# Patient Record
Sex: MALE | Race: BLACK OR AFRICAN AMERICAN | NOT HISPANIC OR LATINO | Employment: OTHER | URBAN - METROPOLITAN AREA
[De-identification: names, ages, dates, MRNs, and addresses within clinical notes are randomized per-mention and may not be internally consistent; named-entity substitution may affect disease eponyms.]

---

## 2020-08-07 ENCOUNTER — OFFICE VISIT (OUTPATIENT)
Dept: INTERNAL MEDICINE | Facility: CLINIC | Age: 70
End: 2020-08-07
Payer: MEDICARE

## 2020-08-07 ENCOUNTER — LAB VISIT (OUTPATIENT)
Dept: LAB | Facility: HOSPITAL | Age: 70
End: 2020-08-07
Attending: FAMILY MEDICINE
Payer: MEDICARE

## 2020-08-07 VITALS
BODY MASS INDEX: 24.96 KG/M2 | HEART RATE: 68 BPM | WEIGHT: 184.31 LBS | OXYGEN SATURATION: 99 % | HEIGHT: 72 IN | TEMPERATURE: 98 F | RESPIRATION RATE: 18 BRPM | DIASTOLIC BLOOD PRESSURE: 86 MMHG | SYSTOLIC BLOOD PRESSURE: 148 MMHG

## 2020-08-07 DIAGNOSIS — Z12.5 SCREENING PSA (PROSTATE SPECIFIC ANTIGEN): ICD-10-CM

## 2020-08-07 DIAGNOSIS — R03.0 ELEVATED BLOOD PRESSURE READING: ICD-10-CM

## 2020-08-07 DIAGNOSIS — Z13.6 ENCOUNTER FOR LIPID SCREENING FOR CARDIOVASCULAR DISEASE: ICD-10-CM

## 2020-08-07 DIAGNOSIS — Z11.4 ENCOUNTER FOR SCREENING FOR HIV: ICD-10-CM

## 2020-08-07 DIAGNOSIS — Z11.59 NEED FOR HEPATITIS C SCREENING TEST: ICD-10-CM

## 2020-08-07 DIAGNOSIS — Z12.12 SCREENING FOR COLORECTAL CANCER: Primary | ICD-10-CM

## 2020-08-07 DIAGNOSIS — Z76.89 ENCOUNTER TO ESTABLISH CARE: ICD-10-CM

## 2020-08-07 DIAGNOSIS — Z12.11 SCREENING FOR COLORECTAL CANCER: Primary | ICD-10-CM

## 2020-08-07 DIAGNOSIS — Z13.220 ENCOUNTER FOR LIPID SCREENING FOR CARDIOVASCULAR DISEASE: ICD-10-CM

## 2020-08-07 DIAGNOSIS — F17.290 CIGAR SMOKER MOTIVATED TO QUIT: ICD-10-CM

## 2020-08-07 LAB
ALBUMIN SERPL BCP-MCNC: 4 G/DL (ref 3.5–5.2)
ALP SERPL-CCNC: 67 U/L (ref 55–135)
ALT SERPL W/O P-5'-P-CCNC: 12 U/L (ref 10–44)
ANION GAP SERPL CALC-SCNC: 8 MMOL/L (ref 8–16)
AST SERPL-CCNC: 19 U/L (ref 10–40)
BILIRUB SERPL-MCNC: 0.6 MG/DL (ref 0.1–1)
BUN SERPL-MCNC: 14 MG/DL (ref 8–23)
CALCIUM SERPL-MCNC: 9.7 MG/DL (ref 8.7–10.5)
CHLORIDE SERPL-SCNC: 108 MMOL/L (ref 95–110)
CHOLEST SERPL-MCNC: 245 MG/DL (ref 120–199)
CHOLEST/HDLC SERPL: 5.1 {RATIO} (ref 2–5)
CO2 SERPL-SCNC: 26 MMOL/L (ref 23–29)
COMPLEXED PSA SERPL-MCNC: 5.3 NG/ML (ref 0–4)
CREAT SERPL-MCNC: 1 MG/DL (ref 0.5–1.4)
EST. GFR  (AFRICAN AMERICAN): >60 ML/MIN/1.73 M^2
EST. GFR  (NON AFRICAN AMERICAN): >60 ML/MIN/1.73 M^2
GLUCOSE SERPL-MCNC: 88 MG/DL (ref 70–110)
HDLC SERPL-MCNC: 48 MG/DL (ref 40–75)
HDLC SERPL: 19.6 % (ref 20–50)
LDLC SERPL CALC-MCNC: 179.2 MG/DL (ref 63–159)
NONHDLC SERPL-MCNC: 197 MG/DL
POTASSIUM SERPL-SCNC: 4.7 MMOL/L (ref 3.5–5.1)
PROT SERPL-MCNC: 7.9 G/DL (ref 6–8.4)
SODIUM SERPL-SCNC: 142 MMOL/L (ref 136–145)
TRIGL SERPL-MCNC: 89 MG/DL (ref 30–150)

## 2020-08-07 PROCEDURE — 86703 HIV-1/HIV-2 1 RESULT ANTBDY: CPT

## 2020-08-07 PROCEDURE — 80053 COMPREHEN METABOLIC PANEL: CPT

## 2020-08-07 PROCEDURE — 86803 HEPATITIS C AB TEST: CPT

## 2020-08-07 PROCEDURE — 99203 OFFICE O/P NEW LOW 30 MIN: CPT | Mod: PBBFAC | Performed by: FAMILY MEDICINE

## 2020-08-07 PROCEDURE — 99204 OFFICE O/P NEW MOD 45 MIN: CPT | Mod: S$PBB,,, | Performed by: FAMILY MEDICINE

## 2020-08-07 PROCEDURE — 99999 PR PBB SHADOW E&M-NEW PATIENT-LVL III: ICD-10-PCS | Mod: PBBFAC,,, | Performed by: FAMILY MEDICINE

## 2020-08-07 PROCEDURE — 85025 COMPLETE CBC W/AUTO DIFF WBC: CPT

## 2020-08-07 PROCEDURE — 80061 LIPID PANEL: CPT

## 2020-08-07 PROCEDURE — 36415 COLL VENOUS BLD VENIPUNCTURE: CPT

## 2020-08-07 PROCEDURE — 99999 PR PBB SHADOW E&M-NEW PATIENT-LVL III: CPT | Mod: PBBFAC,,, | Performed by: FAMILY MEDICINE

## 2020-08-07 PROCEDURE — 84153 ASSAY OF PSA TOTAL: CPT

## 2020-08-07 PROCEDURE — 99204 PR OFFICE/OUTPT VISIT, NEW, LEVL IV, 45-59 MIN: ICD-10-PCS | Mod: S$PBB,,, | Performed by: FAMILY MEDICINE

## 2020-08-07 RX ORDER — ASCORBIC ACID 250 MG
250 TABLET ORAL DAILY
COMMUNITY

## 2020-08-07 RX ORDER — MULTIVIT WITH MINERALS/HERBS
1 TABLET ORAL DAILY
COMMUNITY

## 2020-08-07 RX ORDER — CHOLECALCIFEROL (VITAMIN D3) 25 MCG
1000 TABLET ORAL DAILY
COMMUNITY

## 2020-08-07 NOTE — PROGRESS NOTES
Primary Doctor No  Patient Care Team:  Primary Doctor No as PCP - General    Has the patient seen any provider outside of the network since the last visit ? (no). If yes, HIPPA forms completed and records requested.      Visit Type:est care    Chief Complaint:  Chief Complaint   Patient presents with    Establish Care     pt states he has been having problems with his BP       History of Present Illness:  HPI     Establish Care      Additional comments: pt states he has been having problems with his BP          Last edited by Mark Madera MA on 2020  2:45 PM. (History)      PMH none  PSH none  FMH paternal DM, heart failure,  73yo  Sister DM,  from complications of DM, at 61yo    SH 1 cigar per day, since 15 yo  Denies cigarettes or vaping    One episode of dizziness  Went to firestation to be checked and elevated 160/102  Reports dietary indiscretion    HA off an on, takes aleve   Located behind ear  Denies worst HA of life  Able to function    Taking a beta prostate  No longer experiencing weakened stream  Desires psa screening    Neg depression screening using PHQ2    How often do you have a drink containing Alcohol? occasional, social use one cocktail per night    Do you exercise  (no ) not active    Do you take a baby Aspirin daily ( no)    Do you have an advance directive ( no ) The patient was given information regarding Living Will/Durable Power-of- if requested.     The following were reviewed: Active problem list, medication list, allergies, family history, social history, and Health Maintenance.     Medications have been reviewed and reconciled with patient at visit today.    Exam:  Vitals:    20 1445   BP: (!) 148/86   Pulse: 68   Resp: 18   Temp: 97.9 °F (36.6 °C)     Weight: 83.6 kg (184 lb 4.9 oz)   Body mass index is 25 kg/m².    Review of Systems   Constitutional: Negative for fever and malaise/fatigue.   HENT: Negative for congestion and sore throat.    Eyes:  Negative for blurred vision and discharge.   Respiratory: Negative for cough and wheezing.    Cardiovascular: Negative for palpitations and leg swelling.   Gastrointestinal: Negative for nausea and vomiting.   Genitourinary: Negative for dysuria and hematuria.   Musculoskeletal: Negative for falls and joint pain.   Neurological: Positive for headaches. Negative for dizziness.   Psychiatric/Behavioral: Negative for depression. The patient is not nervous/anxious and does not have insomnia.        Physical Exam  Vitals signs reviewed.   Constitutional:       General: He is not in acute distress.     Appearance: He is well-developed.   HENT:      Head: Normocephalic and atraumatic.   Neck:      Musculoskeletal: Normal range of motion.   Cardiovascular:      Rate and Rhythm: Normal rate.   Pulmonary:      Effort: Pulmonary effort is normal. No respiratory distress.   Abdominal:      Palpations: Abdomen is soft.   Musculoskeletal: Normal range of motion.   Skin:     General: Skin is warm and dry.   Neurological:      Mental Status: He is alert and oriented to person, place, and time.   Psychiatric:         Behavior: Behavior normal.         Laboratory Reviewed: (N/A)  No results found for: WBC, HGB, HCT, PLT, CHOL, TRIG, HDL, LDLDIRECT, ALT, AST, NA, K, CL, CREATININE, BUN, CO2, TSH, PSA, INR, GLUF, HGBA1C, MICROALBUR    Assessment:  1. Screening for colorectal cancer    2. Encounter for lipid screening for cardiovascular disease    3. Encounter for screening for HIV    4. Need for hepatitis C screening test    5. Encounter to establish care    6. Elevated blood pressure reading    7. Cigar smoker motivated to quit          Plan:  Problem List Items Addressed This Visit        Cardiac/Vascular    Elevated blood pressure reading    Current Assessment & Plan     Counseled on lifestyle changes. F/u in 2 weeks. If elevated, initiate diuretic            Other    Cigar smoker motivated to quit      Other Visit Diagnoses      Screening for colorectal cancer    -  Primary    Encounter for lipid screening for cardiovascular disease        Encounter for screening for HIV        Need for hepatitis C screening test        Encounter to establish care                Follow up: No follow-ups on file.    After visit summary printed and given to patient upon discharge.

## 2020-08-07 NOTE — PATIENT INSTRUCTIONS
Step-by-Step  Checking Your Blood Pressure    Date Last Reviewed: 4/27/2016  © 7235-8312 [employer]. [address] [place] [address], [place] [address]. All rights reserved. This information is not intended as a substitute for professional medical care. Always follow your healthcare professional's instructions.        Taking a Diuretic  Your healthcare provider has prescribed a diuretic, or water pill, to help your body get rid of excess water and salt and maintain appropriate fluid balance. Taking your diuretic can help you feel better, breathe better, move more easily, and have more energy.     Take your medication early in the day at the same time each day.      The name of my diuretic is:     ________________________________________   Medicine tips  · Read the fact sheet that comes with your medicine. It tells you when and how to take it. Ask for a medicine sheet if you dont get one.  · If you take 2 or more doses each day, take the last one before dinner if you can. That way youll get up fewer times during the night to go to the bathroom. However, make sure you have enough time between doses during the day.   · If you miss a dose, take it as soon as you remember. If it is almost time for the next dose, skip the missed dose. Do not take a double dose.  · If you miss 2 or more consecutive doses, call your healthcare provider. You may be at risk for fluid buildup.  For your safety  · Follow your doctors guidelines for potassium intake. You may need to take a potassium supplement. Or, you may need to avoid potassium supplements, salt substitutes with potassium, or large amounts of high-potassium foods (such as bananas, potatoes, broccoli, and milk). Recommendations for potassium will depend on the type of diuretic you are prescribed along with your kidney function and other factors. Your healthcare provider will likely want to check your potassium level regularly while you are taking this  medicine.  · Talk to your healthcare provider about whether it is safe for you to drink alcohol while taking this medicine.  · Get up slowly when you are sitting or lying down. This helps prevent dizziness and falls due to dizziness.  · Ask your healthcare provider or pharmacist before you take any other prescription or nonprescription medicine or herbal supplements. Some of them may interact with your diuretic and keep it from working correctly.  · Limit exposure to sunlight. A diuretic may increase your sensitivity to the sun. Even brief sun exposure may cause skin rash, itching, redness, or other discoloration. It may also lead to severe sunburn. To protect your skin do the following:  ¨ Avoid direct sunlight, especially between 10 a.m. and 2 p.m., whenever possible.  ¨ Apply a daily sunblock of at least SPF 15 (or higher) to any exposed skin, including your lips.  ¨ Wear protective clothing, such as a hat and sunglasses, when you are outdoors.  ¨ Avoid sunlamps and tanning booths.  ¨ Long-sleeve shirts and pants in the summer can help protect your skin.        When to seek medical advice  Call your healthcare provider right away if any of these occur:  · Have diarrhea, constipation, nausea or vomiting.  · Lose your appetite or notice a rapid or excessive weight gain.  · Feel extremely tired or weak.  · Have shortness of breath or difficulty breathing or swallowing.  · Have numbness or tingling in your hands, feet, or lips or a ringing in your ears.  · Feel lightheaded when getting up after sitting or lying down.  · Have headaches, blurred vision, or feel a sense of confusion.  · Have muscle cramps or joint pain.  · Have chest pains or changes in your heartbeat.  · Have an excessive thirst or a dry mouth.  · Notice a skin rash.  · Gain more than 2 pounds in 1 day or 4 pounds in 1 week (ask your healthcare provider for his or her specific direction).  · Have any other unusual symptoms.   Date Last Reviewed:  6/1/2016  © 2421-7323 [employer]. [address] [place] [address], [place] [address]. All rights reserved. This information is not intended as a substitute for professional medical care. Always follow your healthcare professional's instructions.

## 2020-08-08 LAB
BASOPHILS # BLD AUTO: 0.04 K/UL (ref 0–0.2)
BASOPHILS NFR BLD: 0.8 % (ref 0–1.9)
DIFFERENTIAL METHOD: ABNORMAL
EOSINOPHIL # BLD AUTO: 0.1 K/UL (ref 0–0.5)
EOSINOPHIL NFR BLD: 2.5 % (ref 0–8)
ERYTHROCYTE [DISTWIDTH] IN BLOOD BY AUTOMATED COUNT: 14.8 % (ref 11.5–14.5)
HCT VFR BLD AUTO: 47 % (ref 40–54)
HGB BLD-MCNC: 14.7 G/DL (ref 14–18)
IMM GRANULOCYTES # BLD AUTO: 0.01 K/UL (ref 0–0.04)
IMM GRANULOCYTES NFR BLD AUTO: 0.2 % (ref 0–0.5)
LYMPHOCYTES # BLD AUTO: 2.3 K/UL (ref 1–4.8)
LYMPHOCYTES NFR BLD: 44.9 % (ref 18–48)
MCH RBC QN AUTO: 28.5 PG (ref 27–31)
MCHC RBC AUTO-ENTMCNC: 31.3 G/DL (ref 32–36)
MCV RBC AUTO: 91 FL (ref 82–98)
MONOCYTES # BLD AUTO: 0.4 K/UL (ref 0.3–1)
MONOCYTES NFR BLD: 8.6 % (ref 4–15)
NEUTROPHILS # BLD AUTO: 2.2 K/UL (ref 1.8–7.7)
NEUTROPHILS NFR BLD: 43 % (ref 38–73)
NRBC BLD-RTO: 0 /100 WBC
PLATELET # BLD AUTO: 234 K/UL (ref 150–350)
PMV BLD AUTO: 11.3 FL (ref 9.2–12.9)
RBC # BLD AUTO: 5.15 M/UL (ref 4.6–6.2)
WBC # BLD AUTO: 5.12 K/UL (ref 3.9–12.7)

## 2020-08-10 ENCOUNTER — TELEPHONE (OUTPATIENT)
Dept: ENDOSCOPY | Facility: HOSPITAL | Age: 70
End: 2020-08-10

## 2020-08-10 ENCOUNTER — TELEPHONE (OUTPATIENT)
Dept: INTERNAL MEDICINE | Facility: CLINIC | Age: 70
End: 2020-08-10

## 2020-08-10 DIAGNOSIS — Z12.11 COLON CANCER SCREENING: Primary | ICD-10-CM

## 2020-08-10 DIAGNOSIS — Z12.11 SCREEN FOR COLON CANCER: ICD-10-CM

## 2020-08-10 DIAGNOSIS — R97.20 ELEVATED PSA, LESS THAN 10 NG/ML: ICD-10-CM

## 2020-08-10 DIAGNOSIS — E78.2 HYPERLIPIDEMIA, MIXED: ICD-10-CM

## 2020-08-10 LAB
HCV AB SERPL QL IA: NEGATIVE
HIV 1+2 AB+HIV1 P24 AG SERPL QL IA: NEGATIVE

## 2020-08-10 NOTE — TELEPHONE ENCOUNTER
Location Screening:    If answers yes to any of the following, schedule at [facility] ONLY. If No, OK for either location.    1. Is there a diagnosis of heart failure, severe heart valve disease (aortic stenosis) or mechanical valve? no  a. Is the Left Ventricle Ejection Fraction <30% ? no    2. Does the pt have pulmonary hypertension? no   a. Is pulmonary arterial pressure gradient >50mmHg? no   b. Is there evidence of right ventricular dysfunction? no    3. Does the pt have achalasia? no    4. Any history of negative reaction to anesthesia? no   a. Myasthenia gravis? no   b. Malignant hyperthermia? no   c. Other? no    5. Is procedure for esophageal banding? no      COVID Screening    1. Have you had a fever in the last 7 days or have you used fever reducing medicines for a fever in the last 7 days?  no    2. Are you experiencing shortness of breath, cough, muscle aches, loss of taste or loss of smell?  no    If answered yes to questions 1 and 2, the patient must seek medical attention with their PCP.  Do not schedule their procedure.     3. Are you residing with anyone who has tested positive for Covid?  no    If answered yes to question 3, recommend 14 day self-quarantine from the date of relative's positive test and place special needs note in the depot.  Wait to schedule.     ENDO screening    1. Have you been admitted for cardiac, kidney or pulmonary causes to the hospital in the past 3 months? no   If yes, schedule an appointment with PCP before scheduling endoscopic procedure.     2. Have you had a stent placed in the last 12 months? no   If yes, for a screening visit, cancel and message the ordering provider.  The patient will need a new order when the time is appropriate.     3. Have you had a stroke or heart attack in the past 6 months? no   If yes, cancel and refer patient to ordering provider for clearance, also message ordering provider to inform.     4. Have you had any chest pain in the past 3 months?  "no   If yes, Have you been evaluated by your PCP and/or cardiologist and it was determined to not be heart related? not applicable   If No, Pt needs to be seen by PCP or Cardiologist .  Pt can be scheduled once clearance obtained by either of those providers.     5. Do you take prescription weight loss medications?  no   If yes, must stop for 2 weeks prior to procedure.     6. Have you been diagnosed with diverticulitis within the past 3 months? no   If yes, must have been seen by GI within the last 3 months, if not schedule with GI [facility].    If pt has been seen by GI, schedule procedure 8-12 weeks post antibiotic treatment.     7. Are you on Dialysis? no  If yes, schedule procedure for the day AFTER dialysis.  Appt time should be 9am or later, patient arrival time is 2 hours prior.  Nulytely or miralax prep for all patients with kidney disease.     8. Are you diabetic?  no   If yes, schedule morning appt. Advise pt to hold all diabetic meds day of procedure.     9. If pt is older than 80 years of age and HAS NOT been seen by GI or PCP within the last 6 months, needs appt with GI [facility].   If pt has been seen by the GI provider or PCP within the past 6  months AND meets criteria, schedule procedure AND send message to the endoscopist.     10. Is patient on a "high risk" medication (blood thinner/antiplatelet agent)?  no   If yes, has cardiac clearance been obtained within the last 60 days? No   If no, a new clearance needs to be obtained.     Final Questions:    1.I have reviewed the last colonoscopy for recommendations regarding next procedure bowel prep.  yes  2. I have reviewed medications and allergies.  yes  3. I have verified the pharmacy information and appropriate prep sent if needed. yes  4. Prep instructions have been mailed or sent to portal per patient request. yes        All schedulers will have ability to reach out to the ordering GI provider to clarify any issues.       "

## 2020-08-10 NOTE — ASSESSMENT & PLAN NOTE
The 10-year ASCVD risk score (Heaven BRAND Jr., et al., 2013) is: 23.9%    Values used to calculate the score:      Age: 69 years      Sex: Male      Is Non- : Yes      Diabetic: No      Tobacco smoker: Yes      Systolic Blood Pressure: 148 mmHg      Is BP treated: No      HDL Cholesterol: 48 mg/dL      Total Cholesterol: 245 mg/dL   rec initiating statin. F/u to discuss

## 2020-08-10 NOTE — TELEPHONE ENCOUNTER
----- Message from Tessa Noriega MD sent at 8/10/2020  8:40 AM CDT -----  abnormal labs, elevated psa and cholesterol. rec f/u visit to discuss initiation of meds. Order placed for urine

## 2020-08-10 NOTE — TELEPHONE ENCOUNTER
advised pt of abnormal labs, elevated psa and cholesterol. scheduled f/u visit to discuss initiation of meds.. pt gave a verbal understanding of lab results, appt date and time.

## 2020-08-10 NOTE — PROGRESS NOTES
abnormal labs, elevated psa and cholesterol. rec f/u visit to discuss initiation of meds. Order placed for urine
- - -

## 2020-08-12 ENCOUNTER — OFFICE VISIT (OUTPATIENT)
Dept: INTERNAL MEDICINE | Facility: CLINIC | Age: 70
End: 2020-08-12
Payer: MEDICARE

## 2020-08-12 VITALS
HEART RATE: 75 BPM | BODY MASS INDEX: 24.86 KG/M2 | TEMPERATURE: 99 F | OXYGEN SATURATION: 99 % | SYSTOLIC BLOOD PRESSURE: 144 MMHG | RESPIRATION RATE: 18 BRPM | DIASTOLIC BLOOD PRESSURE: 78 MMHG | HEIGHT: 72 IN | WEIGHT: 183.56 LBS

## 2020-08-12 DIAGNOSIS — I10 HYPERTENSION, ESSENTIAL: ICD-10-CM

## 2020-08-12 DIAGNOSIS — R97.20 ELEVATED PSA, LESS THAN 10 NG/ML: Primary | ICD-10-CM

## 2020-08-12 DIAGNOSIS — R29.898 LEFT HAND WEAKNESS: ICD-10-CM

## 2020-08-12 DIAGNOSIS — E78.2 HYPERLIPIDEMIA, MIXED: ICD-10-CM

## 2020-08-12 PROBLEM — R03.0 ELEVATED BLOOD PRESSURE READING: Status: RESOLVED | Noted: 2020-08-07 | Resolved: 2020-08-12

## 2020-08-12 PROCEDURE — 99213 OFFICE O/P EST LOW 20 MIN: CPT | Mod: PBBFAC | Performed by: FAMILY MEDICINE

## 2020-08-12 PROCEDURE — 99214 PR OFFICE/OUTPT VISIT, EST, LEVL IV, 30-39 MIN: ICD-10-PCS | Mod: S$PBB,,, | Performed by: FAMILY MEDICINE

## 2020-08-12 PROCEDURE — 99999 PR PBB SHADOW E&M-EST. PATIENT-LVL III: CPT | Mod: PBBFAC,,, | Performed by: FAMILY MEDICINE

## 2020-08-12 PROCEDURE — 99214 OFFICE O/P EST MOD 30 MIN: CPT | Mod: S$PBB,,, | Performed by: FAMILY MEDICINE

## 2020-08-12 PROCEDURE — 99999 PR PBB SHADOW E&M-EST. PATIENT-LVL III: ICD-10-PCS | Mod: PBBFAC,,, | Performed by: FAMILY MEDICINE

## 2020-08-12 RX ORDER — HYDROCHLOROTHIAZIDE 12.5 MG/1
12.5 CAPSULE ORAL DAILY
Qty: 30 CAPSULE | Refills: 2 | Status: SHIPPED | OUTPATIENT
Start: 2020-08-12 | End: 2020-11-16

## 2020-08-12 RX ORDER — ATORVASTATIN CALCIUM 40 MG/1
40 TABLET, FILM COATED ORAL DAILY
Qty: 30 TABLET | Refills: 2 | Status: SHIPPED | OUTPATIENT
Start: 2020-08-12 | End: 2020-11-16

## 2020-08-12 NOTE — PROGRESS NOTES
Subjective:       Patient ID: Jose Young is a 69 y.o. male.    Chief Complaint: Follow-up (elevated psa and cholestorol, pt also states its hard sometimes for him to  with his hand)    HPI  Denies unintentional weight loss  Has been taking beta prostate for urinary difficulties  Taking for couple of weeks  Now resolving  Denies [facility] prostate ca    Reports family members having myaglias with statins    Right hand dom  Left hand weakness  Onset one year  Denies numbness  Denies trauma    Review of Systems   Constitutional: Negative for unexpected weight change.   Genitourinary: Negative for difficulty urinating.   Neurological: Positive for weakness. Negative for facial asymmetry, speech difficulty and headaches.        Objective:   BP (!) 144/78 (BP Location: Right arm, Patient Position: Sitting, BP Method: Large (Manual))   Pulse 75   Temp 99.1 °F (37.3 °C) (Tympanic)   Resp 18   Ht 6' (1.829 m)   Wt 83.3 kg (183 lb 8.5 oz)   SpO2 99%   BMI 24.89 kg/m²     BP Readings from Last 3 Encounters:   08/12/20 (!) 144/78   08/07/20 (!) 148/86       No results found for: LABA1C, HGBA1C    Physical Exam  Vitals signs reviewed.   Constitutional:       General: He is not in acute distress.     Appearance: He is well-developed.   HENT:      Head: Normocephalic and atraumatic.   Neck:      Musculoskeletal: Normal range of motion.   Cardiovascular:      Rate and Rhythm: Normal rate.   Pulmonary:      Effort: Pulmonary effort is normal. No respiratory distress.   Abdominal:      Palpations: Abdomen is soft.   Musculoskeletal: Normal range of motion.         General: No deformity.      Comments: Neg tinel and compression and phalen test  5/5  strength b/l   Skin:     General: Skin is warm and dry.   Neurological:      Mental Status: He is alert and oriented to person, place, and time.   Psychiatric:         Behavior: Behavior normal.       Assessment:     1. Elevated PSA, less than 10 ng/ml    2. Hyperlipidemia,  mixed    3. Hypertension, essential    4. Left hand weakness      Plan:     Problem List Items Addressed This Visit        Cardiac/Vascular    Hyperlipidemia, mixed    Current Assessment & Plan     Atorvastatin           Relevant Medications    atorvastatin (LIPITOR) 40 MG tablet    Hypertension, essential    Current Assessment & Plan     Elevated on second visit. Will initiate diuretic         Relevant Medications    hydroCHLOROthiazide (MICROZIDE) 12.5 mg capsule       Renal/    Elevated PSA, less than 10 ng/ml - Primary    Current Assessment & Plan     Monitor psa after taking beta prostate  Deferring uro referral at this time  Discussed initiation of finasteride and flomax and possible bx with uro consult           Other Visit Diagnoses     Left hand weakness        Relevant Orders    Ambulatory referral/consult to Physical Medicine Rehab          Follow up in about 4 weeks (around 9/9/2020).

## 2020-08-12 NOTE — PATIENT INSTRUCTIONS
Hydrochlorothiazide, HCTZ capsules or tablets  What is this medicine?  HYDROCHLOROTHIAZIDE (thea droe klor oh THYE a zide) is a diuretic. It increases the amount of urine passed, which causes the body to lose salt and water. This medicine is used to treat high blood pressure. It is also reduces the swelling and water retention caused by various medical conditions, such as heart, liver, or kidney disease.  How should I use this medicine?  Take this medicine by mouth with a glass of water. Follow the directions on the prescription label. Take your medicine at regular intervals. Remember that you will need to pass urine frequently after taking this medicine. Do not take your doses at a time of day that will cause you problems. Do not stop taking your medicine unless your doctor tells you to.  Talk to your pediatrician regarding the use of this medicine in children. Special care may be needed.  What side effects may I notice from receiving this medicine?  Side effects that you should report to your doctor or health care professional as soon as possible:  · allergic reactions such as skin rash or itching, hives, swelling of the lips, mouth, tongue, or throat  · changes in vision  · chest pain  · eye pain  · fast or irregular heartbeat  · feeling faint or lightheaded, falls  · gout attack  · muscle pain or cramps  · pain or difficulty when passing urine  · pain, tingling, numbness in the hands or feet  · redness, blistering, peeling or loosening of the skin, including inside the mouth  · unusually weak or tired  Side effects that usually do not require medical attention (report to your doctor or health care professional if they continue or are bothersome):  · change in sex drive or performance  · dry mouth  · headache  · stomach upset  What may interact with this medicine?  · cholestyramine  · colestipol  · digoxin  · dofetilide  · lithium  · medicines for blood pressure  · medicines for diabetes  · medicines that relax  muscles for surgery  · other diuretics  · steroid medicines like prednisone or cortisone  What if I miss a dose?  If you miss a dose, take it as soon as you can. If it is almost time for your next dose, take only that dose. Do not take double or extra doses.  Where should I keep my medicine?  Keep out of the reach of children.  Store at room temperature between 15 and 30 degrees C (59 and 86 degrees F). Do not freeze. Protect from light and moisture. Keep container closed tightly. Throw away any unused medicine after the expiration date.  What should I tell my health care provider before I take this medicine?  They need to know if you have any of these conditions:  · diabetes  · gout  · immune system problems, like lupus  · kidney disease or kidney stones  · liver disease  · pancreatitis  · small amount of urine or difficulty passing urine  · an unusual or allergic reaction to hydrochlorothiazide, sulfa drugs, other medicines, foods, dyes, or preservatives  · pregnant or trying to get pregnant  · breast-feeding  What should I watch for while using this medicine?  Visit your doctor or health care professional for regular checks on your progress. Check your blood pressure as directed. Ask your doctor or health care professional what your blood pressure should be and when you should contact him or her.  You may need to be on a special diet while taking this medicine. Ask your doctor.  Check with your doctor or health care professional if you get an attack of severe diarrhea, nausea and vomiting, or if you sweat a lot. The loss of too much body fluid can make it dangerous for you to take this medicine.  You may get drowsy or dizzy. Do not drive, use machinery, or do anything that needs mental alertness until you know how this medicine affects you. Do not stand or sit up quickly, especially if you are an older patient. This reduces the risk of dizzy or fainting spells. Alcohol may interfere with the effect of this  medicine. Avoid alcoholic drinks.  This medicine may affect your blood sugar level. If you have diabetes, check with your doctor or health care professional before changing the dose of your diabetic medicine.  This medicine can make you more sensitive to the sun. Keep out of the sun. If you cannot avoid being in the sun, wear protective clothing and use sunscreen. Do not use sun lamps or tanning beds/booths.  NOTE:This sheet is a summary. It may not cover all possible information. If you have questions about this medicine, talk to your doctor, pharmacist, or health care provider. Copyright© 2017 [employer]        Atorvastatin tablets  What is this medicine?  ATORVASTATIN (a TORE va sta tin) is known as a HMG-CoA reductase inhibitor or 'statin'. It lowers the level of cholesterol and triglycerides in the blood. This drug may also reduce the risk of heart attack, stroke, or other health problems in patients with risk factors for heart disease. Diet and lifestyle changes are often used with this drug.  How should I use this medicine?  Take this medicine by mouth with a glass of water. Follow the directions on the prescription label. You can take this medicine with or without food. Take your doses at regular intervals. Do not take your medicine more often than directed.  Talk to your pediatrician regarding the use of this medicine in children. While this drug may be prescribed for children as young as 10 years old for selected conditions, precautions do apply.  What side effects may I notice from receiving this medicine?  Side effects that you should report to your doctor or health care professional as soon as possible:  · allergic reactions like skin rash, itching or hives, swelling of the face, lips, or tongue  · dark urine  · fever  · joint pain  · muscle cramps, pain  · redness, blistering, peeling or loosening of the skin, including inside the mouth  · trouble passing urine or change in the amount of  urine  · unusually weak or tired  · yellowing of eyes or skin  Side effects that usually do not require medical attention (report to your doctor or health care professional if they continue or are bothersome):  · constipation  · heartburn  · stomach gas, pain, upset  What may interact with this medicine?  Do not take this medicine with any of the following medications:  · red yeast rice  · telaprevir  · telithromycin  · voriconazole  This medicine may also interact with the following medications:  · alcohol  · antiviral medicines for HIV or AIDS  · boceprevir  · certain antibiotics like clarithromycin, erythromycin, troleandomycin  · certain medicines for cholesterol like fenofibrate or gemfibrozil  · cimetidine  · clarithromycin  · colchicine  · cyclosporine  · digoxin  · female hormones, like estrogens or progestins and birth control pills  · grapefruit juice  · medicines for fungal infections like fluconazole, itraconazole, ketoconazole  · niacin  · rifampin  · spironolactone  What if I miss a dose?  If you miss a dose, take it as soon as you can. If it is almost time for your next dose, take only that dose. Do not take double or extra doses.  Where should I keep my medicine?  Keep out of the reach of children.  Store at room temperature between 20 to 25 degrees C (68 to 77 degrees F). Throw away any unused medicine after the expiration date.  What should I tell my health care provider before I take this medicine?  They need to know if you have any of these conditions:  · frequently drink alcoholic beverages  · history of stroke, TIA  · kidney disease  · liver disease  · muscle aches or weakness  · other medical condition  · an unusual or allergic reaction to atorvastatin, other medicines, foods, dyes, or preservatives  · pregnant or trying to get pregnant  · breast-feeding  What should I watch for while using this medicine?  Visit your doctor or health care professional for regular check-ups. You may need  regular tests to make sure your liver is working properly.  Tell your doctor or health care professional right away if you get any unexplained muscle pain, tenderness, or weakness, especially if you also have a fever and tiredness. Your doctor or health care professional may tell you to stop taking this medicine if you develop muscle problems. If your muscle problems do not go away after stopping this medicine, contact your health care professional.  This drug is only part of a total heart-health program. Your doctor or a dietician can suggest a low-cholesterol and low-fat diet to help. Avoid alcohol and smoking, and keep a proper exercise schedule.  Do not use this drug if you are pregnant or breast-feeding. Serious side effects to an unborn child or to an infant are possible. Talk to your doctor or pharmacist for more information.  This medicine may affect blood sugar levels. If you have diabetes, check with your doctor or health care professional before you change your diet or the dose of your diabetic medicine.  If you are going to have surgery tell your health care professional that you are taking this drug.  NOTE:This sheet is a summary. It may not cover all possible information. If you have questions about this medicine, talk to your doctor, pharmacist, or health care provider. Copyright© 2017 [employer]

## 2020-08-12 NOTE — ASSESSMENT & PLAN NOTE
Monitor psa after taking beta prostate  Deferring uro referral at this time  Discussed initiation of finasteride and flomax and possible bx with uro consult

## 2020-09-21 ENCOUNTER — TELEPHONE (OUTPATIENT)
Dept: ENDOSCOPY | Facility: HOSPITAL | Age: 70
End: 2020-09-21

## 2020-09-21 ENCOUNTER — TELEPHONE (OUTPATIENT)
Dept: INTERNAL MEDICINE | Facility: CLINIC | Age: 70
End: 2020-09-21

## 2020-09-21 DIAGNOSIS — Z13.9 SCREENING PROCEDURE: Primary | ICD-10-CM

## 2020-11-16 DIAGNOSIS — E78.2 HYPERLIPIDEMIA, MIXED: ICD-10-CM

## 2020-11-16 DIAGNOSIS — I10 HYPERTENSION, ESSENTIAL: ICD-10-CM

## 2020-11-16 RX ORDER — ATORVASTATIN CALCIUM 40 MG/1
TABLET, FILM COATED ORAL
Qty: 30 TABLET | Refills: 0 | Status: SHIPPED | OUTPATIENT
Start: 2020-11-16 | End: 2022-12-16 | Stop reason: SDUPTHER

## 2020-11-16 RX ORDER — HYDROCHLOROTHIAZIDE 12.5 MG/1
CAPSULE ORAL
Qty: 30 CAPSULE | Refills: 0 | Status: SHIPPED | OUTPATIENT
Start: 2020-11-16 | End: 2023-02-06 | Stop reason: SDUPTHER

## 2020-12-09 ENCOUNTER — PATIENT OUTREACH (OUTPATIENT)
Dept: ADMINISTRATIVE | Facility: HOSPITAL | Age: 70
End: 2020-12-09

## 2020-12-09 NOTE — PROGRESS NOTES
Colonoscopy Report. Attempting to contact pt to discuss colonoscopy. Patient was scheduled for a colonoscopy on 09/24/20, but it was not done.  Unable to reach patient at this time. Left voicemail.

## 2021-01-06 ENCOUNTER — TELEPHONE (OUTPATIENT)
Dept: ADMINISTRATIVE | Facility: HOSPITAL | Age: 71
End: 2021-01-06

## 2021-04-19 ENCOUNTER — OFFICE VISIT (OUTPATIENT)
Dept: INTERNAL MEDICINE | Facility: CLINIC | Age: 71
End: 2021-04-19
Payer: COMMERCIAL

## 2021-04-19 ENCOUNTER — LAB VISIT (OUTPATIENT)
Dept: LAB | Facility: HOSPITAL | Age: 71
End: 2021-04-19
Attending: INTERNAL MEDICINE
Payer: COMMERCIAL

## 2021-04-19 VITALS
BODY MASS INDEX: 19.83 KG/M2 | WEIGHT: 146.38 LBS | HEIGHT: 72 IN | HEART RATE: 108 BPM | DIASTOLIC BLOOD PRESSURE: 66 MMHG | OXYGEN SATURATION: 98 % | TEMPERATURE: 99 F | SYSTOLIC BLOOD PRESSURE: 114 MMHG

## 2021-04-19 DIAGNOSIS — R97.20 ELEVATED PSA: ICD-10-CM

## 2021-04-19 DIAGNOSIS — R63.4 WEIGHT LOSS: ICD-10-CM

## 2021-04-19 DIAGNOSIS — Z12.11 COLON CANCER SCREENING: ICD-10-CM

## 2021-04-19 DIAGNOSIS — R13.19 ESOPHAGEAL DYSPHAGIA: Primary | ICD-10-CM

## 2021-04-19 DIAGNOSIS — Z12.5 PROSTATE CANCER SCREENING: ICD-10-CM

## 2021-04-19 LAB
ALBUMIN SERPL BCP-MCNC: 3.9 G/DL (ref 3.5–5.2)
ALP SERPL-CCNC: 66 U/L (ref 55–135)
ALT SERPL W/O P-5'-P-CCNC: 11 U/L (ref 10–44)
ANION GAP SERPL CALC-SCNC: 9 MMOL/L (ref 8–16)
AST SERPL-CCNC: 21 U/L (ref 10–40)
BASOPHILS # BLD AUTO: 0.03 K/UL (ref 0–0.2)
BASOPHILS NFR BLD: 0.4 % (ref 0–1.9)
BILIRUB SERPL-MCNC: 0.5 MG/DL (ref 0.1–1)
BUN SERPL-MCNC: 10 MG/DL (ref 8–23)
CALCIUM SERPL-MCNC: 9.6 MG/DL (ref 8.7–10.5)
CHLORIDE SERPL-SCNC: 104 MMOL/L (ref 95–110)
CO2 SERPL-SCNC: 25 MMOL/L (ref 23–29)
COMPLEXED PSA SERPL-MCNC: 6.6 NG/ML (ref 0–4)
CREAT SERPL-MCNC: 1 MG/DL (ref 0.5–1.4)
DIFFERENTIAL METHOD: ABNORMAL
EOSINOPHIL # BLD AUTO: 0.2 K/UL (ref 0–0.5)
EOSINOPHIL NFR BLD: 2.7 % (ref 0–8)
ERYTHROCYTE [DISTWIDTH] IN BLOOD BY AUTOMATED COUNT: 15.5 % (ref 11.5–14.5)
EST. GFR  (AFRICAN AMERICAN): >60 ML/MIN/1.73 M^2
EST. GFR  (NON AFRICAN AMERICAN): >60 ML/MIN/1.73 M^2
GLUCOSE SERPL-MCNC: 89 MG/DL (ref 70–110)
HCT VFR BLD AUTO: 43.9 % (ref 40–54)
HGB BLD-MCNC: 14.3 G/DL (ref 14–18)
IMM GRANULOCYTES # BLD AUTO: 0.02 K/UL (ref 0–0.04)
IMM GRANULOCYTES NFR BLD AUTO: 0.3 % (ref 0–0.5)
LYMPHOCYTES # BLD AUTO: 2.9 K/UL (ref 1–4.8)
LYMPHOCYTES NFR BLD: 40.6 % (ref 18–48)
MCH RBC QN AUTO: 28.5 PG (ref 27–31)
MCHC RBC AUTO-ENTMCNC: 32.6 G/DL (ref 32–36)
MCV RBC AUTO: 88 FL (ref 82–98)
MONOCYTES # BLD AUTO: 0.6 K/UL (ref 0.3–1)
MONOCYTES NFR BLD: 8.2 % (ref 4–15)
NEUTROPHILS # BLD AUTO: 3.4 K/UL (ref 1.8–7.7)
NEUTROPHILS NFR BLD: 47.8 % (ref 38–73)
NRBC BLD-RTO: 0 /100 WBC
PLATELET # BLD AUTO: 298 K/UL (ref 150–450)
PMV BLD AUTO: 10.7 FL (ref 9.2–12.9)
POTASSIUM SERPL-SCNC: 4.1 MMOL/L (ref 3.5–5.1)
PROT SERPL-MCNC: 8.1 G/DL (ref 6–8.4)
RBC # BLD AUTO: 5.01 M/UL (ref 4.6–6.2)
SODIUM SERPL-SCNC: 138 MMOL/L (ref 136–145)
TSH SERPL DL<=0.005 MIU/L-ACNC: 1.65 UIU/ML (ref 0.4–4)
WBC # BLD AUTO: 7.05 K/UL (ref 3.9–12.7)

## 2021-04-19 PROCEDURE — 99214 PR OFFICE/OUTPT VISIT, EST, LEVL IV, 30-39 MIN: ICD-10-PCS | Mod: S$GLB,,, | Performed by: INTERNAL MEDICINE

## 2021-04-19 PROCEDURE — 85025 COMPLETE CBC W/AUTO DIFF WBC: CPT | Performed by: INTERNAL MEDICINE

## 2021-04-19 PROCEDURE — 99214 OFFICE O/P EST MOD 30 MIN: CPT | Mod: S$GLB,,, | Performed by: INTERNAL MEDICINE

## 2021-04-19 PROCEDURE — 99999 PR PBB SHADOW E&M-EST. PATIENT-LVL III: ICD-10-PCS | Mod: PBBFAC,,, | Performed by: INTERNAL MEDICINE

## 2021-04-19 PROCEDURE — 36415 COLL VENOUS BLD VENIPUNCTURE: CPT | Performed by: INTERNAL MEDICINE

## 2021-04-19 PROCEDURE — 99999 PR PBB SHADOW E&M-EST. PATIENT-LVL III: CPT | Mod: PBBFAC,,, | Performed by: INTERNAL MEDICINE

## 2021-04-19 PROCEDURE — 84153 ASSAY OF PSA TOTAL: CPT | Performed by: INTERNAL MEDICINE

## 2021-04-19 PROCEDURE — 80053 COMPREHEN METABOLIC PANEL: CPT | Performed by: INTERNAL MEDICINE

## 2021-04-19 PROCEDURE — 84443 ASSAY THYROID STIM HORMONE: CPT | Performed by: INTERNAL MEDICINE

## 2021-04-19 PROCEDURE — 99213 OFFICE O/P EST LOW 20 MIN: CPT | Mod: PBBFAC | Performed by: INTERNAL MEDICINE

## 2021-04-19 RX ORDER — OFLOXACIN 3 MG/ML
SOLUTION/ DROPS OPHTHALMIC
COMMUNITY
Start: 2021-02-04

## 2021-04-19 RX ORDER — ERYTHROMYCIN 5 MG/G
OINTMENT OPHTHALMIC
COMMUNITY
Start: 2021-02-04

## 2021-04-19 RX ORDER — CYPROHEPTADINE HYDROCHLORIDE 4 MG/1
4 TABLET ORAL 3 TIMES DAILY
COMMUNITY
Start: 2021-04-18

## 2021-04-20 ENCOUNTER — TELEPHONE (OUTPATIENT)
Dept: ENDOSCOPY | Facility: HOSPITAL | Age: 71
End: 2021-04-20

## 2021-04-20 RX ORDER — SODIUM, POTASSIUM,MAG SULFATES 17.5-3.13G
1 SOLUTION, RECONSTITUTED, ORAL ORAL DAILY
Qty: 1 KIT | Refills: 0 | Status: SHIPPED | OUTPATIENT
Start: 2021-04-20 | End: 2021-04-22

## 2021-04-22 ENCOUNTER — TELEPHONE (OUTPATIENT)
Dept: INTERNAL MEDICINE | Facility: CLINIC | Age: 71
End: 2021-04-22

## 2021-04-22 ENCOUNTER — TELEPHONE (OUTPATIENT)
Dept: UROLOGY | Facility: CLINIC | Age: 71
End: 2021-04-22

## 2021-04-22 DIAGNOSIS — R97.20 ELEVATED PSA: Primary | ICD-10-CM

## 2021-04-26 ENCOUNTER — TELEPHONE (OUTPATIENT)
Dept: INTERNAL MEDICINE | Facility: CLINIC | Age: 71
End: 2021-04-26

## 2021-04-26 DIAGNOSIS — R97.20 ELEVATED PSA: Primary | ICD-10-CM

## 2021-04-28 ENCOUNTER — PATIENT MESSAGE (OUTPATIENT)
Dept: RESEARCH | Facility: HOSPITAL | Age: 71
End: 2021-04-28

## 2021-04-30 ENCOUNTER — TELEPHONE (OUTPATIENT)
Dept: FAMILY MEDICINE | Facility: CLINIC | Age: 71
End: 2021-04-30

## 2021-04-30 ENCOUNTER — TELEPHONE (OUTPATIENT)
Dept: ENDOSCOPY | Facility: HOSPITAL | Age: 71
End: 2021-04-30

## 2021-05-05 ENCOUNTER — TELEPHONE (OUTPATIENT)
Dept: ENDOSCOPY | Facility: HOSPITAL | Age: 71
End: 2021-05-05

## 2021-05-09 ENCOUNTER — PATIENT MESSAGE (OUTPATIENT)
Dept: ENDOSCOPY | Facility: HOSPITAL | Age: 71
End: 2021-05-09

## 2021-12-22 ENCOUNTER — PATIENT MESSAGE (OUTPATIENT)
Dept: ENDOSCOPY | Facility: HOSPITAL | Age: 71
End: 2021-12-22
Payer: COMMERCIAL

## 2022-04-20 DIAGNOSIS — I10 HYPERTENSION, ESSENTIAL: ICD-10-CM

## 2022-10-14 ENCOUNTER — LAB VISIT (OUTPATIENT)
Dept: LAB | Facility: HOSPITAL | Age: 72
End: 2022-10-14
Attending: INTERNAL MEDICINE
Payer: COMMERCIAL

## 2022-10-14 ENCOUNTER — OFFICE VISIT (OUTPATIENT)
Dept: INTERNAL MEDICINE | Facility: CLINIC | Age: 72
End: 2022-10-14
Payer: COMMERCIAL

## 2022-10-14 VITALS
SYSTOLIC BLOOD PRESSURE: 130 MMHG | WEIGHT: 133.81 LBS | DIASTOLIC BLOOD PRESSURE: 82 MMHG | HEIGHT: 72 IN | BODY MASS INDEX: 18.13 KG/M2 | HEART RATE: 73 BPM | OXYGEN SATURATION: 99 %

## 2022-10-14 DIAGNOSIS — R63.4 WEIGHT LOSS: ICD-10-CM

## 2022-10-14 DIAGNOSIS — Z72.0 CONTINUOUS TOBACCO ABUSE: ICD-10-CM

## 2022-10-14 DIAGNOSIS — R55 SYNCOPE AND COLLAPSE: Primary | ICD-10-CM

## 2022-10-14 DIAGNOSIS — K21.9 GASTRIC REFLUX: ICD-10-CM

## 2022-10-14 DIAGNOSIS — R13.13 PHARYNGEAL DYSPHAGIA: ICD-10-CM

## 2022-10-14 DIAGNOSIS — R97.20 ELEVATED PSA: ICD-10-CM

## 2022-10-14 LAB
ALBUMIN SERPL BCP-MCNC: 3.8 G/DL (ref 3.5–5.2)
ALP SERPL-CCNC: 60 U/L (ref 55–135)
ALT SERPL W/O P-5'-P-CCNC: 17 U/L (ref 10–44)
ANION GAP SERPL CALC-SCNC: 11 MMOL/L (ref 8–16)
AST SERPL-CCNC: 23 U/L (ref 10–40)
BASOPHILS # BLD AUTO: 0.03 K/UL (ref 0–0.2)
BASOPHILS NFR BLD: 0.4 % (ref 0–1.9)
BILIRUB SERPL-MCNC: 0.7 MG/DL (ref 0.1–1)
BUN SERPL-MCNC: 11 MG/DL (ref 8–23)
CALCIUM SERPL-MCNC: 9.5 MG/DL (ref 8.7–10.5)
CHLORIDE SERPL-SCNC: 102 MMOL/L (ref 95–110)
CO2 SERPL-SCNC: 23 MMOL/L (ref 23–29)
CREAT SERPL-MCNC: 0.9 MG/DL (ref 0.5–1.4)
DIFFERENTIAL METHOD: ABNORMAL
EOSINOPHIL # BLD AUTO: 0.2 K/UL (ref 0–0.5)
EOSINOPHIL NFR BLD: 2.3 % (ref 0–8)
ERYTHROCYTE [DISTWIDTH] IN BLOOD BY AUTOMATED COUNT: 15.3 % (ref 11.5–14.5)
EST. GFR  (NO RACE VARIABLE): >60 ML/MIN/1.73 M^2
GLUCOSE SERPL-MCNC: 81 MG/DL (ref 70–110)
HCT VFR BLD AUTO: 44.4 % (ref 40–54)
HGB BLD-MCNC: 14.5 G/DL (ref 14–18)
IMM GRANULOCYTES # BLD AUTO: 0.02 K/UL (ref 0–0.04)
IMM GRANULOCYTES NFR BLD AUTO: 0.2 % (ref 0–0.5)
LYMPHOCYTES # BLD AUTO: 2.5 K/UL (ref 1–4.8)
LYMPHOCYTES NFR BLD: 30.5 % (ref 18–48)
MCH RBC QN AUTO: 29.1 PG (ref 27–31)
MCHC RBC AUTO-ENTMCNC: 32.7 G/DL (ref 32–36)
MCV RBC AUTO: 89 FL (ref 82–98)
MONOCYTES # BLD AUTO: 0.5 K/UL (ref 0.3–1)
MONOCYTES NFR BLD: 5.7 % (ref 4–15)
NEUTROPHILS # BLD AUTO: 4.9 K/UL (ref 1.8–7.7)
NEUTROPHILS NFR BLD: 60.9 % (ref 38–73)
NRBC BLD-RTO: 0 /100 WBC
PLATELET # BLD AUTO: 243 K/UL (ref 150–450)
PMV BLD AUTO: 10.4 FL (ref 9.2–12.9)
POTASSIUM SERPL-SCNC: 4.3 MMOL/L (ref 3.5–5.1)
PROT SERPL-MCNC: 8 G/DL (ref 6–8.4)
RBC # BLD AUTO: 4.99 M/UL (ref 4.6–6.2)
SODIUM SERPL-SCNC: 136 MMOL/L (ref 136–145)
TSH SERPL DL<=0.005 MIU/L-ACNC: 1.7 UIU/ML (ref 0.4–4)
WBC # BLD AUTO: 8.11 K/UL (ref 3.9–12.7)

## 2022-10-14 PROCEDURE — 36415 COLL VENOUS BLD VENIPUNCTURE: CPT | Mod: HCNC | Performed by: INTERNAL MEDICINE

## 2022-10-14 PROCEDURE — 84443 ASSAY THYROID STIM HORMONE: CPT | Mod: HCNC | Performed by: INTERNAL MEDICINE

## 2022-10-14 PROCEDURE — 85025 COMPLETE CBC W/AUTO DIFF WBC: CPT | Mod: HCNC | Performed by: INTERNAL MEDICINE

## 2022-10-14 PROCEDURE — 93010 EKG 12-LEAD: ICD-10-PCS | Mod: HCNC,S$GLB,, | Performed by: INTERNAL MEDICINE

## 2022-10-14 PROCEDURE — 93005 EKG 12-LEAD: ICD-10-PCS | Mod: HCNC,S$GLB,, | Performed by: INTERNAL MEDICINE

## 2022-10-14 PROCEDURE — 99214 OFFICE O/P EST MOD 30 MIN: CPT | Mod: S$PBB,HCNC,, | Performed by: INTERNAL MEDICINE

## 2022-10-14 PROCEDURE — 99214 PR OFFICE/OUTPT VISIT, EST, LEVL IV, 30-39 MIN: ICD-10-PCS | Mod: S$PBB,HCNC,, | Performed by: INTERNAL MEDICINE

## 2022-10-14 PROCEDURE — 80053 COMPREHEN METABOLIC PANEL: CPT | Mod: HCNC | Performed by: INTERNAL MEDICINE

## 2022-10-14 PROCEDURE — 99999 PR PBB SHADOW E&M-EST. PATIENT-LVL V: CPT | Mod: PBBFAC,HCNC,, | Performed by: INTERNAL MEDICINE

## 2022-10-14 PROCEDURE — 99999 PR PBB SHADOW E&M-EST. PATIENT-LVL V: ICD-10-PCS | Mod: PBBFAC,HCNC,, | Performed by: INTERNAL MEDICINE

## 2022-10-14 PROCEDURE — 93010 ELECTROCARDIOGRAM REPORT: CPT | Mod: HCNC,S$GLB,, | Performed by: INTERNAL MEDICINE

## 2022-10-14 PROCEDURE — 93005 ELECTROCARDIOGRAM TRACING: CPT | Mod: HCNC,S$GLB,, | Performed by: INTERNAL MEDICINE

## 2022-10-14 RX ORDER — PANTOPRAZOLE SODIUM 40 MG/1
40 TABLET, DELAYED RELEASE ORAL DAILY
Qty: 30 TABLET | Refills: 1 | Status: ON HOLD | OUTPATIENT
Start: 2022-10-14 | End: 2022-11-29

## 2022-10-14 NOTE — PROGRESS NOTES
Subjective:       Patient ID: Jose Young is a 71 y.o. male.    Chief Complaint: Fatigue, Fall, Choking, and Weight Loss      HPI  Jose Young is a 71 y.o. year old male with HTN, HLD    Patient visiting from [place] - here with daughter  Followed at [facility] - Dr. Malorie albert - had EGD/C-scope done last year, unsure of results of EGD, states colonoscopy without polyps.   Was     Went to [facility], referred to GI, insurance was not covering  Went to [facility], [facility], had [facility]GD/C-scope  Didn't go back to follow up    Was scheduled to establish care with Dr. Katie Sun at [facility], no showed the appointment  Daughter got concerned and scheduled urgent care appointment today.     Review of Systems   Gastrointestinal:  Positive for abdominal pain.   Neurological:  Positive for syncope and light-headedness.       Past Medical History:   Diagnosis Date    Hypertension         Prior to Admission medications    Medication Sig Start Date End Date Taking? Authorizing Provider   ascorbic acid, vitamin C, (VITAMIN C) 250 MG tablet Take 250 mg by mouth once daily.   Yes Historical Provider   atorvastatin (LIPITOR) 40 MG tablet TAKE 1 TABLET(40 MG) BY MOUTH EVERY DAY 11/16/20  Yes Tory Recinos MD   b complex vitamins tablet Take 1 tablet by mouth once daily.   Yes Historical Provider   erythromycin (ROMYCIN) ophthalmic ointment APPLY A 3 MM RIBBON TO THE AFFECTED EYE AT NIGHT FOR 7 DAYS 2/4/21  Yes Historical Provider   hydroCHLOROthiazide (MICROZIDE) 12.5 mg capsule TAKE 1 CAPSULE(12.5 MG) BY MOUTH EVERY DAY 11/16/20  Yes Tory Recinos MD   vitamin D (VITAMIN D3) 1000 units Tab Take 1,000 Units by mouth once daily.   Yes Historical Provider   cyproheptadine (PERIACTIN) 4 mg tablet Take 4 mg by mouth 3 (three) times daily. 4/18/21   Historical Provider   ofloxacin (OCUFLOX) 0.3 % ophthalmic solution INSTILL 1 DROP INTO THE RIGHT EYE FOUR TIMES DAILY FOR 7  DAYS 2/4/21   Historical Provider        Past medical history, surgical history, and family medical history reviewed and updated as appropriate.    Medications and allergies reviewed.     Objective:          Vitals:    10/14/22 1329   BP: 130/82   BP Location: Right arm   Patient Position: Sitting   Pulse: 73   SpO2: 99%   Weight: 60.7 kg (133 lb 13.1 oz)   Height: 6' (1.829 m)     Body mass index is 18.15 kg/m².  Physical Exam  Constitutional:       Comments: thin   HENT:      Head: Normocephalic.   Eyes:      Extraocular Movements: Extraocular movements intact.   Abdominal:      General: There is no distension.      Palpations: Abdomen is soft.      Tenderness: There is no abdominal tenderness.   Neurological:      General: No focal deficit present.      Mental Status: He is oriented to person, place, and time.       Lab Results   Component Value Date    WBC 7.05 04/19/2021    HGB 14.3 04/19/2021    HCT 43.9 04/19/2021     04/19/2021    CHOL 245 (H) 08/07/2020    TRIG 89 08/07/2020    HDL 48 08/07/2020    ALT 11 04/19/2021    AST 21 04/19/2021     04/19/2021    K 4.1 04/19/2021     04/19/2021    CREATININE 1.0 04/19/2021    BUN 10 04/19/2021    CO2 25 04/19/2021    TSH 1.648 04/19/2021    PSA 5.3 (H) 08/07/2020       Assessment:       1. Syncope and collapse    2. Pharyngeal dysphagia    3. Weight loss    4. Elevated PSA    5. Gastric reflux    6. Continuous tobacco abuse          Plan:     Jose was seen today for fatigue, fall, choking and weight loss.    Diagnoses and all orders for this visit:    Syncope and collapse  -     Ambulatory referral/consult to Gastroenterology; Future  -     IN OFFICE EKG 12-LEAD (to Muse)    Pharyngeal dysphagia  -     Ambulatory referral/consult to Gastroenterology; Future    Weight loss  -     CBC W/ AUTO DIFFERENTIAL; Future  -     COMPREHENSIVE METABOLIC PANEL; Future  -     TSH; Future  -     Urinalysis; Future    Elevated PSA  -     Ambulatory  referral/consult to Urology; Future  -     Urinalysis; Future    Gastric reflux  -     Discontinue: pantoprazole (PROTONIX) 40 MG tablet; Take 1 tablet (40 mg total) by mouth once daily. 30 minutes before any food.    Continuous tobacco abuse  Comments:  smoker since age 22, quit 5 years ago, smoking 1 cigar a day  Orders:  -     Urinalysis; Future    Urgent referral to GI for dysphagia, weight loss, syncope. Labs to r/o bleed.    Health maintenance reviewed with patient.    Follow up if symptoms worsen or fail to improve.    Berry Segundo MD  Internal Medicine / Primary Care  [facility]  10/14/2022

## 2022-10-17 ENCOUNTER — OFFICE VISIT (OUTPATIENT)
Dept: GASTROENTEROLOGY | Facility: CLINIC | Age: 72
End: 2022-10-17
Payer: COMMERCIAL

## 2022-10-17 ENCOUNTER — TELEPHONE (OUTPATIENT)
Dept: ENDOSCOPY | Facility: HOSPITAL | Age: 72
End: 2022-10-17
Payer: MEDICARE

## 2022-10-17 VITALS
BODY MASS INDEX: 18.04 KG/M2 | SYSTOLIC BLOOD PRESSURE: 101 MMHG | WEIGHT: 133.19 LBS | DIASTOLIC BLOOD PRESSURE: 63 MMHG | HEART RATE: 75 BPM | HEIGHT: 72 IN

## 2022-10-17 VITALS — WEIGHT: 133 LBS | BODY MASS INDEX: 18.01 KG/M2 | HEIGHT: 72 IN

## 2022-10-17 DIAGNOSIS — R63.4 WEIGHT LOSS: ICD-10-CM

## 2022-10-17 DIAGNOSIS — R13.13 PHARYNGEAL DYSPHAGIA: ICD-10-CM

## 2022-10-17 DIAGNOSIS — R63.4 WEIGHT LOSS: Primary | ICD-10-CM

## 2022-10-17 DIAGNOSIS — R55 SYNCOPE AND COLLAPSE: ICD-10-CM

## 2022-10-17 DIAGNOSIS — Z12.11 SPECIAL SCREENING FOR MALIGNANT NEOPLASMS, COLON: Primary | ICD-10-CM

## 2022-10-17 PROCEDURE — 3078F DIAST BP <80 MM HG: CPT | Mod: HCNC,CPTII,S$GLB, | Performed by: INTERNAL MEDICINE

## 2022-10-17 PROCEDURE — 1159F MED LIST DOCD IN RCRD: CPT | Mod: HCNC,CPTII,S$GLB, | Performed by: INTERNAL MEDICINE

## 2022-10-17 PROCEDURE — 1159F PR MEDICATION LIST DOCUMENTED IN MEDICAL RECORD: ICD-10-PCS | Mod: HCNC,CPTII,S$GLB, | Performed by: INTERNAL MEDICINE

## 2022-10-17 PROCEDURE — 1126F PR PAIN SEVERITY QUANTIFIED, NO PAIN PRESENT: ICD-10-PCS | Mod: HCNC,CPTII,S$GLB, | Performed by: INTERNAL MEDICINE

## 2022-10-17 PROCEDURE — 3078F PR MOST RECENT DIASTOLIC BLOOD PRESSURE < 80 MM HG: ICD-10-PCS | Mod: HCNC,CPTII,S$GLB, | Performed by: INTERNAL MEDICINE

## 2022-10-17 PROCEDURE — 3074F SYST BP LT 130 MM HG: CPT | Mod: HCNC,CPTII,S$GLB, | Performed by: INTERNAL MEDICINE

## 2022-10-17 PROCEDURE — 99999 PR PBB SHADOW E&M-EST. PATIENT-LVL IV: ICD-10-PCS | Mod: PBBFAC,HCNC,, | Performed by: INTERNAL MEDICINE

## 2022-10-17 PROCEDURE — 99999 PR PBB SHADOW E&M-EST. PATIENT-LVL IV: CPT | Mod: PBBFAC,HCNC,, | Performed by: INTERNAL MEDICINE

## 2022-10-17 PROCEDURE — 99204 PR OFFICE/OUTPT VISIT, NEW, LEVL IV, 45-59 MIN: ICD-10-PCS | Mod: HCNC,S$GLB,, | Performed by: INTERNAL MEDICINE

## 2022-10-17 PROCEDURE — 1100F PTFALLS ASSESS-DOCD GE2>/YR: CPT | Mod: HCNC,CPTII,S$GLB, | Performed by: INTERNAL MEDICINE

## 2022-10-17 PROCEDURE — 1100F PR PT FALLS ASSESS DOC 2+ FALLS/FALL W/INJURY/YR: ICD-10-PCS | Mod: HCNC,CPTII,S$GLB, | Performed by: INTERNAL MEDICINE

## 2022-10-17 PROCEDURE — 3288F FALL RISK ASSESSMENT DOCD: CPT | Mod: HCNC,CPTII,S$GLB, | Performed by: INTERNAL MEDICINE

## 2022-10-17 PROCEDURE — 1126F AMNT PAIN NOTED NONE PRSNT: CPT | Mod: HCNC,CPTII,S$GLB, | Performed by: INTERNAL MEDICINE

## 2022-10-17 PROCEDURE — 99204 OFFICE O/P NEW MOD 45 MIN: CPT | Mod: HCNC,S$GLB,, | Performed by: INTERNAL MEDICINE

## 2022-10-17 PROCEDURE — 3288F PR FALLS RISK ASSESSMENT DOCUMENTED: ICD-10-PCS | Mod: HCNC,CPTII,S$GLB, | Performed by: INTERNAL MEDICINE

## 2022-10-17 PROCEDURE — 3074F PR MOST RECENT SYSTOLIC BLOOD PRESSURE < 130 MM HG: ICD-10-PCS | Mod: HCNC,CPTII,S$GLB, | Performed by: INTERNAL MEDICINE

## 2022-10-17 RX ORDER — POLYETHYLENE GLYCOL 3350, SODIUM SULFATE ANHYDROUS, SODIUM BICARBONATE, SODIUM CHLORIDE, POTASSIUM CHLORIDE 236; 22.74; 6.74; 5.86; 2.97 G/4L; G/4L; G/4L; G/4L; G/4L
4 POWDER, FOR SOLUTION ORAL ONCE
Qty: 4000 ML | Refills: 0 | Status: SHIPPED | OUTPATIENT
Start: 2022-10-17 | End: 2022-10-17

## 2022-10-17 NOTE — PROGRESS NOTES
[facility] Consultation     Reason for Consult:  The primary encounter diagnosis was Weight loss. Diagnoses of Syncope and collapse and Pharyngeal dysphagia were also pertinent to this visit.    PCP:   Malorie Madera   [address] / [place] [address]    Referring MD:  Berry Segundo Md  [address] [place] [address]    HPI:  This is a 71 y.o. male who presents to GI clinic.    He has dysphagia to solids, feels food is getting stuck in his throat. Denies dysphagia to liquids. Denies dysphagia to pills.  Has to flush his solid food down with water and occasionally vomits.  He has belching.   He is taking an anti-acid medication currently - pantoprazole. He just started it 2 days ago. This has improved his belching somewhat.    He has lost 50lbs unintentionally in the past 5 years.    He has daily brown bowel movements. Denies diarrhea or constipation.    He says he had an EGD and colonoscopy in 2021 and reports he did not have any polyps and required esophageal dilation.    He smokes 1-2 cigars per day. Occasionally smokes cigarettes. He has been smoking cigars for 10 years.       ROS:  Constitutional: No fevers, chills, No weight loss  ENT: No allergies  CV: No chest pain  Pulm: No cough, No shortness of breath  Ophtho: No vision changes  GI: see HPI  Derm: No rash  Heme: No lymphadenopathy, No bruising  MSK: No arthritis  : No dysuria, No hematuria  Endo: No hot or cold intolerance  Neuro: No syncope, No seizure  Psych: No anxiety, No depression    Medical History:  has a past medical history of Hypertension.    Surgical History:  has a past surgical history that includes Upper gastrointestinal endoscopy.    Family History: family history includes Diabetes in his father and sister; Heart failure in his father..   No contributory family history     Social History:  reports that he has been smoking cigars. He does not have any smokeless tobacco history on file. He reports  current alcohol use. He reports that he does not use drugs.    Review of patient's allergies indicates:  No Known Allergies    Current Outpatient Rx   Medication Sig Dispense Refill    ascorbic acid, vitamin C, (VITAMIN C) 250 MG tablet Take 250 mg by mouth once daily.      atorvastatin (LIPITOR) 40 MG tablet TAKE 1 TABLET(40 MG) BY MOUTH EVERY DAY 30 tablet 0    b complex vitamins tablet Take 1 tablet by mouth once daily.      cyproheptadine (PERIACTIN) 4 mg tablet Take 4 mg by mouth 3 (three) times daily.      erythromycin (ROMYCIN) ophthalmic ointment APPLY A 3 MM RIBBON TO THE AFFECTED EYE AT NIGHT FOR 7 DAYS      hydroCHLOROthiazide (MICROZIDE) 12.5 mg capsule TAKE 1 CAPSULE(12.5 MG) BY MOUTH EVERY DAY 30 capsule 0    ofloxacin (OCUFLOX) 0.3 % ophthalmic solution INSTILL 1 DROP INTO THE RIGHT EYE FOUR TIMES DAILY FOR 7 DAYS      pantoprazole (PROTONIX) 40 MG tablet Take 1 tablet (40 mg total) by mouth once daily. 30 minutes before any food. 30 tablet 1    vitamin D (VITAMIN D3) 1000 units Tab Take 1,000 Units by mouth once daily.         Objective Findings:    Vital Signs:  /63   Pulse 75   Ht 6' (1.829 m)   Wt 60.4 kg (133 lb 2.5 oz)   BMI 18.06 kg/m²   Body mass index is 18.06 kg/m².    Physical Exam:  General Appearance: well-appearing, NAD  Head:   Normocephalic, without obvious abnormality  Eyes:    No scleral icterus, EOMI  ENT: Neck supple; moist mucus membranes  Lungs: clear to auscultation bilaterally.  Heart:  regular rate and rhythm, S1, S2 normal, no murmurs heard  Abdomen: soft, non-tender, non-distended with bowel sounds in all four quadrants. No hepatosplenomegaly, ascites, or palpable masses  Extremities: 2+ pulses, no clubbing, cyanosis or edema  Skin: No visible rash  Neurologic: no focal motor deficits      Labs:  Lab Results   Component Value Date    WBC 8.11 10/14/2022    HGB 14.5 10/14/2022    HCT 44.4 10/14/2022     10/14/2022    CHOL 245 (H) 08/07/2020    TRIG 89  08/07/2020    HDL 48 08/07/2020    ALT 17 10/14/2022    AST 23 10/14/2022     10/14/2022    K 4.3 10/14/2022     10/14/2022    CREATININE 0.9 10/14/2022    BUN 11 10/14/2022    CO2 23 10/14/2022    TSH 1.699 10/14/2022    PSA 5.3 (H) 08/07/2020       IMAGING/PROCEDURES    Assessment/Plan:  72 yo M who presents to GI clinic.    He has dysphagia to solids, unintentional weight loss and is a smoker. Will order urgent EGD to r/o malignancy. He does mention that he required esophageal dilation in the past at an outside hospital. Will request records.   For his GERD, I counseled him on lifestyle precautions and he will continue protonix for now.    Given his unintentional weight loss and unclear specifics regarding his last colonoscopy, will also order colonoscopy to r/o colon mass.    If EGD and colonoscopy are unrevealing for weight loss, will order CT chest abd pelvis.    Advised him to continue ensure supplementation.    Advised complete tobacco cessation as well.      Order summary:  Orders Placed This Encounter    Ambulatory referral/consult to Endo Procedure          Thank you so much for allowing me to participate in the care of Jose Ramos MD  Gastroenterology   [facility]

## 2022-10-17 NOTE — TELEPHONE ENCOUNTER
Endoscopy procedure scheduled on 10/26/2022 with Dr. Denver Ramos, prep instructions reviewed, Pt verbalized understanding.

## 2022-10-19 ENCOUNTER — TELEPHONE (OUTPATIENT)
Dept: UROLOGY | Facility: CLINIC | Age: 72
End: 2022-10-19

## 2022-10-19 DIAGNOSIS — R97.20 ELEVATED PSA: Primary | ICD-10-CM

## 2022-10-19 NOTE — TELEPHONE ENCOUNTER
Hx and Px   Pt here for elevated psa and weight loss. Phones and computers are down. Pt denies back pain or bone pain. He has lost 20 pounds recently. He is urinating well.     Review of systems:    Negative except for weight loss.     Pt does not have pcp. Instructed to find pcp for general health checkups.     Prostate is 30 gms and flat.   Recommend MRI of prostate and creatinine done prior to check kidney function. Pts daughter will arrange appointment with pcp  and we giselle await mri and do uronav based on results.     Signed Kane Peguero Jr.

## 2022-10-25 ENCOUNTER — TELEPHONE (OUTPATIENT)
Dept: UROLOGY | Facility: CLINIC | Age: 72
End: 2022-10-25
Payer: MEDICARE

## 2022-10-25 ENCOUNTER — TELEPHONE (OUTPATIENT)
Dept: ENDOSCOPY | Facility: HOSPITAL | Age: 72
End: 2022-10-25
Payer: MEDICARE

## 2022-10-25 NOTE — TELEPHONE ENCOUNTER
----- Message from Sue Sal MA sent at 10/25/2022 12:13 PM CDT -----  Contact: [phone]  Can you help with this?  ----- Message -----  From: Tiffany Nix MA  Sent: 10/25/2022   9:58 AM CDT  To: Rachel Goff Staff    Patient's daughter is calling states the medication for the procedure was supposed to be called in and hasn't. Please call and advise.

## 2022-10-26 ENCOUNTER — ANESTHESIA EVENT (OUTPATIENT)
Dept: ENDOSCOPY | Facility: HOSPITAL | Age: 72
End: 2022-10-26
Payer: COMMERCIAL

## 2022-10-26 ENCOUNTER — HOSPITAL ENCOUNTER (OUTPATIENT)
Facility: HOSPITAL | Age: 72
Discharge: HOME | End: 2022-10-26
Attending: INTERNAL MEDICINE | Admitting: INTERNAL MEDICINE
Payer: COMMERCIAL

## 2022-10-26 ENCOUNTER — ANESTHESIA (OUTPATIENT)
Dept: ENDOSCOPY | Facility: HOSPITAL | Age: 72
End: 2022-10-26
Payer: MEDICARE

## 2022-10-26 VITALS
OXYGEN SATURATION: 96 % | DIASTOLIC BLOOD PRESSURE: 58 MMHG | SYSTOLIC BLOOD PRESSURE: 113 MMHG | RESPIRATION RATE: 18 BRPM | HEART RATE: 74 BPM | TEMPERATURE: 98 F

## 2022-10-26 DIAGNOSIS — Z12.11 COLON CANCER SCREENING: ICD-10-CM

## 2022-10-26 DIAGNOSIS — R63.4 WEIGHT LOSS: Primary | ICD-10-CM

## 2022-10-26 DIAGNOSIS — R13.10 DYSPHAGIA, UNSPECIFIED TYPE: Primary | ICD-10-CM

## 2022-10-26 PROCEDURE — 27201089 HC SNARE, DISP (ANY): Mod: HCNC | Performed by: INTERNAL MEDICINE

## 2022-10-26 PROCEDURE — 25000003 PHARM REV CODE 250: Mod: HCNC | Performed by: NURSE ANESTHETIST, CERTIFIED REGISTERED

## 2022-10-26 PROCEDURE — D9220A PRA ANESTHESIA: Mod: PT,HCNC,CRNA, | Performed by: NURSE ANESTHETIST, CERTIFIED REGISTERED

## 2022-10-26 PROCEDURE — 45385 COLONOSCOPY W/LESION REMOVAL: CPT | Mod: HCNC | Performed by: INTERNAL MEDICINE

## 2022-10-26 PROCEDURE — 43248 EGD GUIDE WIRE INSERTION: CPT | Mod: HCNC | Performed by: INTERNAL MEDICINE

## 2022-10-26 PROCEDURE — 37000009 HC ANESTHESIA EA ADD 15 MINS: Mod: HCNC | Performed by: INTERNAL MEDICINE

## 2022-10-26 PROCEDURE — 43248 EGD GUIDE WIRE INSERTION: CPT | Mod: 51,HCNC,, | Performed by: INTERNAL MEDICINE

## 2022-10-26 PROCEDURE — 45385 PR COLONOSCOPY,REMV LESN,SNARE: ICD-10-PCS | Mod: 33,HCNC,, | Performed by: INTERNAL MEDICINE

## 2022-10-26 PROCEDURE — 45385 COLONOSCOPY W/LESION REMOVAL: CPT | Mod: 33,HCNC,, | Performed by: INTERNAL MEDICINE

## 2022-10-26 PROCEDURE — 88305 TISSUE EXAM BY PATHOLOGIST: CPT | Mod: 59,HCNC | Performed by: PATHOLOGY

## 2022-10-26 PROCEDURE — D9220A PRA ANESTHESIA: Mod: PT,HCNC,ANES, | Performed by: ANESTHESIOLOGY

## 2022-10-26 PROCEDURE — 88305 TISSUE EXAM BY PATHOLOGIST: CPT | Mod: 26,HCNC,, | Performed by: PATHOLOGY

## 2022-10-26 PROCEDURE — 63600175 PHARM REV CODE 636 W HCPCS: Mod: HCNC | Performed by: NURSE ANESTHETIST, CERTIFIED REGISTERED

## 2022-10-26 PROCEDURE — C1769 GUIDE WIRE: HCPCS | Mod: HCNC | Performed by: INTERNAL MEDICINE

## 2022-10-26 PROCEDURE — D9220A PRA ANESTHESIA: ICD-10-PCS | Mod: PT,HCNC,ANES, | Performed by: ANESTHESIOLOGY

## 2022-10-26 PROCEDURE — 37000008 HC ANESTHESIA 1ST 15 MINUTES: Mod: HCNC | Performed by: INTERNAL MEDICINE

## 2022-10-26 PROCEDURE — D9220A PRA ANESTHESIA: ICD-10-PCS | Mod: PT,HCNC,CRNA, | Performed by: NURSE ANESTHETIST, CERTIFIED REGISTERED

## 2022-10-26 PROCEDURE — 43248 PR EGD, FLEX, W/DILATION OVER GUIDEWIRE: ICD-10-PCS | Mod: 51,HCNC,, | Performed by: INTERNAL MEDICINE

## 2022-10-26 PROCEDURE — 88305 TISSUE EXAM BY PATHOLOGIST: ICD-10-PCS | Mod: 26,HCNC,, | Performed by: PATHOLOGY

## 2022-10-26 RX ORDER — PROPOFOL 10 MG/ML
INJECTION, EMULSION INTRAVENOUS
Status: DISCONTINUED
Start: 2022-10-26 | End: 2022-10-26 | Stop reason: HOSPADM

## 2022-10-26 RX ORDER — SODIUM CHLORIDE 0.9 % (FLUSH) 0.9 %
3 SYRINGE (ML) INJECTION
Status: CANCELLED | OUTPATIENT
Start: 2022-10-26

## 2022-10-26 RX ORDER — PHENYLEPHRINE HYDROCHLORIDE 10 MG/ML
INJECTION INTRAVENOUS
Status: DISCONTINUED | OUTPATIENT
Start: 2022-10-26 | End: 2022-10-26

## 2022-10-26 RX ORDER — SODIUM CHLORIDE 9 MG/ML
INJECTION, SOLUTION INTRAVENOUS CONTINUOUS
Status: DISCONTINUED | OUTPATIENT
Start: 2022-10-26 | End: 2022-10-26 | Stop reason: HOSPADM

## 2022-10-26 RX ORDER — PROPOFOL 10 MG/ML
VIAL (ML) INTRAVENOUS
Status: DISCONTINUED | OUTPATIENT
Start: 2022-10-26 | End: 2022-10-26

## 2022-10-26 RX ORDER — LIDOCAINE HYDROCHLORIDE 20 MG/ML
INJECTION INTRAVENOUS
Status: DISCONTINUED | OUTPATIENT
Start: 2022-10-26 | End: 2022-10-26

## 2022-10-26 RX ORDER — LIDOCAINE HYDROCHLORIDE 20 MG/ML
INJECTION, SOLUTION EPIDURAL; INFILTRATION; INTRACAUDAL; PERINEURAL
Status: DISCONTINUED
Start: 2022-10-26 | End: 2022-10-26 | Stop reason: HOSPADM

## 2022-10-26 RX ORDER — PHENYLEPHRINE HCL IN 0.9% NACL 1 MG/10 ML
SYRINGE (ML) INTRAVENOUS
Status: DISCONTINUED
Start: 2022-10-26 | End: 2022-10-26 | Stop reason: HOSPADM

## 2022-10-26 RX ADMIN — PROPOFOL 40 MG: 10 INJECTION, EMULSION INTRAVENOUS at 08:10

## 2022-10-26 RX ADMIN — PROPOFOL 20 MG: 10 INJECTION, EMULSION INTRAVENOUS at 08:10

## 2022-10-26 RX ADMIN — PHENYLEPHRINE HYDROCHLORIDE 100 MCG: 10 INJECTION INTRAVENOUS at 08:10

## 2022-10-26 RX ADMIN — PROPOFOL 80 MG: 10 INJECTION, EMULSION INTRAVENOUS at 08:10

## 2022-10-26 RX ADMIN — SODIUM CHLORIDE: 0.9 INJECTION, SOLUTION INTRAVENOUS at 08:10

## 2022-10-26 RX ADMIN — PROPOFOL 10 MG: 10 INJECTION, EMULSION INTRAVENOUS at 08:10

## 2022-10-26 RX ADMIN — PHENYLEPHRINE HYDROCHLORIDE 200 MCG: 10 INJECTION INTRAVENOUS at 08:10

## 2022-10-26 RX ADMIN — LIDOCAINE HYDROCHLORIDE 100 MG: 20 INJECTION, SOLUTION INTRAVENOUS at 08:10

## 2022-10-26 RX ADMIN — PROPOFOL 10 MG: 10 INJECTION, EMULSION INTRAVENOUS at 09:10

## 2022-10-26 RX ADMIN — PHENYLEPHRINE HYDROCHLORIDE 200 MCG: 10 INJECTION INTRAVENOUS at 09:10

## 2022-10-26 NOTE — ANESTHESIA POSTPROCEDURE EVALUATION
Anesthesia Post Evaluation    Patient: Jose Young    Procedure(s) Performed: Procedure(s) (LRB):  EGD (ESOPHAGOGASTRODUODENOSCOPY) (N/A)  COLONOSCOPY (N/A)    Final Anesthesia Type: general      Patient location during evaluation: PACU  Patient participation: Yes- Able to Participate  Level of consciousness: awake and alert and oriented  Post-procedure vital signs: reviewed and stable  Pain management: adequate  Airway patency: patent  JOSHUA mitigation strategies: Intraoperative administration of CPAP, nasopharyngeal airway, or oral appliance during sedation and Multimodal analgesia  PONV status at discharge: No PONV  Anesthetic complications: no      Cardiovascular status: hemodynamically stable  Respiratory status: unassisted  Hydration status: euvolemic  Follow-up not needed.          Vitals Value Taken Time   /58 10/26/22 0947   Temp 36.5 °C (97.7 °F) 10/26/22 0916   Pulse 72 10/26/22 0951   Resp 25 10/26/22 0951   SpO2 95 % 10/26/22 0951   Vitals shown include unvalidated device data.      Event Time   Out of Recovery 09:46:00         Pain/Anitha Score: Anitha Score: 10 (10/26/2022  9:31 AM)

## 2022-10-26 NOTE — H&P
Short Stay Endoscopy History and Physical    PCP - Malorie Madera, DO    Procedure - EGD/Colonoscopy  ASA - per anesthesia  Mallampati - per anesthesia  History of Anesthesia problems - no  Family history Anesthesia problems -  no   Plan of anesthesia - MAC, General    HPI:  This is a 71 y.o. male here for evaluation of :     EGD for solid food dysphagia  Colonoscopy for screening      ROS:  Constitutional: No fevers, chills, No weight loss  CV: No chest pain  Pulm: No cough, No shortness of breath  Ophtho: No vision changes  GI: see HPI  Derm: No rash    Medical History:  has a past medical history of Hypertension.    Surgical History:  has a past surgical history that includes Upper gastrointestinal endoscopy.    Family History: family history includes Diabetes in his father and sister; Heart failure in his father.. Otherwise no colon cancer, inflammatory bowel disease, or GI malignancies.    Social History:  reports that he has been smoking cigars. He does not have any smokeless tobacco history on file. He reports current alcohol use. He reports that he does not use drugs.    Review of patient's allergies indicates:  No Known Allergies    Medications:   Medications Prior to Admission   Medication Sig Dispense Refill Last Dose    ascorbic acid, vitamin C, (VITAMIN C) 250 MG tablet Take 250 mg by mouth once daily.   10/25/2022    b complex vitamins tablet Take 1 tablet by mouth once daily.   10/25/2022    cyproheptadine (PERIACTIN) 4 mg tablet Take 4 mg by mouth 3 (three) times daily.   10/25/2022    vitamin D (VITAMIN D3) 1000 units Tab Take 1,000 Units by mouth once daily.   10/25/2022    atorvastatin (LIPITOR) 40 MG tablet TAKE 1 TABLET(40 MG) BY MOUTH EVERY DAY (Patient not taking: Reported on 10/19/2022) 30 tablet 0     erythromycin (ROMYCIN) ophthalmic ointment APPLY A 3 MM RIBBON TO THE AFFECTED EYE AT NIGHT FOR 7 DAYS   Unknown    hydroCHLOROthiazide (MICROZIDE) 12.5 mg capsule TAKE 1 CAPSULE(12.5 MG) BY  MOUTH EVERY DAY (Patient not taking: Reported on 10/19/2022) 30 capsule 0     ofloxacin (OCUFLOX) 0.3 % ophthalmic solution INSTILL 1 DROP INTO THE RIGHT EYE FOUR TIMES DAILY FOR 7 DAYS   Unknown    pantoprazole (PROTONIX) 40 MG tablet Take 1 tablet (40 mg total) by mouth once daily. 30 minutes before any food. (Patient not taking: Reported on 10/19/2022) 30 tablet 1        Physical Exam:    Vital Signs:   Vitals:    10/26/22 0733   BP: 124/76   Pulse:    Resp:    Temp:        Gen: NAD, lying comfortably  HENT: NCAT, oropharynx clear  Eyes: anicteric sclerae, EOMI grossly  Neck: supple, no visible masses/goiter  Cardiac: RRR  Lungs: non-labored breathing  Abd: soft, NT/ND, normoactive BS  Ext: no LE edema, warm, well perfused  Skin: skin intact on exposed body parts, no visible rashes, lesions  Neuro: A&Ox4, neuro exam grossly intact, moves all extremities  Psych: appropriate mood, affect      Labs:  Lab Results   Component Value Date    WBC 8.11 10/14/2022    HGB 14.5 10/14/2022    HCT 44.4 10/14/2022     10/14/2022    CHOL 245 (H) 08/07/2020    TRIG 89 08/07/2020    HDL 48 08/07/2020    ALT 17 10/14/2022    AST 23 10/14/2022     10/14/2022    K 4.3 10/14/2022     10/14/2022    CREATININE 0.9 10/14/2022    BUN 11 10/14/2022    CO2 23 10/14/2022    TSH 1.699 10/14/2022    PSA 5.3 (H) 08/07/2020       Plan:  EGD for dysphagia  Colonoscopy for screening    I have explained the risks and benefits of endoscopy procedures to the patient including but not limited to bleeding, perforation, infection, and death.  The patient was asked if they understand and allowed to ask any further questions to their satisfaction.    Denver Ramos MD

## 2022-10-26 NOTE — ANESTHESIA PREPROCEDURE EVALUATION
10/26/2022  Pre-operative evaluation for Procedure(s) (LRB):  EGD (ESOPHAGOGASTRODUODENOSCOPY) (N/A)  COLONOSCOPY (N/A)    Jose Young is a 71 y.o. male     Patient Active Problem List   Diagnosis    Cigar smoker motivated to quit    Elevated PSA, less than 10 ng/ml    Hyperlipidemia, mixed    Hypertension, essential       Review of patient's allergies indicates:  No Known Allergies    No current facility-administered medications on file prior to encounter.     Current Outpatient Medications on File Prior to Encounter   Medication Sig Dispense Refill    ascorbic acid, vitamin C, (VITAMIN C) 250 MG tablet Take 250 mg by mouth once daily.      b complex vitamins tablet Take 1 tablet by mouth once daily.      cyproheptadine (PERIACTIN) 4 mg tablet Take 4 mg by mouth 3 (three) times daily.      vitamin D (VITAMIN D3) 1000 units Tab Take 1,000 Units by mouth once daily.      atorvastatin (LIPITOR) 40 MG tablet TAKE 1 TABLET(40 MG) BY MOUTH EVERY DAY (Patient not taking: Reported on 10/19/2022) 30 tablet 0    erythromycin (ROMYCIN) ophthalmic ointment APPLY A 3 MM RIBBON TO THE AFFECTED EYE AT NIGHT FOR 7 DAYS      hydroCHLOROthiazide (MICROZIDE) 12.5 mg capsule TAKE 1 CAPSULE(12.5 MG) BY MOUTH EVERY DAY (Patient not taking: Reported on 10/19/2022) 30 capsule 0    ofloxacin (OCUFLOX) 0.3 % ophthalmic solution INSTILL 1 DROP INTO THE RIGHT EYE FOUR TIMES DAILY FOR 7 DAYS      pantoprazole (PROTONIX) 40 MG tablet Take 1 tablet (40 mg total) by mouth once daily. 30 minutes before any food. (Patient not taking: Reported on 10/19/2022) 30 tablet 1       Past Surgical History:   Procedure Laterality Date    UPPER GASTROINTESTINAL ENDOSCOPY         Social History     Socioeconomic History    Marital status: Single   Tobacco Use    Smoking status: Every Day     Types: Cigars   Substance and Sexual  Activity    Alcohol use: Yes    Drug use: Never    Sexual activity: Not Currently         CBC: No results for input(s): WBC, RBC, HGB, HCT, PLT, MCV, MCH, MCHC in the last 72 hours.    CMP: No results for input(s): NA, K, CL, CO2, BUN, CREATININE, GLU, MG, PHOS, CALCIUM, ALBUMIN, PROT, ALKPHOS, ALT, AST, BILITOT in the last 72 hours.    INR  No results for input(s): PT, INR, PROTIME, APTT in the last 72 hours.        Diagnostic Studies:      EKD Echo:  No results found for this or any previous visit.        Pre-op Assessment    I have reviewed the Patient Summary Reports.    I have reviewed the NPO Status.   I have reviewed the Medications.     Review of Systems  Anesthesia Hx:  History of prior surgery of interest to airway management or planning: Denies Family Hx of Anesthesia complications.   Denies Personal Hx of Anesthesia complications.       Physical Exam  General: Well nourished, Cooperative, Alert and Oriented    Airway:  Mallampati: II   Mouth Opening: Normal  TM Distance: Normal  Tongue: Normal  Neck ROM: Normal ROM    Dental:  Edentulous    Chest/Lungs:  Clear to auscultation, Normal Respiratory Rate    Heart:  Rate: Normal  Rhythm: Regular Rhythm        Anesthesia Plan  Type of Anesthesia, risks & benefits discussed:    Anesthesia Type: Gen Natural Airway  Intra-op Monitoring Plan: Standard ASA Monitors  Post Op Pain Control Plan: multimodal analgesia  Induction:  IV  Informed Consent: Informed consent signed with the Patient and all parties understand the risks and agree with anesthesia plan.  All questions answered.   ASA Score: 2  Day of Surgery Review of History & Physical: H&P Update referred to the surgeon/provider.    Ready For Surgery From Anesthesia Perspective.     .

## 2022-10-26 NOTE — TRANSFER OF CARE
Anesthesia Transfer of Care Note    Patient: Jose Yougn    Procedure(s) Performed: Procedure(s) (LRB):  EGD (ESOPHAGOGASTRODUODENOSCOPY) (N/A)  COLONOSCOPY (N/A)    Patient location: GI    Anesthesia Type: general    Transport from OR: Transported from OR on room air with adequate spontaneous ventilation    Post pain: adequate analgesia    Post assessment: no apparent anesthetic complications    Post vital signs: stable    Level of consciousness: lethargic and responds to stimulation    Nausea/Vomiting: no nausea/vomiting    Complications: none    Transfer of care protocol was followed      Last vitals:   Visit Vitals  /65 (BP Location: Right arm, Patient Position: Lying)   Pulse 62   Temp 36.5 °C (97.7 °F) (Axillary)   Resp 19   SpO2 96%

## 2022-10-26 NOTE — PROVATION PATIENT INSTRUCTIONS
Discharge Summary/Instructions after an Endoscopic Procedure  Patient Name: Jose Young  Patient MRN: [id number]  Patient YOB: 1950 Wednesday, October 26, 2022  Denver Ramos MD  Dear patient,  As a result of recent federal legislation (The Federal Cures Act), you may   receive lab or pathology results from your procedure in your [facility]   account before your physician is able to contact you. Your physician or   their representative will relay the results to you with their   recommendations at their soonest availability.  Thank you,  RESTRICTIONS:  During your procedure today, you received medications for sedation.  These   medications may affect your judgment, balance and coordination.  Therefore,   for 24 hours, you have the following restrictions:   - DO NOT drive a car, operate machinery, make legal/financial decisions,   sign important papers or drink alcohol.    ACTIVITY:  Today: no heavy lifting, straining or running due to procedural   sedation/anesthesia.  The following day: return to full activity including work.  DIET:  Eat and drink normally unless instructed otherwise.     TREATMENT FOR COMMON SIDE EFFECTS:  - Mild abdominal pain, nausea, belching, bloating or excessive gas:  rest,   eat lightly and use a heating pad.  - Sore Throat: treat with throat lozenges and/or gargle with warm salt   water.  - Because air was used during the procedure, expelling large amounts of air   from your rectum or belching is normal.  - If a bowel prep was taken, you may not have a bowel movement for 1-3 days.    This is normal.  SYMPTOMS TO WATCH FOR AND REPORT TO YOUR PHYSICIAN:  1. Abdominal pain or bloating, other than gas cramps.  2. Chest pain.  3. Back pain.  4. Signs of infection such as: chills or fever occurring within 24 hours   after the procedure.  5. Rectal bleeding, which would show as bright red, maroon, or black stools.   (A tablespoon of blood from the rectum is not serious, especially  if   hemorrhoids are present.)  6. Vomiting.  7. Weakness or dizziness.  GO DIRECTLY TO THE NEAREST EMERGENCY ROOM IF YOU HAVE ANY OF THE FOLLOWING:      Difficulty breathing              Chills and/or fever over 101 F   Persistent vomiting and/or vomiting blood   Severe abdominal pain   Severe chest pain   Black, tarry stools   Bleeding- more than one tablespoon   Any other symptom or condition that you feel may need urgent attention  Your doctor recommends these additional instructions:  If any biopsies were taken, your doctors clinic will contact you in 1 to 2   weeks with any results.  - Discharge patient to home.   - Pureed diet.   - Continue present medications.   - Perform esophageal manometry and esophogram for further evaluation.  For questions, problems or results please call your physician - Denver Ramos MD at Work:  [phone].  [facility] can be reached at [phone]     IF A COMPLICATION OR EMERGENCY SITUATION ARISES AND YOU ARE UNABLE TO REACH   YOUR PHYSICIAN - GO DIRECTLY TO THE EMERGENCY ROOM.  Denver Ramos MD  10/26/2022 9:20:46 AM  This report has been verified and signed electronically.  Dear patient,  As a result of recent federal legislation (The Federal Cures Act), you may   receive lab or pathology results from your procedure in your [facility]   account before your physician is able to contact you. Your physician or   their representative will relay the results to you with their   recommendations at their soonest availability.  Thank you,  [employer]OVATION

## 2022-10-27 ENCOUNTER — TELEPHONE (OUTPATIENT)
Dept: ENDOSCOPY | Facility: HOSPITAL | Age: 72
End: 2022-10-27
Payer: MEDICARE

## 2022-10-27 DIAGNOSIS — E78.2 HYPERLIPIDEMIA, MIXED: ICD-10-CM

## 2022-10-27 LAB
FINAL PATHOLOGIC DIAGNOSIS: NORMAL
GROSS: NORMAL
Lab: NORMAL

## 2022-10-27 NOTE — TELEPHONE ENCOUNTER
----- Message from Denver Ramos MD sent at 10/26/2022  7:53 PM CDT -----  Please call patient (daughter also manages his medical care) and assist with scheduling CAT scan to evaluate weight loss. Thanks

## 2022-10-27 NOTE — TELEPHONE ENCOUNTER
Care Due:                  Date            Visit Type   Department     Provider  --------------------------------------------------------------------------------                                EP -                              PRIMARY      [facility] INTERNAL  Last Visit: 04-      CARE ([facility])   JOHN Madera                              EP -                              PRIMARY      [facility] INTERNAL  Next Visit: 12-      CARE ([facility])   JOHN Madera                                                            Last  Test          Frequency    Reason                     Performed    Due Date  --------------------------------------------------------------------------------    Lipid Panel.  12 months..  atorvastatin.............  Not Found    Overdue    [employer] Embedded Care Gaps. Reference number: [id number]. 10/27/2022   3:47:53 PM CDT

## 2022-10-28 ENCOUNTER — TELEPHONE (OUTPATIENT)
Dept: ENDOSCOPY | Facility: HOSPITAL | Age: 72
End: 2022-10-28
Payer: MEDICARE

## 2022-10-28 RX ORDER — ATORVASTATIN CALCIUM 40 MG/1
40 TABLET, FILM COATED ORAL DAILY
Qty: 90 TABLET | Refills: 0 | OUTPATIENT
Start: 2022-10-28

## 2022-10-28 NOTE — TELEPHONE ENCOUNTER
----- Message from Starr Dale sent at 10/28/2022  8:56 AM CDT -----  Contact: @[phone]  Caller Dianne coates is calling in to schedule appt from a missed call, please call to discuss further.

## 2022-10-29 DIAGNOSIS — K21.9 GASTRIC REFLUX: ICD-10-CM

## 2022-10-29 NOTE — TELEPHONE ENCOUNTER
No new care gaps identified.  [facility] Embedded Care Gaps. Reference number: [id number]. 10/29/2022   2:32:25 PM CDT

## 2022-10-31 RX ORDER — PANTOPRAZOLE SODIUM 40 MG/1
40 TABLET, DELAYED RELEASE ORAL DAILY
Qty: 30 TABLET | Refills: 1 | OUTPATIENT
Start: 2022-10-31

## 2022-10-31 NOTE — TELEPHONE ENCOUNTER
Provider Staff:     Action required for this patient.    Please note Refusal of medication.            Requested Prescriptions     Refused Prescriptions Disp Refills    pantoprazole (PROTONIX) 40 MG tablet 30 tablet 1     Sig: Take 1 tablet (40 mg total) by mouth once daily. 30 minutes before any food.     Refused By: REJI FRIAS     Reason for Refusal: Patient needs an appointment      Thanks!  [facility]   Note composed: 10/31/2022 1:10 PM

## 2022-10-31 NOTE — TELEPHONE ENCOUNTER
Refill Routing Note   Medication(s) are not appropriate for processing by [facility] for the following reason(s):      - Medication is a new start (<3 months)    [facility] action(s):  Defer          Medication reconciliation completed: No     Appointments  past 12m or future 3m with PCP    Date Provider   Last Visit   4/19/2021 Malorie Madera, DO   Next Visit   10/29/2022 Malorie Madera, DO   ED visits in past 90 days: 0        Note composed:11:10 AM 10/31/2022

## 2022-11-01 ENCOUNTER — HOSPITAL ENCOUNTER (OUTPATIENT)
Dept: RADIOLOGY | Facility: HOSPITAL | Age: 72
Discharge: HOME | End: 2022-11-01
Attending: INTERNAL MEDICINE
Payer: COMMERCIAL

## 2022-11-01 DIAGNOSIS — R63.4 WEIGHT LOSS: ICD-10-CM

## 2022-11-01 PROCEDURE — 74177 CT CHEST ABDOMEN PELVIS WITH CONTRAST (XPD): ICD-10-PCS | Mod: 26,HCNC,, | Performed by: RADIOLOGY

## 2022-11-01 PROCEDURE — 25500020 PHARM REV CODE 255: Mod: HCNC | Performed by: INTERNAL MEDICINE

## 2022-11-01 PROCEDURE — 74177 CT ABD & PELVIS W/CONTRAST: CPT | Mod: TC,HCNC

## 2022-11-01 PROCEDURE — 74177 CT ABD & PELVIS W/CONTRAST: CPT | Mod: 26,HCNC,, | Performed by: RADIOLOGY

## 2022-11-01 PROCEDURE — 71260 CT THORAX DX C+: CPT | Mod: TC,HCNC

## 2022-11-01 PROCEDURE — 71260 CT THORAX DX C+: CPT | Mod: 26,HCNC,, | Performed by: RADIOLOGY

## 2022-11-01 PROCEDURE — 71260 CT CHEST ABDOMEN PELVIS WITH CONTRAST (XPD): ICD-10-PCS | Mod: 26,HCNC,, | Performed by: RADIOLOGY

## 2022-11-01 RX ADMIN — IOHEXOL 100 ML: 350 INJECTION, SOLUTION INTRAVENOUS at 08:11

## 2022-11-03 ENCOUNTER — PATIENT MESSAGE (OUTPATIENT)
Dept: ENDOSCOPY | Facility: HOSPITAL | Age: 72
End: 2022-11-03
Payer: MEDICARE

## 2022-11-03 DIAGNOSIS — N28.89 RENAL MASS: Primary | ICD-10-CM

## 2022-11-07 ENCOUNTER — HOSPITAL ENCOUNTER (OUTPATIENT)
Facility: HOSPITAL | Age: 72
Discharge: HOME | End: 2022-11-07
Attending: INTERNAL MEDICINE | Admitting: INTERNAL MEDICINE
Payer: COMMERCIAL

## 2022-11-07 ENCOUNTER — PATIENT MESSAGE (OUTPATIENT)
Dept: UROLOGY | Facility: CLINIC | Age: 72
End: 2022-11-07
Payer: MEDICARE

## 2022-11-07 VITALS
DIASTOLIC BLOOD PRESSURE: 71 MMHG | TEMPERATURE: 98 F | RESPIRATION RATE: 14 BRPM | HEIGHT: 72 IN | OXYGEN SATURATION: 99 % | HEART RATE: 72 BPM | BODY MASS INDEX: 18.28 KG/M2 | SYSTOLIC BLOOD PRESSURE: 124 MMHG | WEIGHT: 135 LBS

## 2022-11-07 DIAGNOSIS — R13.10 DYSPHAGIA: ICD-10-CM

## 2022-11-07 PROCEDURE — 91010 PR ESOPHAGEAL MOTILITY STUDY, MA2METRY: ICD-10-PCS | Mod: 26,HCNC,, | Performed by: INTERNAL MEDICINE

## 2022-11-07 PROCEDURE — 91010 ESOPHAGUS MOTILITY STUDY: CPT | Mod: TC,HCNC | Performed by: INTERNAL MEDICINE

## 2022-11-07 PROCEDURE — 91037 PR GERD TST W/ NASAL IMPEDENCE ELECTROD: ICD-10-PCS | Mod: 26,HCNC,, | Performed by: INTERNAL MEDICINE

## 2022-11-07 PROCEDURE — 25000003 PHARM REV CODE 250: Mod: HCNC | Performed by: INTERNAL MEDICINE

## 2022-11-07 PROCEDURE — 91037 ESOPH IMPED FUNCTION TEST: CPT | Mod: 26,HCNC,, | Performed by: INTERNAL MEDICINE

## 2022-11-07 PROCEDURE — 91010 ESOPHAGUS MOTILITY STUDY: CPT | Mod: 26,HCNC,, | Performed by: INTERNAL MEDICINE

## 2022-11-07 PROCEDURE — 91037 ESOPH IMPED FUNCTION TEST: CPT | Mod: TC,HCNC | Performed by: INTERNAL MEDICINE

## 2022-11-07 RX ORDER — SODIUM CHLORIDE 9 MG/ML
INJECTION, SOLUTION INTRAVENOUS CONTINUOUS
Status: DISCONTINUED | OUTPATIENT
Start: 2022-11-07 | End: 2022-11-07 | Stop reason: HOSPADM

## 2022-11-07 RX ORDER — LIDOCAINE HYDROCHLORIDE 20 MG/ML
JELLY TOPICAL ONCE
Status: DISCONTINUED | OUTPATIENT
Start: 2022-11-07 | End: 2022-11-07 | Stop reason: HOSPADM

## 2022-11-07 RX ADMIN — SODIUM CHLORIDE: 0.9 INJECTION, SOLUTION INTRAVENOUS at 09:11

## 2022-11-07 NOTE — H&P
Short Stay Endoscopy History and Physical    PCP - Malorie Madera, DO     Procedure - Esophageal manometry  ASA - per anesthesia  Mallampati - per anesthesia  History of Anesthesia problems - no  Family history Anesthesia problems -  no   Plan of anesthesia - General    HPI:  This is a 71 y.o. male here for evaluation of : Dysphagia        ROS:  Constitutional: No fevers, chills, No weight loss  CV: No chest pain  Pulm: No cough, No shortness of breath  Ophtho: No vision changes  GI: see HPI  Derm: No rash    Medical History:  has a past medical history of Hypertension.    Surgical History:  has a past surgical history that includes Upper gastrointestinal endoscopy; Esophagogastroduodenoscopy (N/A, 10/26/2022); and Colonoscopy (N/A, 10/26/2022).    Family History: family history includes Diabetes in his father and sister; Heart failure in his father.. Otherwise no colon cancer, inflammatory bowel disease, or GI malignancies.    Social History:  reports that he has been smoking cigars. He does not have any smokeless tobacco history on file. He reports current alcohol use. He reports that he does not use drugs.    Review of patient's allergies indicates:  No Known Allergies    Medications:   Medications Prior to Admission   Medication Sig Dispense Refill Last Dose    ascorbic acid, vitamin C, (VITAMIN C) 250 MG tablet Take 250 mg by mouth once daily.       atorvastatin (LIPITOR) 40 MG tablet TAKE 1 TABLET(40 MG) BY MOUTH EVERY DAY (Patient not taking: Reported on 10/19/2022) 30 tablet 0     b complex vitamins tablet Take 1 tablet by mouth once daily.       cyproheptadine (PERIACTIN) 4 mg tablet Take 4 mg by mouth 3 (three) times daily.       erythromycin (ROMYCIN) ophthalmic ointment APPLY A 3 MM RIBBON TO THE AFFECTED EYE AT NIGHT FOR 7 DAYS       hydroCHLOROthiazide (MICROZIDE) 12.5 mg capsule TAKE 1 CAPSULE(12.5 MG) BY MOUTH EVERY DAY (Patient not taking: Reported on 10/19/2022) 30 capsule 0     ofloxacin  (OCUFLOX) 0.3 % ophthalmic solution INSTILL 1 DROP INTO THE RIGHT EYE FOUR TIMES DAILY FOR 7 DAYS       pantoprazole (PROTONIX) 40 MG tablet Take 1 tablet (40 mg total) by mouth once daily. 30 minutes before any food. (Patient not taking: Reported on 10/19/2022) 30 tablet 1     vitamin D (VITAMIN D3) 1000 units Tab Take 1,000 Units by mouth once daily.          Physical Exam:    Vital Signs: There were no vitals filed for this visit.    Gen: NAD, lying comfortably  HENT: NCAT, oropharynx clear  Eyes: anicteric sclerae, EOMI grossly  Neck: supple, no visible masses/goiter  Cardiac: RRR  Lungs: non-labored breathing  Abd: soft, NT/ND, normoactive BS  Ext: no LE edema, warm, well perfused  Skin: skin intact on exposed body parts, no visible rashes, lesions  Neuro: A&Ox4, neuro exam grossly intact, moves all extremities  Psych: appropriate mood, affect      Labs:  Lab Results   Component Value Date    WBC 8.11 10/14/2022    HGB 14.5 10/14/2022    HCT 44.4 10/14/2022     10/14/2022    CHOL 245 (H) 08/07/2020    TRIG 89 08/07/2020    HDL 48 08/07/2020    ALT 17 10/14/2022    AST 23 10/14/2022     10/14/2022    K 4.3 10/14/2022     10/14/2022    CREATININE 0.9 10/14/2022    BUN 11 10/14/2022    CO2 23 10/14/2022    TSH 1.699 10/14/2022    PSA 5.3 (H) 08/07/2020       Plan:  Esophageal manometry for dysphagia    I have explained the risks and benefits of endoscopy procedures to the patient including but not limited to bleeding, perforation, infection, and death.  The patient was asked if they understand and allowed to ask any further questions to their satisfaction.      Delores Lomeli MD

## 2022-11-14 ENCOUNTER — HOSPITAL ENCOUNTER (OUTPATIENT)
Dept: RADIOLOGY | Facility: HOSPITAL | Age: 72
Discharge: HOME | End: 2022-11-14
Attending: INTERNAL MEDICINE
Payer: COMMERCIAL

## 2022-11-14 DIAGNOSIS — Z12.11 COLON CANCER SCREENING: ICD-10-CM

## 2022-11-14 PROCEDURE — 74220 FL ESOPHAGRAM WITH BARIUM TABLET: ICD-10-PCS | Mod: 26,HCNC,, | Performed by: RADIOLOGY

## 2022-11-14 PROCEDURE — 25500020 PHARM REV CODE 255: Mod: HCNC | Performed by: INTERNAL MEDICINE

## 2022-11-14 PROCEDURE — 74220 X-RAY XM ESOPHAGUS 1CNTRST: CPT | Mod: 26,HCNC,, | Performed by: RADIOLOGY

## 2022-11-14 PROCEDURE — A9698 NON-RAD CONTRAST MATERIALNOC: HCPCS | Mod: HCNC | Performed by: INTERNAL MEDICINE

## 2022-11-14 PROCEDURE — 74220 X-RAY XM ESOPHAGUS 1CNTRST: CPT | Mod: TC,HCNC

## 2022-11-14 RX ADMIN — BARIUM SULFATE 50 ML: 0.6 SUSPENSION ORAL at 09:11

## 2022-11-15 ENCOUNTER — OFFICE VISIT (OUTPATIENT)
Dept: GASTROENTEROLOGY | Facility: CLINIC | Age: 72
End: 2022-11-15
Payer: MEDICARE

## 2022-11-15 DIAGNOSIS — K22.0 ACHALASIA: Primary | ICD-10-CM

## 2022-11-15 PROCEDURE — 99213 OFFICE O/P EST LOW 20 MIN: CPT | Mod: 95,HCNC,, | Performed by: INTERNAL MEDICINE

## 2022-11-15 PROCEDURE — 1160F RVW MEDS BY RX/DR IN RCRD: CPT | Mod: HCNC,CPTII,95, | Performed by: INTERNAL MEDICINE

## 2022-11-15 PROCEDURE — 1159F PR MEDICATION LIST DOCUMENTED IN MEDICAL RECORD: ICD-10-PCS | Mod: HCNC,CPTII,95, | Performed by: INTERNAL MEDICINE

## 2022-11-15 PROCEDURE — 1159F MED LIST DOCD IN RCRD: CPT | Mod: HCNC,CPTII,95, | Performed by: INTERNAL MEDICINE

## 2022-11-15 PROCEDURE — 1160F PR REVIEW ALL MEDS BY PRESCRIBER/CLIN PHARMACIST DOCUMENTED: ICD-10-PCS | Mod: HCNC,CPTII,95, | Performed by: INTERNAL MEDICINE

## 2022-11-15 PROCEDURE — 99213 PR OFFICE/OUTPT VISIT, EST, LEVL III, 20-29 MIN: ICD-10-PCS | Mod: 95,HCNC,, | Performed by: INTERNAL MEDICINE

## 2022-11-15 NOTE — PROGRESS NOTES
The patient location is: [place]  The chief complaint leading to consultation is: Dysphagia/Achalasia    Visit type: audiovisual    Face to Face time with patient: 15 min  20 minutes of total time spent on the encounter, which includes face to face time and non-face to face time preparing to see the patient (eg, review of tests), Obtaining and/or reviewing separately obtained history, Documenting clinical information in the electronic or other health record, Independently interpreting results (not separately reported) and communicating results to the patient/family/caregiver, or Care coordination (not separately reported).         Each patient to whom he or she provides medical services by telemedicine is:  (1) informed of the relationship between the physician and patient and the respective role of any other health care provider with respect to management of the patient; and (2) notified that he or she may decline to receive medical services by telemedicine and may withdraw from such care at any time.    Notes:       [facility]    CC: Dysphagia    HPI 72 y.o. male with past medical history of HTN who presents with chronic, intermittent, mainly solid food dysphagia with associated regurgitation and weight loss of 40-50lbs over the past 4-5 years.    His dysphagia occurs daily.  Food will go down eventually usually but at times he regurgitates food.  He denies chest pain.  His symptoms have occurred for the past 4-5 years and during this time he has lost 40-50lbs due to his symptoms.  Sometimes he feels ok and eats fine but other times his dysphagia symptoms are significant.  Overall this has greatly affected his life.    EGD 10/26/22 showed fluid in the esophagus, mild resistance to scope passage at the GEJ.  The scope was exchanged for a  scope-on evaluation there was no mass or stricture.  A 45 Fr dilator was used to dilate the esophagus.    Esophageal manometry 22 was consistent with  Type II Achalasia.    Barium esophagram 11/14/22 showed prolonged holdup of the 13mm barium tablet at the GEJ.  The tablet did not pass into the stomach during the study.  The esophagus appeared markedly dilated with minimal contractility.  There was minimal passage of contrast through the GEJ into the stomach.    Achalasia Eckardt Score  Dysphagia  (none (0), occasional (1), daily (2), with every meal (3)): 2  Regurgitation (none (0), occasional (1), daily (2), with every meal (3)): 1  Chest pain (none (0), occasional (1), daily (2), several times per day (3)): 0  Weight loss (kg) (0 (0), <5 (1), 5-10 (2), >10 (3)): 3  Total Eckardt Score(the final score is the sum of four components (0-12)):  6          Past Medical History  Past Medical History:   Diagnosis Date    Hypertension      No pertinent past surgical history    No pertinent family history of colon cancer    Review of Systems  General ROS: negative for chills, fever.  Positive for weight loss  Cardiovascular ROS: no chest pain or dyspnea on exertion  Gastrointestinal ROS: no abdominal pain, change in bowel habits, or black/ bloody stools    Physical Examination  There were no vitals taken for this visit.  General appearance: alert, cooperative, no distress  HENT: Normocephalic, atraumatic, neck symmetrical, no nasal discharge   Neurologic: Alert and oriented X 3, no facial droop or dysarthria    Labs:  Lab Results   Component Value Date    WBC 8.11 10/14/2022    HGB 14.5 10/14/2022    HCT 44.4 10/14/2022    MCV 89 10/14/2022     10/14/2022         CMP  Sodium   Date Value Ref Range Status   10/14/2022 136 136 - 145 mmol/L Final     Potassium   Date Value Ref Range Status   10/14/2022 4.3 3.5 - 5.1 mmol/L Final     Chloride   Date Value Ref Range Status   10/14/2022 102 95 - 110 mmol/L Final     CO2   Date Value Ref Range Status   10/14/2022 23 23 - 29 mmol/L Final     Glucose   Date Value Ref Range Status   10/14/2022 81 70 - 110 mg/dL Final      BUN   Date Value Ref Range Status   10/14/2022 11 8 - 23 mg/dL Final     Creatinine   Date Value Ref Range Status   10/14/2022 0.9 0.5 - 1.4 mg/dL Final     Calcium   Date Value Ref Range Status   10/14/2022 9.5 8.7 - 10.5 mg/dL Final     Total Protein   Date Value Ref Range Status   10/14/2022 8.0 6.0 - 8.4 g/dL Final     Albumin   Date Value Ref Range Status   10/14/2022 3.8 3.5 - 5.2 g/dL Final     Total Bilirubin   Date Value Ref Range Status   10/14/2022 0.7 0.1 - 1.0 mg/dL Final     Comment:     For infants and newborns, interpretation of results should be based  on gestational age, weight and in agreement with clinical  observations.    Premature Infant recommended reference ranges:  Up to 24 hours.............<8.0 mg/dL  Up to 48 hours............<12.0 mg/dL  3-5 days..................<15.0 mg/dL  6-29 days.................<15.0 mg/dL       Alkaline Phosphatase   Date Value Ref Range Status   10/14/2022 60 55 - 135 U/L Final     AST   Date Value Ref Range Status   10/14/2022 23 10 - 40 U/L Final     Comment:     *Result may be interfered by visible hemolysis     ALT   Date Value Ref Range Status   10/14/2022 17 10 - 44 U/L Final     Anion Gap   Date Value Ref Range Status   10/14/2022 11 8 - 16 mmol/L Final     eGFR   Date Value Ref Range Status   10/14/2022 >60.0 >60 mL/min/1.73 m^2 Final         Assessment:   The patient is a 71 yo man with past medical history of HTN who presents with long standing dysphagia with associated regurgitation and weight loss.  Eckardt score 6.  Esophageal manometry and timed barium esophagram are consistent with achalasia.  We discussed treatment options including surgical myotomy, POEM, pneumatic balloon dilation and medical management today.  The patient would like to proceed with a surgical evaluation.    Plan:  -We will arrange for follow up with Surgery to discuss surgical options for his Type II Achalasia.      Delores Lomeli MD

## 2022-11-16 ENCOUNTER — OFFICE VISIT (OUTPATIENT)
Dept: SURGERY | Facility: CLINIC | Age: 72
End: 2022-11-16
Payer: COMMERCIAL

## 2022-11-16 VITALS
WEIGHT: 139.44 LBS | HEART RATE: 73 BPM | SYSTOLIC BLOOD PRESSURE: 131 MMHG | HEIGHT: 72 IN | DIASTOLIC BLOOD PRESSURE: 73 MMHG | BODY MASS INDEX: 18.89 KG/M2

## 2022-11-16 DIAGNOSIS — N28.89 LEFT RENAL MASS: ICD-10-CM

## 2022-11-16 DIAGNOSIS — I10 HYPERTENSION, ESSENTIAL: ICD-10-CM

## 2022-11-16 DIAGNOSIS — K22.0 ACHALASIA: Primary | ICD-10-CM

## 2022-11-16 DIAGNOSIS — E78.2 HYPERLIPIDEMIA, MIXED: ICD-10-CM

## 2022-11-16 PROCEDURE — 1126F PR PAIN SEVERITY QUANTIFIED, NO PAIN PRESENT: ICD-10-PCS | Mod: HCNC,CPTII,S$GLB, | Performed by: SURGERY

## 2022-11-16 PROCEDURE — 99999 PR PBB SHADOW E&M-EST. PATIENT-LVL V: CPT | Mod: PBBFAC,HCNC,, | Performed by: SURGERY

## 2022-11-16 PROCEDURE — 3078F PR MOST RECENT DIASTOLIC BLOOD PRESSURE < 80 MM HG: ICD-10-PCS | Mod: HCNC,CPTII,S$GLB, | Performed by: SURGERY

## 2022-11-16 PROCEDURE — 3008F PR BODY MASS INDEX (BMI) DOCUMENTED: ICD-10-PCS | Mod: HCNC,CPTII,S$GLB, | Performed by: SURGERY

## 2022-11-16 PROCEDURE — 99205 OFFICE O/P NEW HI 60 MIN: CPT | Mod: HCNC,S$GLB,, | Performed by: SURGERY

## 2022-11-16 PROCEDURE — 99999 PR PBB SHADOW E&M-EST. PATIENT-LVL V: ICD-10-PCS | Mod: PBBFAC,HCNC,, | Performed by: SURGERY

## 2022-11-16 PROCEDURE — 99205 PR OFFICE/OUTPT VISIT, NEW, LEVL V, 60-74 MIN: ICD-10-PCS | Mod: HCNC,S$GLB,, | Performed by: SURGERY

## 2022-11-16 PROCEDURE — 1160F RVW MEDS BY RX/DR IN RCRD: CPT | Mod: HCNC,CPTII,S$GLB, | Performed by: SURGERY

## 2022-11-16 PROCEDURE — 3288F FALL RISK ASSESSMENT DOCD: CPT | Mod: HCNC,CPTII,S$GLB, | Performed by: SURGERY

## 2022-11-16 PROCEDURE — 1101F PR PT FALLS ASSESS DOC 0-1 FALLS W/OUT INJ PAST YR: ICD-10-PCS | Mod: HCNC,CPTII,S$GLB, | Performed by: SURGERY

## 2022-11-16 PROCEDURE — 3075F SYST BP GE 130 - 139MM HG: CPT | Mod: HCNC,CPTII,S$GLB, | Performed by: SURGERY

## 2022-11-16 PROCEDURE — 1160F PR REVIEW ALL MEDS BY PRESCRIBER/CLIN PHARMACIST DOCUMENTED: ICD-10-PCS | Mod: HCNC,CPTII,S$GLB, | Performed by: SURGERY

## 2022-11-16 PROCEDURE — 3008F BODY MASS INDEX DOCD: CPT | Mod: HCNC,CPTII,S$GLB, | Performed by: SURGERY

## 2022-11-16 PROCEDURE — 1101F PT FALLS ASSESS-DOCD LE1/YR: CPT | Mod: HCNC,CPTII,S$GLB, | Performed by: SURGERY

## 2022-11-16 PROCEDURE — 1126F AMNT PAIN NOTED NONE PRSNT: CPT | Mod: HCNC,CPTII,S$GLB, | Performed by: SURGERY

## 2022-11-16 PROCEDURE — 3075F PR MOST RECENT SYSTOLIC BLOOD PRESS GE 130-139MM HG: ICD-10-PCS | Mod: HCNC,CPTII,S$GLB, | Performed by: SURGERY

## 2022-11-16 PROCEDURE — 3078F DIAST BP <80 MM HG: CPT | Mod: HCNC,CPTII,S$GLB, | Performed by: SURGERY

## 2022-11-16 PROCEDURE — 1159F PR MEDICATION LIST DOCUMENTED IN MEDICAL RECORD: ICD-10-PCS | Mod: HCNC,CPTII,S$GLB, | Performed by: SURGERY

## 2022-11-16 PROCEDURE — 3288F PR FALLS RISK ASSESSMENT DOCUMENTED: ICD-10-PCS | Mod: HCNC,CPTII,S$GLB, | Performed by: SURGERY

## 2022-11-16 PROCEDURE — 1159F MED LIST DOCD IN RCRD: CPT | Mod: HCNC,CPTII,S$GLB, | Performed by: SURGERY

## 2022-11-17 PROBLEM — N28.89 LEFT RENAL MASS: Status: ACTIVE | Noted: 2022-11-01

## 2022-11-17 PROBLEM — K22.0 ACHALASIA: Status: ACTIVE | Noted: 2022-11-07

## 2022-11-17 RX ORDER — SODIUM CHLORIDE 9 MG/ML
INJECTION, SOLUTION INTRAVENOUS CONTINUOUS
Status: CANCELLED | OUTPATIENT
Start: 2022-11-17

## 2022-11-17 NOTE — PROGRESS NOTES
History & Physical    SUBJECTIVE:     History of Present Illness:  Patient is a 72 year-old male with HTN and HLD who presents for discussion of management for achalasia. He first developed dysphagia primarily to solids about 5 years ago which has slowly progressed in severity until the last 2 months, during which he's experienced daily dysphagia (solids>liquids), daily regurgitation, and overall 50lb weight loss. He denies chest pain, nausea, vomiting, retching, heartburn, or abdominal pain. His Eckardt score is 7/12. His work-up which was personally reviewed includes:    EGD 10/26/22: Mild resistance at the GEJ, fluid in the esophagus, Savory dilation with 45Fr, normal stomach and duodenum.    CT 11/1/22: Distended esophagus with distal esophageal wall thickening with significant regained ingested contents.    HREM 11/7/22: LES basal 38.3 -> IRP 31.4 mmHg, 100% failed swallows, 100% PEP, supragastric belching noted, consistent with type II achalasia    UGI 11/14/22: Prolonged holdup of 13 mm barium tablet at the distal esophagus to the GE junction which did not pass through GE junction into the stomach, marked dilatation and minimal contractility of the entire esophagus, minimal passage of contrast through the GE junction into the stomach, concerning for achalasia.    Chief Complaint   Patient presents with    Dysphagia    Weight Loss     Review of patient's allergies indicates:  No Known Allergies    Current Outpatient Medications   Medication Sig Dispense Refill    ascorbic acid, vitamin C, (VITAMIN C) 250 MG tablet Take 250 mg by mouth once daily.      atorvastatin (LIPITOR) 40 MG tablet TAKE 1 TABLET(40 MG) BY MOUTH EVERY DAY 30 tablet 0    b complex vitamins tablet Take 1 tablet by mouth once daily.      pantoprazole (PROTONIX) 40 MG tablet Take 1 tablet (40 mg total) by mouth once daily. 30 minutes before any food. 30 tablet 1    vitamin D (VITAMIN D3) 1000 units Tab Take 1,000 Units by mouth once daily.       cyproheptadine (PERIACTIN) 4 mg tablet Take 4 mg by mouth 3 (three) times daily.      erythromycin (ROMYCIN) ophthalmic ointment APPLY A 3 MM RIBBON TO THE AFFECTED EYE AT NIGHT FOR 7 DAYS      hydroCHLOROthiazide (MICROZIDE) 12.5 mg capsule TAKE 1 CAPSULE(12.5 MG) BY MOUTH EVERY DAY (Patient not taking: Reported on 2022) 30 capsule 0    ofloxacin (OCUFLOX) 0.3 % ophthalmic solution INSTILL 1 DROP INTO THE RIGHT EYE FOUR TIMES DAILY FOR 7 DAYS       No current facility-administered medications for this visit.     Past Medical History:   Diagnosis Date    Hypertension     Mixed hyperlipidemia      Past Surgical History:   Procedure Laterality Date    COLONOSCOPY N/A 10/26/2022    Procedure: COLONOSCOPY;  Surgeon: Denver Ramos MD;  Location: [facility];  Service: Endoscopy;  Laterality: N/A;    ESOPHAGEAL MANOMETRY WITH MEASUREMENT OF IMPEDANCE N/A 2022    Procedure: MANOMETRY, ESOPHAGUS, WITH IMPEDANCE MEASUREMENT;  Surgeon: Delores Lomeli MD;  Location: [address]);  Service: Endoscopy;  Laterality: N/A;  instructions sent to [facility]    ESOPHAGOGASTRODUODENOSCOPY N/A 10/26/2022    Procedure: EGD (ESOPHAGOGASTRODUODENOSCOPY);  Surgeon: Denver Ramos MD;  Location: [facility];  Service: Endoscopy;  Laterality: N/A;  Medically Urgent  10/17 fully vaccinated; instructions to [facility]-     Family History   Problem Relation Age of Onset    Diabetes Father     Heart failure Father     Diabetes Sister     Colon cancer Neg Hx     Esophageal cancer Neg Hx      Social History     Tobacco Use    Smoking status: Former     Types: Cigars     Quit date: 2022     Years since quittin.2   Substance Use Topics    Alcohol use: Yes     Alcohol/week: 1.0 standard drink     Types: 1 Cans of beer per week    Drug use: Not Currently     Types: Marijuana      Review of Systems:  Review of Systems   Constitutional:  Positive for unexpected weight change (50lb weight loss).   HENT:  Positive for trouble  swallowing.    Eyes: Negative.    Respiratory: Negative.     Cardiovascular: Negative.    Gastrointestinal: Negative.         +regurgitation   Endocrine: Negative.    Genitourinary: Negative.    Musculoskeletal: Negative.    Skin: Negative.    Allergic/Immunologic: Negative.    Neurological: Negative.    Hematological: Negative.    Psychiatric/Behavioral: Negative.       OBJECTIVE:     Vital Signs (Most Recent)  Pulse: 73 (11/16/22 1505)  BP: 131/73 (11/16/22 1505)  6' (1.829 m)  63.3 kg (139 lb 7.1 oz)     Physical Exam:  Physical Exam  Vitals reviewed.   Constitutional:       General: He is not in acute distress.     Appearance: Normal appearance. He is well-developed.      Comments: Very thin   HENT:      Head: Normocephalic and atraumatic.   Eyes:      Conjunctiva/sclera: Conjunctivae normal.   Cardiovascular:      Rate and Rhythm: Normal rate and regular rhythm.      Heart sounds: Normal heart sounds.   Pulmonary:      Effort: Pulmonary effort is normal. No respiratory distress.      Breath sounds: Normal breath sounds.   Abdominal:      General: There is no distension.      Palpations: Abdomen is soft.      Tenderness: There is no abdominal tenderness. There is no guarding.   Musculoskeletal:         General: Normal range of motion.      Cervical back: Normal range of motion and neck supple.   Skin:     General: Skin is warm and dry.   Neurological:      General: No focal deficit present.      Mental Status: He is alert and oriented to person, place, and time.   Psychiatric:         Mood and Affect: Mood normal.         Behavior: Behavior normal.     Laboratory  CBC: Reviewed  CMP: Reviewed    Diagnostic Results:  Labs: Reviewed  CT: Reviewed  Upper GI: Reviewed  EGD + HREM: Reviewed    ASSESSMENT/PLAN:   Mr. Young is a 72 year-old male with type 2 achalasia. We discussed the pathophysiology and natural course of achalasia and the various approaches to management including medical, endoscopic, and  definitive surgical treatment, the latter including Heller myotomy and POEM. We discussed the risks, benefits, alternatives and technical differences for lap HM vs POEM, the risks including but not limited to bleeding, infection, esophageal or gastric perforation which can result in life-threatening mediastinitis or abdominal sepsis, vagal nerve injury which can result in delayed gastric emptying or dumping, hiatal hernia, persistent dysphagia, recurrent achalasia, and chronic GERD. All questions were answered to his and his daughter's satisfaction and he has elected to proceed with POEM. Informed consent was obtained. It was a pleasure meeting Mr. Young and thank you for this consultation.    Dianne Aguirre  11/16/22

## 2022-11-17 NOTE — H&P (VIEW-ONLY)
History & Physical    SUBJECTIVE:     History of Present Illness:  Patient is a 72 year-old male with HTN and HLD who presents for discussion of management for achalasia. He first developed dysphagia primarily to solids about 5 years ago which has slowly progressed in severity until the last 2 months, during which he's experienced daily dysphagia (solids>liquids), daily regurgitation, and overall 50lb weight loss. He denies chest pain, nausea, vomiting, retching, heartburn, or abdominal pain. His Eckardt score is 7/12. His work-up which was personally reviewed includes:    EGD 10/26/22: Mild resistance at the GEJ, fluid in the esophagus, Savory dilation with 45Fr, normal stomach and duodenum.    CT 11/1/22: Distended esophagus with distal esophageal wall thickening with significant regained ingested contents.    HREM 11/7/22: LES basal 38.3 -> IRP 31.4 mmHg, 100% failed swallows, 100% PEP, supragastric belching noted, consistent with type II achalasia    UGI 11/14/22: Prolonged holdup of 13 mm barium tablet at the distal esophagus to the GE junction which did not pass through GE junction into the stomach, marked dilatation and minimal contractility of the entire esophagus, minimal passage of contrast through the GE junction into the stomach, concerning for achalasia.    Chief Complaint   Patient presents with    Dysphagia    Weight Loss     Review of patient's allergies indicates:  No Known Allergies    Current Outpatient Medications   Medication Sig Dispense Refill    ascorbic acid, vitamin C, (VITAMIN C) 250 MG tablet Take 250 mg by mouth once daily.      atorvastatin (LIPITOR) 40 MG tablet TAKE 1 TABLET(40 MG) BY MOUTH EVERY DAY 30 tablet 0    b complex vitamins tablet Take 1 tablet by mouth once daily.      pantoprazole (PROTONIX) 40 MG tablet Take 1 tablet (40 mg total) by mouth once daily. 30 minutes before any food. 30 tablet 1    vitamin D (VITAMIN D3) 1000 units Tab Take 1,000 Units by mouth once daily.       cyproheptadine (PERIACTIN) 4 mg tablet Take 4 mg by mouth 3 (three) times daily.      erythromycin (ROMYCIN) ophthalmic ointment APPLY A 3 MM RIBBON TO THE AFFECTED EYE AT NIGHT FOR 7 DAYS      hydroCHLOROthiazide (MICROZIDE) 12.5 mg capsule TAKE 1 CAPSULE(12.5 MG) BY MOUTH EVERY DAY (Patient not taking: Reported on 2022) 30 capsule 0    ofloxacin (OCUFLOX) 0.3 % ophthalmic solution INSTILL 1 DROP INTO THE RIGHT EYE FOUR TIMES DAILY FOR 7 DAYS       No current facility-administered medications for this visit.     Past Medical History:   Diagnosis Date    Hypertension     Mixed hyperlipidemia      Past Surgical History:   Procedure Laterality Date    COLONOSCOPY N/A 10/26/2022    Procedure: COLONOSCOPY;  Surgeon: Denver Ramos MD;  Location: [facility];  Service: Endoscopy;  Laterality: N/A;    ESOPHAGEAL MANOMETRY WITH MEASUREMENT OF IMPEDANCE N/A 2022    Procedure: MANOMETRY, ESOPHAGUS, WITH IMPEDANCE MEASUREMENT;  Surgeon: Delores Lomeli MD;  Location: [address]);  Service: Endoscopy;  Laterality: N/A;  instructions sent to [facility]    ESOPHAGOGASTRODUODENOSCOPY N/A 10/26/2022    Procedure: EGD (ESOPHAGOGASTRODUODENOSCOPY);  Surgeon: Denver Ramos MD;  Location: [facility];  Service: Endoscopy;  Laterality: N/A;  Medically Urgent  10/17 fully vaccinated; instructions to [place]-     Family History   Problem Relation Age of Onset    Diabetes Father     Heart failure Father     Diabetes Sister     Colon cancer Neg Hx     Esophageal cancer Neg Hx      Social History     Tobacco Use    Smoking status: Former     Types: Cigars     Quit date: 2022     Years since quittin.2   Substance Use Topics    Alcohol use: Yes     Alcohol/week: 1.0 standard drink     Types: 1 Cans of beer per week    Drug use: Not Currently     Types: Marijuana      Review of Systems:  Review of Systems   Constitutional:  Positive for unexpected weight change (50lb weight loss).   HENT:  Positive for trouble  swallowing.    Eyes: Negative.    Respiratory: Negative.     Cardiovascular: Negative.    Gastrointestinal: Negative.         +regurgitation   Endocrine: Negative.    Genitourinary: Negative.    Musculoskeletal: Negative.    Skin: Negative.    Allergic/Immunologic: Negative.    Neurological: Negative.    Hematological: Negative.    Psychiatric/Behavioral: Negative.       OBJECTIVE:     Vital Signs (Most Recent)  Pulse: 73 (11/16/22 1505)  BP: 131/73 (11/16/22 1505)  6' (1.829 m)  63.3 kg (139 lb 7.1 oz)     Physical Exam:  Physical Exam  Vitals reviewed.   Constitutional:       General: He is not in acute distress.     Appearance: Normal appearance. He is well-developed.      Comments: Very thin   HENT:      Head: Normocephalic and atraumatic.   Eyes:      Conjunctiva/sclera: Conjunctivae normal.   Cardiovascular:      Rate and Rhythm: Normal rate and regular rhythm.      Heart sounds: Normal heart sounds.   Pulmonary:      Effort: Pulmonary effort is normal. No respiratory distress.      Breath sounds: Normal breath sounds.   Abdominal:      General: There is no distension.      Palpations: Abdomen is soft.      Tenderness: There is no abdominal tenderness. There is no guarding.   Musculoskeletal:         General: Normal range of motion.      Cervical back: Normal range of motion and neck supple.   Skin:     General: Skin is warm and dry.   Neurological:      General: No focal deficit present.      Mental Status: He is alert and oriented to person, place, and time.   Psychiatric:         Mood and Affect: Mood normal.         Behavior: Behavior normal.     Laboratory  CBC: Reviewed  CMP: Reviewed    Diagnostic Results:  Labs: Reviewed  CT: Reviewed  Upper GI: Reviewed  EGD + HREM: Reviewed    ASSESSMENT/PLAN:   Mr. Young is a 72 year-old male with type 2 achalasia. We discussed the pathophysiology and natural course of achalasia and the various approaches to management including medical, endoscopic, and  definitive surgical treatment, the latter including Heller myotomy and POEM. We discussed the risks, benefits, alternatives and technical differences for lap HM vs POEM, the risks including but not limited to bleeding, infection, esophageal or gastric perforation which can result in life-threatening mediastinitis or abdominal sepsis, vagal nerve injury which can result in delayed gastric emptying or dumping, hiatal hernia, persistent dysphagia, recurrent achalasia, and chronic GERD. All questions were answered to his and his daughter's satisfaction and he has elected to proceed with POEM. Informed consent was obtained. It was a pleasure meeting Mr. Young and thank you for this consultation.    Dianne Aguirre  11/16/22

## 2022-11-18 ENCOUNTER — HOSPITAL ENCOUNTER (OUTPATIENT)
Dept: RADIOLOGY | Facility: HOSPITAL | Age: 72
Discharge: HOME | End: 2022-11-18
Attending: UROLOGY
Payer: COMMERCIAL

## 2022-11-18 ENCOUNTER — TELEPHONE (OUTPATIENT)
Dept: UROLOGY | Facility: CLINIC | Age: 72
End: 2022-11-18
Payer: MEDICARE

## 2022-11-18 DIAGNOSIS — R97.20 ELEVATED PSA: Primary | ICD-10-CM

## 2022-11-18 DIAGNOSIS — R97.20 ELEVATED PSA: ICD-10-CM

## 2022-11-18 PROCEDURE — 25500020 PHARM REV CODE 255: Mod: HCNC | Performed by: UROLOGY

## 2022-11-18 PROCEDURE — 72197 MRI PELVIS W/O & W/DYE: CPT | Mod: TC,HCNC

## 2022-11-18 PROCEDURE — A9585 GADOBUTROL INJECTION: HCPCS | Mod: HCNC | Performed by: UROLOGY

## 2022-11-18 PROCEDURE — 72197 MRI PELVIS W/O & W/DYE: CPT | Mod: 26,HCNC,, | Performed by: RADIOLOGY

## 2022-11-18 PROCEDURE — 72197 MRI PROSTATE W W/O CONTRAST: ICD-10-PCS | Mod: 26,HCNC,, | Performed by: RADIOLOGY

## 2022-11-18 RX ORDER — GADOBUTROL 604.72 MG/ML
10 INJECTION INTRAVENOUS
Status: COMPLETED | OUTPATIENT
Start: 2022-11-18 | End: 2022-11-18

## 2022-11-18 RX ADMIN — GADOBUTROL 10 ML: 604.72 INJECTION INTRAVENOUS at 07:11

## 2022-11-18 NOTE — PROGRESS NOTES
[facility]  Urologic Oncology Clinic Note        Date of Service: 11/21/2022      Chief Complaint/Reason for Consultation: Left Renal Mass and Elevated PSA    Requesting Provider:   No referring provider defined for this encounter.      History of Present Illness:   Patient 72 y.o. male presents with CT scan for unintentional weight loss that incidentally found right renal cyst and left midpole renal mass that is enhancing and concerning for oncocytoma vs RCC.     He is also here for recent elevation in PSA.    No family hx of prostate cancer. No 5ARI use.     Urologic History:     4/19/2021 -- PSA 6.6   11/1/2022 -- CT a/p w/ contrast -- 1.5 cm left mid pole solid renal mass concerning for neoplasm, right renal simple cyst      Patient Active Problem List    Diagnosis Date Noted    Achalasia 11/07/2022    Left renal mass 11/01/2022    Hypertension, essential 08/12/2020    Elevated PSA, less than 10 ng/ml 08/10/2020    Hyperlipidemia, mixed 08/10/2020    Cigar smoker motivated to quit 08/07/2020          Review of patient's allergies indicates:  No Known Allergies     Past Medical History:   Diagnosis Date    Hypertension     Mixed hyperlipidemia       Past Surgical History:   Procedure Laterality Date    COLONOSCOPY N/A 10/26/2022    Procedure: COLONOSCOPY;  Surgeon: Denver Ramos MD;  Location: [facility];  Service: Endoscopy;  Laterality: N/A;    ESOPHAGEAL MANOMETRY WITH MEASUREMENT OF IMPEDANCE N/A 11/07/2022    Procedure: MANOMETRY, ESOPHAGUS, WITH IMPEDANCE MEASUREMENT;  Surgeon: Delores Lomeli MD;  Location: [address]);  Service: Endoscopy;  Laterality: N/A;  instructions sent to [facility]    ESOPHAGOGASTRODUODENOSCOPY N/A 10/26/2022    Procedure: EGD (ESOPHAGOGASTRODUODENOSCOPY);  Surgeon: Denver Ramos MD;  Location: [facility];  Service: Endoscopy;  Laterality: N/A;  Medically Urgent  10/17 fully vaccinated; instructions to [place]-      Family History   Problem Relation Age of  Onset    Diabetes Father     Heart failure Father     Diabetes Sister     Colon cancer Neg Hx     Esophageal cancer Neg Hx       Social History     Tobacco Use    Smoking status: Former     Types: Cigars     Quit date: 2022     Years since quittin.2    Smokeless tobacco: Not on file   Substance Use Topics    Alcohol use: Yes     Alcohol/week: 1.0 standard drink     Types: 1 Cans of beer per week        Review of Systems   Constitutional:  Negative for activity change and unexpected weight change.   HENT:  Negative for congestion.    Respiratory:  Negative for cough.    Cardiovascular:  Negative for chest pain.   Gastrointestinal:  Negative for abdominal pain.   Genitourinary:  Negative for difficulty urinating.   Musculoskeletal:  Negative for arthralgias and back pain.   Neurological:  Negative for dizziness.   Psychiatric/Behavioral:  Negative for agitation.            OBJECTIVE:     Vitals:    22 1401   BP: 133/81   Pulse: 72   Resp: 17         General Appearance: Alert, cooperative, no distress  Head: Normocephalic  Eyes: Clear conjunctiva  Neck: No obvious LND or JVD  Lungs: Normal chest excursion, no accessory muscle use  Chest: Regular rate rhythm by palpation, no pedal edema  Abdomen: Soft, non-tender, non-distended, surgical scars none, hernias none  Male Genitalia: circ phallus, no inguinal hernias, b/l testis normal shape and size w/o masses, no varicoceles   SHREE -- 30g, no nodules  Extremities: Atraumatic   Lymph Nodes: No appreciable lymph adenopathy  Neurologic: No gross gait, motor or sensory deficits            LAB:      CMP  Sodium   Date Value Ref Range Status   10/14/2022 136 136 - 145 mmol/L Final     Potassium   Date Value Ref Range Status   10/14/2022 4.3 3.5 - 5.1 mmol/L Final     Chloride   Date Value Ref Range Status   10/14/2022 102 95 - 110 mmol/L Final     CO2   Date Value Ref Range Status   10/14/2022 23 23 - 29 mmol/L Final     Glucose   Date Value Ref Range Status    10/14/2022 81 70 - 110 mg/dL Final     BUN   Date Value Ref Range Status   10/14/2022 11 8 - 23 mg/dL Final     Creatinine   Date Value Ref Range Status   10/14/2022 0.9 0.5 - 1.4 mg/dL Final     Calcium   Date Value Ref Range Status   10/14/2022 9.5 8.7 - 10.5 mg/dL Final     Total Protein   Date Value Ref Range Status   10/14/2022 8.0 6.0 - 8.4 g/dL Final     Albumin   Date Value Ref Range Status   10/14/2022 3.8 3.5 - 5.2 g/dL Final     Total Bilirubin   Date Value Ref Range Status   10/14/2022 0.7 0.1 - 1.0 mg/dL Final     Comment:     For infants and newborns, interpretation of results should be based  on gestational age, weight and in agreement with clinical  observations.    Premature Infant recommended reference ranges:  Up to 24 hours.............<8.0 mg/dL  Up to 48 hours............<12.0 mg/dL  3-5 days..................<15.0 mg/dL  6-29 days.................<15.0 mg/dL       Alkaline Phosphatase   Date Value Ref Range Status   10/14/2022 60 55 - 135 U/L Final     AST   Date Value Ref Range Status   10/14/2022 23 10 - 40 U/L Final     Comment:     *Result may be interfered by visible hemolysis     ALT   Date Value Ref Range Status   10/14/2022 17 10 - 44 U/L Final     Anion Gap   Date Value Ref Range Status   10/14/2022 11 8 - 16 mmol/L Final     eGFR   Date Value Ref Range Status   10/14/2022 >60.0 >60 mL/min/1.73 m^2 Final           IMAGING:          Results for orders placed or performed during the hospital encounter of 11/01/22 (from the past 2160 hour(s))   CT Chest Abdomen Pelvis With Contrast (xpd)    Impression    Left midpole heterogeneously enhancing left renal lesion, concerning for RCC or oncocytoma.  Recommend urology consultation and perhaps further evaluation with CT abdomen renal mass protocol.    Significant retained ingested contents with distended esophagus associated with distal esophageal wall thickening, could represent achalasia/motility disorder though malignancy not excluded.   Recommend GI consultation.    0.6 cm right upper lobe pulmonary nodules associated with adjacent nodules.  Recommend follow-up with noncontrast CT chest at 3-6 months.    Patchy irregular parenchymal opacities left lung, developing infiltrates not excluded.  This may be related to aspiration.    Nonobstructive right nephrolithiasis.  Additional renal hypodensities as above.    Prostatomegaly.    This report was flagged in Epic as abnormal.    Electronically signed by resident: Lindsey Alberto  Date:    11/02/2022  Time:    10:39    Electronically signed by: Grover Rosas MD  Date:    11/02/2022  Time:    12:14           ASSESSMENT/PLAN:     73 yo M with incidental finding of left renal mass and elevated PSA comes for evaluation.     Plan:    Elevated PSA  Today I informed the patient of incidence, risk factors, screening guidelines, and natural history of prostate cancer. Furthermore, we discussed the inaccuracies of PSA testing, rise due to size of the prostate gland or cancer, and the role of additional evaluation with various blood tests or prostate MRI that can be more informative in distinguishing elevated PSA attributed to benign prostate hypertrophy vs cancer.      I told him that he is an average an approximately 20% incidence of any prostate cancer at biopsy and an approximately 10% of clinically significant prostate cancer.      To this end we have decided to order the patient a PHI. He has already had an MRI that was negative for any PIRADS 4 or 5 lesions with a PSAD of 0.15.      We discussed that the mpMRI can survey the whole gland to search for abnormalities suggestive of prostate cancer and in some select cases can be used to avoid biopsy and in other cases can enhance the traditional biopsies methods by helping us perform a targeted biopsy.     I told him that once his PHI test results return we will discuss the need for biopsy.     At this time he would like to avoid biopsy if his PHI and %free  PSA come back low risk.    I did tell him that he needs close serial PSA monitoring every 6 months to ensure PSA isnt rising for a year then he can move back to yearly PSAs. For this and for biopsy he would like to see someone in [place] which I think is fine. I will send him to Dr. Kaplan.     In brief, I told him about how a prostate biopsy is performed per rectum with ultrasound guidance. I informed him of the risks of the procedure such as infection , blood per urine, blood per rectum , acute urinary retention , and acute loss of erectile function.      Left Renal Mass    The patient presents today for discussion and management of a left renal mass. Given the size of the mass and its appearance on imaging, we estimate its probability of being malignant to be 50%.     We discussed the role of additional studies to give further resolution as to the nature of this mass.  One approach would be renal mass biopsy.  This would be done with image guidance by our radiologist and would hopefully give direct pathologic assessment of the mass.  The risk of tumor seeding is thought to be low from this with modern techniques.  However there is an ~10 to 15% chance of a nondiagnostic biopsy, particularly in the setting of small renal masses.  There are also procedural risks associated with the biopsy.  The decision to proceed with a biopsy would be relevant in the case where a patient is not comfortable with active surveillance without knowing the pathology of the mass.     For equivocal small renal masses, there is some support relatively recently for the use of sestamibi scans in evaluating small renal masses, which are specialized scans that are specific for oncocytoma and hybrid oncocytoma/chromophobe renal masses.  These are classically benign/indolent renal masses with minimal malignant potential.  The scans are reported in the literature to be greater than 95% specific.  While the scans can be useful to identify  certain benign tumors, there are other benign tumors which we may not detect using sestamibi, and there are false positive scans.  Moreover, interpretation of a sestamibi scan is not always straightforward.  I would consider this diagnostic approach an evolving modality.     We discussed that in general there are three broad management options for small renal masses:         1) Active surveillance     Active surveillance implies no treatment for curative intent but the use of careful surveillance to monitor the lesion for growth.  The probability of metastasis developing while on surveillance of a small renal lesion is felt to be very low (1-3%), but is not impossible. The average growth rate for small renal tumors is 2-4 mm per year. We generally recommend imaging annually or every other year while on surveillance.  In some instances we consider obtaining a tumor biopsy prior to performing active surveillance, though this is not a universal practice.     I think this would be a good option for him.     2) Percutaneous ablation      Percutaneous ablation involves the placement of a needle within the lesion and the use of radiofrequency heating or cryoablation cooling to destroy the tumor.  Generally a biopsy of the tumor is obtained at the time of ablation to document histology.  At [facility] our preference is generally in favor of cryoablation because we can image the ice ball forming better and because efficacy seems better with larger tumors.  Retrospective studies showed that cryoablation is only slightly less efficacious than surgery, but has the advantage of less treatment related morbidity.  In most cases the procedure can be performed as an outpatient.  We explained that this is done by an interventional radiologist at our institution and that post-procedure imaging follow-up is organized by the urology service and continues for about 5 years to ensure the absence of a tumor recurrence.      He would like to  consider this option if his mass is growing.    3) Surgical removal      Surgery is another way to treat small renal masses.  In general, two forms of surgery are possible, radical nephrectomy and partial nephrectomy.  For most small renal masses, we recommend partial nephrectomy because it allows preservation of renal function from the affected kidney.  However, in patients with kidneys that are poorly functional, in tumors that are invading critical structures, or in situations where the kidney tumor is located at an anatomical site within the kidney that makes renal reconstruction impossible, then radical nephrectomy may be required.  We discussed that surgery could be performed using an open technique or minimally invasive technique which could be either laparoscopic or robotic.  The choice of each technique depends on the patient's prior surgical history, body habitus, and location and size of the tumor. The patient is a good candidate for a robotic partial nephrectomy. The pros and cons of these different techniques were discussed.  I explained that, occasionally, we may have to place a ureteral stent and maintain a urethral catheter to optimize urine drainage away from the site of reconstruction depending on the tumor location. The perioperative details of surgery and the postoperative recovery were discussed.  Potential risks of surgery were discussed, which include but are not limited to risks of anesthesia, bleeding, infection, adjacent organ injury, renal dysfunction, urine leak, medical complications of surgery, and death (although this is rare in modern partial nephrectomy).         The patient asked questions and all of them were answered.  After this discussion the patient  would like to undergo surveillance and repeat imaging with CT renal mass protocol in 6 months.  After which point if mass is stable in size would consider renal US surveillance yearly. For this the patient would also like to  continue surveillance scans in [place].    The total time for the new patient visit was at least 60 minutes in both face-to-face and non-face-to-face activities, which included chart review, interpretation of results, counseling, education, ordering meds/tests/procedures, and/or coordination of care.      Esteban Lucas MD  Urologic Oncology  P: [phone]

## 2022-11-21 ENCOUNTER — LAB VISIT (OUTPATIENT)
Dept: LAB | Facility: HOSPITAL | Age: 72
End: 2022-11-21
Attending: STUDENT IN AN ORGANIZED HEALTH CARE EDUCATION/TRAINING PROGRAM
Payer: MEDICARE

## 2022-11-21 ENCOUNTER — OFFICE VISIT (OUTPATIENT)
Dept: UROLOGY | Facility: CLINIC | Age: 72
End: 2022-11-21
Payer: MEDICARE

## 2022-11-21 VITALS
HEIGHT: 72 IN | WEIGHT: 138 LBS | RESPIRATION RATE: 17 BRPM | HEART RATE: 72 BPM | DIASTOLIC BLOOD PRESSURE: 81 MMHG | BODY MASS INDEX: 18.69 KG/M2 | SYSTOLIC BLOOD PRESSURE: 133 MMHG

## 2022-11-21 DIAGNOSIS — N28.89 LEFT RENAL MASS: ICD-10-CM

## 2022-11-21 DIAGNOSIS — R97.20 ELEVATED PSA: Primary | ICD-10-CM

## 2022-11-21 DIAGNOSIS — R97.20 ELEVATED PSA: ICD-10-CM

## 2022-11-21 LAB — COMPLEXED PSA SERPL-MCNC: 8.2 NG/ML (ref 0–4)

## 2022-11-21 PROCEDURE — 3288F FALL RISK ASSESSMENT DOCD: CPT | Mod: HCNC,CPTII,S$GLB, | Performed by: STUDENT IN AN ORGANIZED HEALTH CARE EDUCATION/TRAINING PROGRAM

## 2022-11-21 PROCEDURE — 99999 PR PBB SHADOW E&M-EST. PATIENT-LVL IV: ICD-10-PCS | Mod: PBBFAC,HCNC,, | Performed by: STUDENT IN AN ORGANIZED HEALTH CARE EDUCATION/TRAINING PROGRAM

## 2022-11-21 PROCEDURE — 1126F AMNT PAIN NOTED NONE PRSNT: CPT | Mod: HCNC,CPTII,S$GLB, | Performed by: STUDENT IN AN ORGANIZED HEALTH CARE EDUCATION/TRAINING PROGRAM

## 2022-11-21 PROCEDURE — 3008F PR BODY MASS INDEX (BMI) DOCUMENTED: ICD-10-PCS | Mod: HCNC,CPTII,S$GLB, | Performed by: STUDENT IN AN ORGANIZED HEALTH CARE EDUCATION/TRAINING PROGRAM

## 2022-11-21 PROCEDURE — 1159F PR MEDICATION LIST DOCUMENTED IN MEDICAL RECORD: ICD-10-PCS | Mod: HCNC,CPTII,S$GLB, | Performed by: STUDENT IN AN ORGANIZED HEALTH CARE EDUCATION/TRAINING PROGRAM

## 2022-11-21 PROCEDURE — 3075F SYST BP GE 130 - 139MM HG: CPT | Mod: HCNC,CPTII,S$GLB, | Performed by: STUDENT IN AN ORGANIZED HEALTH CARE EDUCATION/TRAINING PROGRAM

## 2022-11-21 PROCEDURE — 3008F BODY MASS INDEX DOCD: CPT | Mod: HCNC,CPTII,S$GLB, | Performed by: STUDENT IN AN ORGANIZED HEALTH CARE EDUCATION/TRAINING PROGRAM

## 2022-11-21 PROCEDURE — 3288F PR FALLS RISK ASSESSMENT DOCUMENTED: ICD-10-PCS | Mod: HCNC,CPTII,S$GLB, | Performed by: STUDENT IN AN ORGANIZED HEALTH CARE EDUCATION/TRAINING PROGRAM

## 2022-11-21 PROCEDURE — 99205 PR OFFICE/OUTPT VISIT, NEW, LEVL V, 60-74 MIN: ICD-10-PCS | Mod: HCNC,S$GLB,, | Performed by: STUDENT IN AN ORGANIZED HEALTH CARE EDUCATION/TRAINING PROGRAM

## 2022-11-21 PROCEDURE — 1159F MED LIST DOCD IN RCRD: CPT | Mod: HCNC,CPTII,S$GLB, | Performed by: STUDENT IN AN ORGANIZED HEALTH CARE EDUCATION/TRAINING PROGRAM

## 2022-11-21 PROCEDURE — 99205 OFFICE O/P NEW HI 60 MIN: CPT | Mod: HCNC,S$GLB,, | Performed by: STUDENT IN AN ORGANIZED HEALTH CARE EDUCATION/TRAINING PROGRAM

## 2022-11-21 PROCEDURE — 1101F PR PT FALLS ASSESS DOC 0-1 FALLS W/OUT INJ PAST YR: ICD-10-PCS | Mod: HCNC,CPTII,S$GLB, | Performed by: STUDENT IN AN ORGANIZED HEALTH CARE EDUCATION/TRAINING PROGRAM

## 2022-11-21 PROCEDURE — 1126F PR PAIN SEVERITY QUANTIFIED, NO PAIN PRESENT: ICD-10-PCS | Mod: HCNC,CPTII,S$GLB, | Performed by: STUDENT IN AN ORGANIZED HEALTH CARE EDUCATION/TRAINING PROGRAM

## 2022-11-21 PROCEDURE — 84153 ASSAY OF PSA TOTAL: CPT | Mod: 91,HCNC | Performed by: STUDENT IN AN ORGANIZED HEALTH CARE EDUCATION/TRAINING PROGRAM

## 2022-11-21 PROCEDURE — 3079F DIAST BP 80-89 MM HG: CPT | Mod: HCNC,CPTII,S$GLB, | Performed by: STUDENT IN AN ORGANIZED HEALTH CARE EDUCATION/TRAINING PROGRAM

## 2022-11-21 PROCEDURE — 99999 PR PBB SHADOW E&M-EST. PATIENT-LVL IV: CPT | Mod: PBBFAC,HCNC,, | Performed by: STUDENT IN AN ORGANIZED HEALTH CARE EDUCATION/TRAINING PROGRAM

## 2022-11-21 PROCEDURE — 3075F PR MOST RECENT SYSTOLIC BLOOD PRESS GE 130-139MM HG: ICD-10-PCS | Mod: HCNC,CPTII,S$GLB, | Performed by: STUDENT IN AN ORGANIZED HEALTH CARE EDUCATION/TRAINING PROGRAM

## 2022-11-21 PROCEDURE — 1101F PT FALLS ASSESS-DOCD LE1/YR: CPT | Mod: HCNC,CPTII,S$GLB, | Performed by: STUDENT IN AN ORGANIZED HEALTH CARE EDUCATION/TRAINING PROGRAM

## 2022-11-21 PROCEDURE — 36415 COLL VENOUS BLD VENIPUNCTURE: CPT | Mod: HCNC | Performed by: STUDENT IN AN ORGANIZED HEALTH CARE EDUCATION/TRAINING PROGRAM

## 2022-11-21 PROCEDURE — 3079F PR MOST RECENT DIASTOLIC BLOOD PRESSURE 80-89 MM HG: ICD-10-PCS | Mod: HCNC,CPTII,S$GLB, | Performed by: STUDENT IN AN ORGANIZED HEALTH CARE EDUCATION/TRAINING PROGRAM

## 2022-11-21 NOTE — LETTER
November 22, 2022        Sandro Kaplan MD  [address] [facility] [place] [address]             [facility] - Urology 2nd Fl  [address]  [facility] [address]  Phone: [phone]   Patient: Jose Young   MR Number: [id number]   YOB: 1950   Date of Visit: 11/21/2022     Dear Dr. Kaplan,     I saw Mr. Young in clinic for an elevated PSA and concern for a solid left renal mass that was approximately 1.5 cm in size on a CT scan with contrast.    For his elevated PSA the patient would like to avoid a prostate biopsy and to this end I informed him of the risks of avoiding prostate biopsy but also told him that he is low risk for the diagnosis of clinically significant prostate cancer based on the fact that he has a low PSA density and a negative MRI.  I have ordered him a PHI test which I will follow-up on, but ultimately I think patient is appropriate to try and avoid prostate biopsy by undergoing serial PSA testing and then biopsy at a future date if need be.  For this I hope you can follow him in [place].      With respect to a small renal mass, I believe that surveillance is a good option with ablation in the future if patient needs based on growth of the mass.  I have ordered him a CT renal mass protocol to be performed at 6 months.  If the mass is the same size I think following him with renal ultrasound is safe and conversion to ablation if the mass begins to grow.  For this I hope that you can also follow him in [place].      Please feel free to contact me with any questions or concerns my cell phone number is [phone].    I have placed a referral to your service in Epic.        Sincerely,      Esteban Lucas MD            CC  No Recipients    Enclosure

## 2022-11-28 ENCOUNTER — TELEPHONE (OUTPATIENT)
Dept: SURGERY | Facility: CLINIC | Age: 72
End: 2022-11-28
Payer: MEDICARE

## 2022-11-28 ENCOUNTER — ANESTHESIA EVENT (OUTPATIENT)
Dept: SURGERY | Facility: HOSPITAL | Age: 72
DRG: 327 | End: 2022-11-28
Payer: MEDICARE

## 2022-11-28 NOTE — PRE-PROCEDURE INSTRUCTIONS
Preop instructions:      NPO instructions: NO solids foods,non-clear liquids including milk, milk products, creamers, gum, mints, or hard candy after midnight the night prior to your surgery or 8 hours prior to your SX start time.   We encourage a limited consumption of CLEAR LIQUIDS after midnight the night before your surgery up to 2 hrs prior to your SX start time.      Acceptable Clear Liquids are listed below:     Water, Gatorade/Powerade sports drinks, Apple juice (no pulp) Black coffee with without sugar/NO milk, cream or creamer or Jello.      If you have received specific instructions from your Surgeon/Surgeon's staff, please follow their instructions.      Showering instructions, directions, leave all valuables at home, medication instructions for PM prior & am of procedure explained. Patient stated an understanding.      Patient denies any side effects or issues with anesthesia or sedation.     ARRIVAL TIME TO [facility]

## 2022-11-29 ENCOUNTER — HOSPITAL ENCOUNTER (INPATIENT)
Facility: HOSPITAL | Age: 72
LOS: 3 days | Discharge: HOME HEALTH | DRG: 327 | End: 2022-12-04
Attending: SURGERY | Admitting: SURGERY
Payer: MEDICARE

## 2022-11-29 ENCOUNTER — ANESTHESIA (OUTPATIENT)
Dept: SURGERY | Facility: HOSPITAL | Age: 72
DRG: 327 | End: 2022-11-29
Payer: MEDICARE

## 2022-11-29 DIAGNOSIS — R07.9 CHEST PAIN: ICD-10-CM

## 2022-11-29 DIAGNOSIS — K22.0 ACHALASIA: Primary | ICD-10-CM

## 2022-11-29 PROCEDURE — 43497 PR MYOTOMY, LOWER ESOPH, TRANSORAL: ICD-10-PCS | Mod: 22,HCNC,, | Performed by: SURGERY

## 2022-11-29 PROCEDURE — 93010 ELECTROCARDIOGRAM REPORT: CPT | Mod: ,,, | Performed by: INTERNAL MEDICINE

## 2022-11-29 PROCEDURE — D9220A PRA ANESTHESIA: ICD-10-PCS | Mod: ANES,,, | Performed by: STUDENT IN AN ORGANIZED HEALTH CARE EDUCATION/TRAINING PROGRAM

## 2022-11-29 PROCEDURE — 25000003 PHARM REV CODE 250: Performed by: NURSE ANESTHETIST, CERTIFIED REGISTERED

## 2022-11-29 PROCEDURE — 36000706: Performed by: SURGERY

## 2022-11-29 PROCEDURE — 11000001 HC ACUTE MED/SURG PRIVATE ROOM

## 2022-11-29 PROCEDURE — 37000008 HC ANESTHESIA 1ST 15 MINUTES: Performed by: SURGERY

## 2022-11-29 PROCEDURE — 63600175 PHARM REV CODE 636 W HCPCS

## 2022-11-29 PROCEDURE — 25000003 PHARM REV CODE 250

## 2022-11-29 PROCEDURE — D9220A PRA ANESTHESIA: Mod: CRNA,,, | Performed by: NURSE ANESTHETIST, CERTIFIED REGISTERED

## 2022-11-29 PROCEDURE — 63600175 PHARM REV CODE 636 W HCPCS: Mod: JG | Performed by: SURGERY

## 2022-11-29 PROCEDURE — C9113 INJ PANTOPRAZOLE SODIUM, VIA: HCPCS | Performed by: SURGERY

## 2022-11-29 PROCEDURE — 37000009 HC ANESTHESIA EA ADD 15 MINS: Performed by: SURGERY

## 2022-11-29 PROCEDURE — 94761 N-INVAS EAR/PLS OXIMETRY MLT: CPT

## 2022-11-29 PROCEDURE — 63600175 PHARM REV CODE 636 W HCPCS: Performed by: SURGERY

## 2022-11-29 PROCEDURE — 93005 ELECTROCARDIOGRAM TRACING: CPT

## 2022-11-29 PROCEDURE — 36000707: Performed by: SURGERY

## 2022-11-29 PROCEDURE — 71000015 HC POSTOP RECOV 1ST HR: Performed by: SURGERY

## 2022-11-29 PROCEDURE — 43497 TRANSORL LWR ESOPHGL MYOTOMY: CPT | Mod: 22,HCNC,, | Performed by: SURGERY

## 2022-11-29 PROCEDURE — 71000033 HC RECOVERY, INTIAL HOUR: Performed by: SURGERY

## 2022-11-29 PROCEDURE — 93010 EKG 12-LEAD: ICD-10-PCS | Mod: ,,, | Performed by: INTERNAL MEDICINE

## 2022-11-29 PROCEDURE — 25000003 PHARM REV CODE 250: Performed by: SURGERY

## 2022-11-29 PROCEDURE — 71000016 HC POSTOP RECOV ADDL HR: Performed by: SURGERY

## 2022-11-29 PROCEDURE — 63600175 PHARM REV CODE 636 W HCPCS: Performed by: NURSE ANESTHETIST, CERTIFIED REGISTERED

## 2022-11-29 PROCEDURE — D9220A PRA ANESTHESIA: Mod: ANES,,, | Performed by: STUDENT IN AN ORGANIZED HEALTH CARE EDUCATION/TRAINING PROGRAM

## 2022-11-29 PROCEDURE — D9220A PRA ANESTHESIA: ICD-10-PCS | Mod: CRNA,,, | Performed by: NURSE ANESTHETIST, CERTIFIED REGISTERED

## 2022-11-29 RX ORDER — ROCURONIUM BROMIDE 10 MG/ML
INJECTION, SOLUTION INTRAVENOUS
Status: DISCONTINUED | OUTPATIENT
Start: 2022-11-29 | End: 2022-11-29

## 2022-11-29 RX ORDER — LIDOCAINE HYDROCHLORIDE 20 MG/ML
INJECTION, SOLUTION EPIDURAL; INFILTRATION; INTRACAUDAL; PERINEURAL
Status: DISCONTINUED | OUTPATIENT
Start: 2022-11-29 | End: 2022-11-29

## 2022-11-29 RX ORDER — SODIUM CHLORIDE, SODIUM LACTATE, POTASSIUM CHLORIDE, CALCIUM CHLORIDE 600; 310; 30; 20 MG/100ML; MG/100ML; MG/100ML; MG/100ML
INJECTION, SOLUTION INTRAVENOUS CONTINUOUS
Status: DISCONTINUED | OUTPATIENT
Start: 2022-11-29 | End: 2022-12-04 | Stop reason: HOSPADM

## 2022-11-29 RX ORDER — SUCCINYLCHOLINE CHLORIDE 20 MG/ML
INJECTION INTRAMUSCULAR; INTRAVENOUS
Status: DISCONTINUED | OUTPATIENT
Start: 2022-11-29 | End: 2022-11-29

## 2022-11-29 RX ORDER — PROPOFOL 10 MG/ML
VIAL (ML) INTRAVENOUS
Status: DISCONTINUED | OUTPATIENT
Start: 2022-11-29 | End: 2022-11-29

## 2022-11-29 RX ORDER — HALOPERIDOL 5 MG/ML
0.5 INJECTION INTRAMUSCULAR EVERY 10 MIN PRN
Status: DISCONTINUED | OUTPATIENT
Start: 2022-11-29 | End: 2022-11-29 | Stop reason: HOSPADM

## 2022-11-29 RX ORDER — ACETAMINOPHEN 10 MG/ML
1000 INJECTION, SOLUTION INTRAVENOUS EVERY 8 HOURS
Status: DISPENSED | OUTPATIENT
Start: 2022-11-29 | End: 2022-11-30

## 2022-11-29 RX ORDER — ONDANSETRON 2 MG/ML
INJECTION INTRAMUSCULAR; INTRAVENOUS
Status: DISCONTINUED | OUTPATIENT
Start: 2022-11-29 | End: 2022-11-29

## 2022-11-29 RX ORDER — CEFAZOLIN SODIUM/WATER 2 G/20 ML
2 SYRINGE (ML) INTRAVENOUS
Status: COMPLETED | OUTPATIENT
Start: 2022-11-29 | End: 2022-11-29

## 2022-11-29 RX ORDER — SODIUM CHLORIDE 9 MG/ML
INJECTION, SOLUTION INTRAVENOUS CONTINUOUS
Status: DISCONTINUED | OUTPATIENT
Start: 2022-11-29 | End: 2022-11-29

## 2022-11-29 RX ORDER — FENTANYL CITRATE 50 UG/ML
INJECTION, SOLUTION INTRAMUSCULAR; INTRAVENOUS
Status: DISCONTINUED | OUTPATIENT
Start: 2022-11-29 | End: 2022-11-29

## 2022-11-29 RX ORDER — PANTOPRAZOLE SODIUM 40 MG/10ML
40 INJECTION, POWDER, LYOPHILIZED, FOR SOLUTION INTRAVENOUS 2 TIMES DAILY
Status: DISCONTINUED | OUTPATIENT
Start: 2022-11-29 | End: 2022-12-04 | Stop reason: HOSPADM

## 2022-11-29 RX ORDER — MORPHINE SULFATE 1 MG/ML
INJECTION INTRAVENOUS CONTINUOUS
Status: DISCONTINUED | OUTPATIENT
Start: 2022-11-29 | End: 2022-11-30

## 2022-11-29 RX ORDER — SODIUM CHLORIDE 0.9 % (FLUSH) 0.9 %
10 SYRINGE (ML) INJECTION
Status: DISCONTINUED | OUTPATIENT
Start: 2022-11-29 | End: 2022-11-29 | Stop reason: HOSPADM

## 2022-11-29 RX ORDER — HYDROMORPHONE HYDROCHLORIDE 1 MG/ML
0.2 INJECTION, SOLUTION INTRAMUSCULAR; INTRAVENOUS; SUBCUTANEOUS EVERY 5 MIN PRN
Status: DISCONTINUED | OUTPATIENT
Start: 2022-11-29 | End: 2022-11-29 | Stop reason: HOSPADM

## 2022-11-29 RX ORDER — METHYLENE BLUE 5 MG/ML
INJECTION INTRAVENOUS
Status: DISCONTINUED | OUTPATIENT
Start: 2022-11-29 | End: 2022-11-29 | Stop reason: HOSPADM

## 2022-11-29 RX ORDER — CEFAZOLIN SODIUM 1 G/50ML
1 SOLUTION INTRAVENOUS
Status: COMPLETED | OUTPATIENT
Start: 2022-11-29 | End: 2022-11-30

## 2022-11-29 RX ORDER — PHENYLEPHRINE HCL IN 0.9% NACL 1 MG/10 ML
SYRINGE (ML) INTRAVENOUS
Status: DISCONTINUED | OUTPATIENT
Start: 2022-11-29 | End: 2022-11-29

## 2022-11-29 RX ORDER — DEXMEDETOMIDINE HYDROCHLORIDE 100 UG/ML
INJECTION, SOLUTION INTRAVENOUS
Status: DISCONTINUED | OUTPATIENT
Start: 2022-11-29 | End: 2022-11-29

## 2022-11-29 RX ORDER — DEXAMETHASONE SODIUM PHOSPHATE 4 MG/ML
INJECTION, SOLUTION INTRA-ARTICULAR; INTRALESIONAL; INTRAMUSCULAR; INTRAVENOUS; SOFT TISSUE
Status: DISCONTINUED | OUTPATIENT
Start: 2022-11-29 | End: 2022-11-29

## 2022-11-29 RX ORDER — ACETAMINOPHEN 10 MG/ML
INJECTION, SOLUTION INTRAVENOUS
Status: DISCONTINUED | OUTPATIENT
Start: 2022-11-29 | End: 2022-11-29

## 2022-11-29 RX ADMIN — ROCURONIUM BROMIDE 20 MG: 10 INJECTION, SOLUTION INTRAVENOUS at 01:11

## 2022-11-29 RX ADMIN — HYDROMORPHONE HYDROCHLORIDE 0.2 MG: 1 INJECTION, SOLUTION INTRAMUSCULAR; INTRAVENOUS; SUBCUTANEOUS at 06:11

## 2022-11-29 RX ADMIN — ROCURONIUM BROMIDE 40 MG: 10 INJECTION, SOLUTION INTRAVENOUS at 12:11

## 2022-11-29 RX ADMIN — SODIUM CHLORIDE: 0.9 INJECTION, SOLUTION INTRAVENOUS at 11:11

## 2022-11-29 RX ADMIN — SUCCINYLCHOLINE CHLORIDE 200 MG: 20 INJECTION, SOLUTION INTRAMUSCULAR; INTRAVENOUS; PARENTERAL at 11:11

## 2022-11-29 RX ADMIN — LIDOCAINE HYDROCHLORIDE 60 MG: 20 INJECTION, SOLUTION EPIDURAL; INFILTRATION; INTRACAUDAL; PERINEURAL at 11:11

## 2022-11-29 RX ADMIN — ROCURONIUM BROMIDE 10 MG: 10 INJECTION, SOLUTION INTRAVENOUS at 11:11

## 2022-11-29 RX ADMIN — CEFAZOLIN SODIUM 1 G: 1 SOLUTION INTRAVENOUS at 10:11

## 2022-11-29 RX ADMIN — Medication 100 MCG: at 12:11

## 2022-11-29 RX ADMIN — SODIUM CHLORIDE, POTASSIUM CHLORIDE, SODIUM LACTATE AND CALCIUM CHLORIDE: 600; 310; 30; 20 INJECTION, SOLUTION INTRAVENOUS at 05:11

## 2022-11-29 RX ADMIN — Medication: at 06:11

## 2022-11-29 RX ADMIN — ACETAMINOPHEN 1000 MG: 10 INJECTION, SOLUTION INTRAVENOUS at 02:11

## 2022-11-29 RX ADMIN — DEXMEDETOMIDINE HYDROCHLORIDE 8 MCG: 100 INJECTION, SOLUTION INTRAVENOUS at 02:11

## 2022-11-29 RX ADMIN — Medication 200 MCG: at 01:11

## 2022-11-29 RX ADMIN — Medication 2 G: at 12:11

## 2022-11-29 RX ADMIN — SUGAMMADEX 200 MG: 100 INJECTION, SOLUTION INTRAVENOUS at 03:11

## 2022-11-29 RX ADMIN — PANTOPRAZOLE SODIUM 40 MG: 40 INJECTION, POWDER, FOR SOLUTION INTRAVENOUS at 09:11

## 2022-11-29 RX ADMIN — PROPOFOL 200 MG: 10 INJECTION, EMULSION INTRAVENOUS at 11:11

## 2022-11-29 RX ADMIN — ROCURONIUM BROMIDE 20 MG: 10 INJECTION, SOLUTION INTRAVENOUS at 12:11

## 2022-11-29 RX ADMIN — HYDROMORPHONE HYDROCHLORIDE 0.2 MG: 1 INJECTION, SOLUTION INTRAMUSCULAR; INTRAVENOUS; SUBCUTANEOUS at 05:11

## 2022-11-29 RX ADMIN — Medication 200 MCG: at 12:11

## 2022-11-29 RX ADMIN — ACETAMINOPHEN 1000 MG: 10 INJECTION INTRAVENOUS at 09:11

## 2022-11-29 RX ADMIN — Medication 100 MCG: at 03:11

## 2022-11-29 RX ADMIN — FENTANYL CITRATE 100 MCG: 50 INJECTION INTRAMUSCULAR; INTRAVENOUS at 11:11

## 2022-11-29 RX ADMIN — ROCURONIUM BROMIDE 10 MG: 10 INJECTION, SOLUTION INTRAVENOUS at 03:11

## 2022-11-29 RX ADMIN — ONDANSETRON 4 MG: 2 INJECTION INTRAMUSCULAR; INTRAVENOUS at 03:11

## 2022-11-29 RX ADMIN — SODIUM CHLORIDE, SODIUM GLUCONATE, SODIUM ACETATE, POTASSIUM CHLORIDE, MAGNESIUM CHLORIDE, SODIUM PHOSPHATE, DIBASIC, AND POTASSIUM PHOSPHATE: .53; .5; .37; .037; .03; .012; .00082 INJECTION, SOLUTION INTRAVENOUS at 12:11

## 2022-11-29 RX ADMIN — DEXAMETHASONE SODIUM PHOSPHATE 4 MG: 4 INJECTION INTRA-ARTICULAR; INTRALESIONAL; INTRAMUSCULAR; INTRAVENOUS; SOFT TISSUE at 12:11

## 2022-11-29 RX ADMIN — ROCURONIUM BROMIDE 20 MG: 10 INJECTION, SOLUTION INTRAVENOUS at 02:11

## 2022-11-29 RX ADMIN — DEXMEDETOMIDINE HYDROCHLORIDE 16 MCG: 100 INJECTION, SOLUTION INTRAVENOUS at 04:11

## 2022-11-29 NOTE — ANESTHESIA PREPROCEDURE EVALUATION
Pre-operative evaluation for Procedure(s) (LRB):  MYOTOMY, PERORAL, ENDOSCOPIC POEM (N/A)    Jose Young is a 72 y.o. male with HTN, HLD, and achalasia with daily dysphagia (solids>liquids), daily regurgitation, and overall 50lb weight loss. Pt is here for the above mentioned procedures.       Patient Active Problem List   Diagnosis    Cigar smoker motivated to quit    Elevated PSA, less than 10 ng/ml    Hyperlipidemia, mixed    Hypertension, essential    Achalasia    Left renal mass       Review of patient's allergies indicates:  No Known Allergies     No current facility-administered medications on file prior to encounter.     Current Outpatient Medications on File Prior to Encounter   Medication Sig Dispense Refill    ascorbic acid, vitamin C, (VITAMIN C) 250 MG tablet Take 250 mg by mouth once daily.      pantoprazole (PROTONIX) 40 MG tablet Take 1 tablet (40 mg total) by mouth once daily. 30 minutes before any food. 30 tablet 1    atorvastatin (LIPITOR) 40 MG tablet TAKE 1 TABLET(40 MG) BY MOUTH EVERY DAY 30 tablet 0    b complex vitamins tablet Take 1 tablet by mouth once daily.      cyproheptadine (PERIACTIN) 4 mg tablet Take 4 mg by mouth 3 (three) times daily. Pt needs refill      erythromycin (ROMYCIN) ophthalmic ointment APPLY A 3 MM RIBBON TO THE AFFECTED EYE AT NIGHT FOR 7 DAYS      hydroCHLOROthiazide (MICROZIDE) 12.5 mg capsule TAKE 1 CAPSULE(12.5 MG) BY MOUTH EVERY DAY (Patient not taking: Reported on 11/16/2022) 30 capsule 0    ofloxacin (OCUFLOX) 0.3 % ophthalmic solution INSTILL 1 DROP INTO THE RIGHT EYE FOUR TIMES DAILY FOR 7 DAYS      vitamin D (VITAMIN D3) 1000 units Tab Take 1,000 Units by mouth once daily.         Past Surgical History:   Procedure Laterality Date    COLONOSCOPY N/A 10/26/2022    Procedure: COLONOSCOPY;  Surgeon: Denver Ramos MD;  Location: [facility];  Service:  Endoscopy;  Laterality: N/A;    ESOPHAGEAL MANOMETRY WITH MEASUREMENT OF IMPEDANCE N/A 2022    Procedure: MANOMETRY, ESOPHAGUS, WITH IMPEDANCE MEASUREMENT;  Surgeon: Delores Lomeli MD;  [facility]  Service: Endoscopy;  Laterality: N/A;  instructions sent to [facility]    ESOPHAGOGASTRODUODENOSCOPY N/A 10/26/2022    Procedure: EGD (ESOPHAGOGASTRODUODENOSCOPY);  Surgeon: Denver Ramos MD;  Location: [facility];  Service: Endoscopy;  Laterality: N/A;  Medically Urgent  10/17 fully vaccinated; instructions to [place]-       Social History     Socioeconomic History    Marital status: Single   Tobacco Use    Smoking status: Former     Types: Cigars, Cigarettes     Quit date: 2022     Years since quittin.3    Smokeless tobacco: Never    Tobacco comments:     Currently uses a non-nicotine vape pen   Substance and Sexual Activity    Alcohol use: Yes     Alcohol/week: 1.0 standard drink     Types: 1 Cans of beer per week    Drug use: Not Currently     Types: Marijuana    Sexual activity: Not Currently         Vital Signs Range (Last 24H):  Temp:  [37.1 °C (98.7 °F)]   Pulse:  [67]   Resp:  [18]   BP: (120)/(62)   SpO2:  [100 %]       CBC: No results for input(s): WBC, RBC, HGB, HCT, PLT, MCV, MCH, MCHC in the last 72 hours.    CMP: No results for input(s): NA, K, CL, CO2, BUN, CREATININE, GLU, MG, PHOS, CALCIUM, ALBUMIN, PROT, ALKPHOS, ALT, AST, BILITOT in the last 72 hours.    INR  No results for input(s): PT, INR, PROTIME, APTT in the last 72 hours.        Diagnostic Studies:    EKG: reviewed. NSR    EGD 10/26/22: Mild resistance at the GEJ, fluid in the esophagus, Savory dilation with 45Fr, normal stomach and duodenum.     CT 22: Distended esophagus with distal esophageal wall thickening with significant regained ingested contents.     HREM 22: LES basal 38.3 -> IRP 31.4 mmHg, 100% failed swallows, 100% PEP, supragastric belching noted, consistent with type II  achalasia     UGI 11/14/22: Prolonged holdup of 13 mm barium tablet at the distal esophagus to the GE junction which did not pass through GE junction into the stomach, marked dilatation and minimal contractility of the entire esophagus, minimal passage of contrast through the GE junction into the stomach, concerning for achalasia.          Pre-op Assessment    I have reviewed the Patient Summary Reports.     I have reviewed the Nursing Notes. I have reviewed the NPO Status.   I have reviewed the Medications.     Review of Systems  Anesthesia Hx:  No problems with previous Anesthesia  History of prior surgery of interest to airway management or planning: Denies Family Hx of Anesthesia complications.   Denies Personal Hx of Anesthesia complications.   Social:  No Alcohol Use, Smoker    Hematology/Oncology:  Hematology Normal   Oncology Normal     EENT/Dental:EENT/Dental Normal   Cardiovascular:   Hypertension    Pulmonary:  Pulmonary Normal    Renal/:  Renal/ Normal     Hepatic/GI:  Hepatic/GI Normal achalasia   Musculoskeletal:  Musculoskeletal Normal    Neurological:  Neurology Normal    Endocrine:  Endocrine Normal    Dermatological:  Skin Normal    Psych:  Psychiatric Normal           Physical Exam  General: Well nourished, Cooperative, Alert and Oriented    Airway:  Mallampati: I   Mouth Opening: Normal  TM Distance: Normal  Tongue: Normal  Neck ROM: Normal ROM    Dental:  Dentures        Anesthesia Plan  Type of Anesthesia, risks & benefits discussed:    Anesthesia Type: Gen ETT  Intra-op Monitoring Plan: Standard ASA Monitors  Post Op Pain Control Plan: multimodal analgesia  Induction:  IV  Airway Plan: Direct, Post-Induction  Informed Consent: Informed consent signed with the Patient and all parties understand the risks and agree with anesthesia plan.  All questions answered. Patient consented to blood products? No  ASA Score: 2  Day of Surgery Review of History & Physical: H&P Update referred to the  surgeon/provider.    Ready For Surgery From Anesthesia Perspective.     .

## 2022-11-29 NOTE — PROGRESS NOTES
Dr. Teran notified of patient's chest pain (5-6/10). He feels it is hard to take a deep breath due to pressure. Morphine PCA will be set up once pump delivered to PACU. In meantime, patient administered dilaudid IV per anesthesia order.

## 2022-11-29 NOTE — ANESTHESIA PROCEDURE NOTES
Intubation    Date/Time: 11/29/2022 12:00 PM  Performed by: Yosef Willis DO  Authorized by: Tatum Blank MD     Intubation:     Induction:  Rapid sequence induction    Intubated:  Postinduction    Mask Ventilation:  Not attempted    Attempts:  1    Attempted By:  Resident anesthesiologist    Method of Intubation:  Direct    Blade:  Sanders 2    Laryngeal View Grade: Grade I - full view of cords      Difficult Airway Encountered?: No      Complications:  Reflex of gastric contents - possible aspiration    Airway Device:  Oral endotracheal tube    Airway Device Size:  7.5    Style/Cuff Inflation:  Cuffed (inflated to minimal occlusive pressure)    Inflation Amount (mL):  7    Tube secured:  23    Secured at:  The lips    Placement Verified By:  Capnometry    Complicating Factors:  None    Findings Post-Intubation:  BS equal bilateral and atraumatic/condition of teeth unchanged  Notes:      Patient with reflux of gastric contents upon direct laryngoscopy. Gastric contents immediately suctioned and additional paralytic given. Laryngoscope reintroduced and patient intubated successfully. Diminished breath sounds bilaterally.

## 2022-11-29 NOTE — TRANSFER OF CARE
Anesthesia Transfer of Care Note    Patient: Jose Young    Procedure(s) Performed: Procedure(s) (LRB):  MYOTOMY, PERORAL, ENDOSCOPIC POEM (N/A)    Patient location: PACU    Anesthesia Type: general    Transport from OR: Transported from OR on 6-10 L/min O2 by face mask with adequate spontaneous ventilation    Post pain: adequate analgesia    Post assessment: no apparent anesthetic complications and tolerated procedure well    Post vital signs: stable    Level of consciousness: awake and alert    Nausea/Vomiting: no nausea/vomiting    Complications: none    Transfer of care protocol was followed      Last vitals:   Visit Vitals  /62 (BP Location: Left arm, Patient Position: Lying)   Pulse 67   Temp 37.1 °C (98.7 °F) (Oral)   Resp 18   Ht 6' (1.829 m)   Wt 60.3 kg (133 lb)   SpO2 100%   BMI 18.04 kg/m²

## 2022-11-30 ENCOUNTER — ANESTHESIA EVENT (OUTPATIENT)
Dept: SURGERY | Facility: HOSPITAL | Age: 72
DRG: 327 | End: 2022-11-30
Payer: MEDICARE

## 2022-11-30 ENCOUNTER — ANESTHESIA (OUTPATIENT)
Dept: SURGERY | Facility: HOSPITAL | Age: 72
DRG: 327 | End: 2022-11-30
Payer: MEDICARE

## 2022-11-30 LAB
ALBUMIN SERPL BCP-MCNC: 2.7 G/DL (ref 3.5–5.2)
ALP SERPL-CCNC: 49 U/L (ref 55–135)
ALT SERPL W/O P-5'-P-CCNC: 10 U/L (ref 10–44)
ANION GAP SERPL CALC-SCNC: 7 MMOL/L (ref 8–16)
AST SERPL-CCNC: 17 U/L (ref 10–40)
BASOPHILS # BLD AUTO: 0.03 K/UL (ref 0–0.2)
BASOPHILS NFR BLD: 0.2 % (ref 0–1.9)
BILIRUB SERPL-MCNC: 0.9 MG/DL (ref 0.1–1)
BUN SERPL-MCNC: 11 MG/DL (ref 8–23)
CALCIUM SERPL-MCNC: 8.6 MG/DL (ref 8.7–10.5)
CHLORIDE SERPL-SCNC: 104 MMOL/L (ref 95–110)
CO2 SERPL-SCNC: 22 MMOL/L (ref 23–29)
CREAT SERPL-MCNC: 0.8 MG/DL (ref 0.5–1.4)
DIFFERENTIAL METHOD: ABNORMAL
EOSINOPHIL # BLD AUTO: 0 K/UL (ref 0–0.5)
EOSINOPHIL NFR BLD: 0.2 % (ref 0–8)
ERYTHROCYTE [DISTWIDTH] IN BLOOD BY AUTOMATED COUNT: 14.3 % (ref 11.5–14.5)
EST. GFR  (NO RACE VARIABLE): >60 ML/MIN/1.73 M^2
GLUCOSE SERPL-MCNC: 94 MG/DL (ref 70–110)
HCT VFR BLD AUTO: 33 % (ref 40–54)
HGB BLD-MCNC: 11.3 G/DL (ref 14–18)
IMM GRANULOCYTES # BLD AUTO: 0.09 K/UL (ref 0–0.04)
IMM GRANULOCYTES NFR BLD AUTO: 0.7 % (ref 0–0.5)
LYMPHOCYTES # BLD AUTO: 1.8 K/UL (ref 1–4.8)
LYMPHOCYTES NFR BLD: 12.9 % (ref 18–48)
MAGNESIUM SERPL-MCNC: 1.6 MG/DL (ref 1.6–2.6)
MCH RBC QN AUTO: 30 PG (ref 27–31)
MCHC RBC AUTO-ENTMCNC: 34.2 G/DL (ref 32–36)
MCV RBC AUTO: 88 FL (ref 82–98)
MONOCYTES # BLD AUTO: 0.9 K/UL (ref 0.3–1)
MONOCYTES NFR BLD: 6.5 % (ref 4–15)
NEUTROPHILS # BLD AUTO: 10.8 K/UL (ref 1.8–7.7)
NEUTROPHILS NFR BLD: 79.5 % (ref 38–73)
NRBC BLD-RTO: 0 /100 WBC
PHOSPHATE SERPL-MCNC: 2.6 MG/DL (ref 2.7–4.5)
PLATELET # BLD AUTO: 215 K/UL (ref 150–450)
PMV BLD AUTO: 9.7 FL (ref 9.2–12.9)
POCT GLUCOSE: 102 MG/DL (ref 70–110)
POCT GLUCOSE: 109 MG/DL (ref 70–110)
POCT GLUCOSE: 89 MG/DL (ref 70–110)
POCT GLUCOSE: 95 MG/DL (ref 70–110)
POTASSIUM SERPL-SCNC: 4.2 MMOL/L (ref 3.5–5.1)
PROT SERPL-MCNC: 6 G/DL (ref 6–8.4)
RBC # BLD AUTO: 3.77 M/UL (ref 4.6–6.2)
SODIUM SERPL-SCNC: 133 MMOL/L (ref 136–145)
WBC # BLD AUTO: 13.61 K/UL (ref 3.9–12.7)

## 2022-11-30 PROCEDURE — 25000003 PHARM REV CODE 250: Performed by: NURSE ANESTHETIST, CERTIFIED REGISTERED

## 2022-11-30 PROCEDURE — 83735 ASSAY OF MAGNESIUM: CPT

## 2022-11-30 PROCEDURE — 25000003 PHARM REV CODE 250

## 2022-11-30 PROCEDURE — 43497 TRANSORL LWR ESOPHGL MYOTOMY: CPT | Mod: HCNC,52,, | Performed by: SURGERY

## 2022-11-30 PROCEDURE — 27201423 OPTIME MED/SURG SUP & DEVICES STERILE SUPPLY: Performed by: SURGERY

## 2022-11-30 PROCEDURE — 63600175 PHARM REV CODE 636 W HCPCS: Performed by: NURSE ANESTHETIST, CERTIFIED REGISTERED

## 2022-11-30 PROCEDURE — 85025 COMPLETE CBC W/AUTO DIFF WBC: CPT

## 2022-11-30 PROCEDURE — D9220A PRA ANESTHESIA: ICD-10-PCS | Mod: CRNA,,, | Performed by: NURSE ANESTHETIST, CERTIFIED REGISTERED

## 2022-11-30 PROCEDURE — 63600175 PHARM REV CODE 636 W HCPCS

## 2022-11-30 PROCEDURE — 36000707: Performed by: SURGERY

## 2022-11-30 PROCEDURE — 36000706: Performed by: SURGERY

## 2022-11-30 PROCEDURE — D9220A PRA ANESTHESIA: Mod: ANES,,, | Performed by: ANESTHESIOLOGY

## 2022-11-30 PROCEDURE — 37000008 HC ANESTHESIA 1ST 15 MINUTES: Performed by: SURGERY

## 2022-11-30 PROCEDURE — 25500020 PHARM REV CODE 255: Performed by: SURGERY

## 2022-11-30 PROCEDURE — 63600175 PHARM REV CODE 636 W HCPCS: Performed by: SURGERY

## 2022-11-30 PROCEDURE — 71000015 HC POSTOP RECOV 1ST HR: Performed by: SURGERY

## 2022-11-30 PROCEDURE — 80053 COMPREHEN METABOLIC PANEL: CPT

## 2022-11-30 PROCEDURE — D9220A PRA ANESTHESIA: Mod: CRNA,,, | Performed by: NURSE ANESTHETIST, CERTIFIED REGISTERED

## 2022-11-30 PROCEDURE — 36415 COLL VENOUS BLD VENIPUNCTURE: CPT

## 2022-11-30 PROCEDURE — 43497 PR MYOTOMY, LOWER ESOPH, TRANSORAL: ICD-10-PCS | Mod: HCNC,52,, | Performed by: SURGERY

## 2022-11-30 PROCEDURE — 37000009 HC ANESTHESIA EA ADD 15 MINS: Performed by: SURGERY

## 2022-11-30 PROCEDURE — 71000033 HC RECOVERY, INTIAL HOUR: Performed by: SURGERY

## 2022-11-30 PROCEDURE — C9113 INJ PANTOPRAZOLE SODIUM, VIA: HCPCS | Performed by: SURGERY

## 2022-11-30 PROCEDURE — D9220A PRA ANESTHESIA: ICD-10-PCS | Mod: ANES,,, | Performed by: ANESTHESIOLOGY

## 2022-11-30 PROCEDURE — 84100 ASSAY OF PHOSPHORUS: CPT

## 2022-11-30 RX ORDER — SUCCINYLCHOLINE CHLORIDE 20 MG/ML
INJECTION INTRAMUSCULAR; INTRAVENOUS
Status: DISCONTINUED | OUTPATIENT
Start: 2022-11-30 | End: 2022-11-30

## 2022-11-30 RX ORDER — ENOXAPARIN SODIUM 100 MG/ML
40 INJECTION SUBCUTANEOUS EVERY 24 HOURS
Status: DISCONTINUED | OUTPATIENT
Start: 2022-11-30 | End: 2022-12-04 | Stop reason: HOSPADM

## 2022-11-30 RX ORDER — DROPERIDOL 2.5 MG/ML
0.62 INJECTION, SOLUTION INTRAMUSCULAR; INTRAVENOUS ONCE AS NEEDED
Status: DISCONTINUED | OUTPATIENT
Start: 2022-11-30 | End: 2022-11-30 | Stop reason: HOSPADM

## 2022-11-30 RX ORDER — HYDROMORPHONE HYDROCHLORIDE 1 MG/ML
0.2 INJECTION, SOLUTION INTRAMUSCULAR; INTRAVENOUS; SUBCUTANEOUS EVERY 5 MIN PRN
Status: DISCONTINUED | OUTPATIENT
Start: 2022-11-30 | End: 2022-11-30 | Stop reason: HOSPADM

## 2022-11-30 RX ORDER — MORPHINE SULFATE 2 MG/ML
2 INJECTION, SOLUTION INTRAMUSCULAR; INTRAVENOUS EVERY 4 HOURS PRN
Status: DISCONTINUED | OUTPATIENT
Start: 2022-11-30 | End: 2022-12-04

## 2022-11-30 RX ORDER — PHENYLEPHRINE HCL IN 0.9% NACL 1 MG/10 ML
SYRINGE (ML) INTRAVENOUS
Status: DISCONTINUED | OUTPATIENT
Start: 2022-11-30 | End: 2022-11-30

## 2022-11-30 RX ORDER — MORPHINE SULFATE 2 MG/ML
1 INJECTION, SOLUTION INTRAMUSCULAR; INTRAVENOUS EVERY 4 HOURS PRN
Status: DISCONTINUED | OUTPATIENT
Start: 2022-11-30 | End: 2022-12-04 | Stop reason: HOSPADM

## 2022-11-30 RX ORDER — MIDAZOLAM HYDROCHLORIDE 1 MG/ML
INJECTION, SOLUTION INTRAMUSCULAR; INTRAVENOUS
Status: DISCONTINUED | OUTPATIENT
Start: 2022-11-30 | End: 2022-11-30

## 2022-11-30 RX ORDER — PROPOFOL 10 MG/ML
VIAL (ML) INTRAVENOUS
Status: DISCONTINUED | OUTPATIENT
Start: 2022-11-30 | End: 2022-11-30

## 2022-11-30 RX ORDER — ONDANSETRON 2 MG/ML
INJECTION INTRAMUSCULAR; INTRAVENOUS
Status: DISCONTINUED | OUTPATIENT
Start: 2022-11-30 | End: 2022-11-30

## 2022-11-30 RX ORDER — DEXMEDETOMIDINE HYDROCHLORIDE 100 UG/ML
INJECTION, SOLUTION INTRAVENOUS
Status: DISCONTINUED | OUTPATIENT
Start: 2022-11-30 | End: 2022-11-30

## 2022-11-30 RX ORDER — FENTANYL CITRATE 50 UG/ML
INJECTION, SOLUTION INTRAMUSCULAR; INTRAVENOUS
Status: DISCONTINUED | OUTPATIENT
Start: 2022-11-30 | End: 2022-11-30

## 2022-11-30 RX ORDER — LIDOCAINE HYDROCHLORIDE 10 MG/ML
INJECTION, SOLUTION INTRAVENOUS
Status: DISCONTINUED | OUTPATIENT
Start: 2022-11-30 | End: 2022-11-30

## 2022-11-30 RX ORDER — ONDANSETRON 2 MG/ML
4 INJECTION INTRAMUSCULAR; INTRAVENOUS ONCE AS NEEDED
Status: DISCONTINUED | OUTPATIENT
Start: 2022-11-30 | End: 2022-11-30 | Stop reason: HOSPADM

## 2022-11-30 RX ADMIN — MIDAZOLAM HYDROCHLORIDE 2 MG: 1 INJECTION, SOLUTION INTRAMUSCULAR; INTRAVENOUS at 04:11

## 2022-11-30 RX ADMIN — Medication 100 MCG: at 06:11

## 2022-11-30 RX ADMIN — CEFAZOLIN SODIUM 1 G: 1 SOLUTION INTRAVENOUS at 06:11

## 2022-11-30 RX ADMIN — LIDOCAINE HYDROCHLORIDE 50 MG: 10 INJECTION, SOLUTION INTRAVENOUS at 05:11

## 2022-11-30 RX ADMIN — ACETAMINOPHEN 1000 MG: 10 INJECTION INTRAVENOUS at 05:11

## 2022-11-30 RX ADMIN — Medication 100 MCG: at 05:11

## 2022-11-30 RX ADMIN — IOHEXOL 75 ML: 350 INJECTION, SOLUTION INTRAVENOUS at 09:11

## 2022-11-30 RX ADMIN — DEXMEDETOMIDINE HYDROCHLORIDE 8 MCG: 100 INJECTION, SOLUTION INTRAVENOUS at 07:11

## 2022-11-30 RX ADMIN — Medication 100 MCG: at 07:11

## 2022-11-30 RX ADMIN — SUCCINYLCHOLINE CHLORIDE 100 MG: 20 INJECTION, SOLUTION INTRAMUSCULAR; INTRAVENOUS at 05:11

## 2022-11-30 RX ADMIN — PANTOPRAZOLE SODIUM 40 MG: 40 INJECTION, POWDER, FOR SOLUTION INTRAVENOUS at 10:11

## 2022-11-30 RX ADMIN — ONDANSETRON 4 MG: 2 INJECTION INTRAMUSCULAR; INTRAVENOUS at 07:11

## 2022-11-30 RX ADMIN — SODIUM CHLORIDE: 9 INJECTION, SOLUTION INTRAVENOUS at 04:11

## 2022-11-30 RX ADMIN — FENTANYL CITRATE 100 MCG: 50 INJECTION, SOLUTION INTRAMUSCULAR; INTRAVENOUS at 05:11

## 2022-11-30 RX ADMIN — Medication 200 MCG: at 05:11

## 2022-11-30 RX ADMIN — PROPOFOL 150 MG: 10 INJECTION, EMULSION INTRAVENOUS at 05:11

## 2022-11-30 NOTE — ASSESSMENT & PLAN NOTE
[facility] ambulatory encounter  FAMILY PRACTICE OFFICE VISIT    CHIEF COMPLAINT:    Chief Complaint   Patient presents with   • Follow-up     2 week FUV - Patient stopped taking Eliquis, states was only told to take the medication for 2 weeks.        SUBJECTIVE:  Connie Avila is a 62 year old female who presents today for follow up.    Recently seen in ED with Dx of pulmonary embolism.  Possibly from previous Covid 19 infection.  She does not recall being sick but does have positive covid 19 antibodies.  She was on Eliquis 5 mg 2 tab bid for 7 days and then 1 tab BID.    She has no bleeding on Eliquis and is compliant with medications. She misunderstood instructions and after 2 weeks she completely stopped her Eliquis.  She does have the tablets at home though and will restart them 1 tablet BID. Plan is to be on Eliquis minimally 3 months, possibly 6 months.      She really has no risk factors for PE other than tobacco use.    She walks everywhere and on a daily basis.  No recent travel.  No personal of family history of blood clot disease.  No recent surgeries.      She reports feeling fine today.  Walked here with no shortness of breath and no chest pain.  She is back at work with no problems.     Health maintnence:  Due for colon screening.  Agrees to Cologuard..  Has one at home but hasn't done it yet.  Due for mammogram, last one was 3/2016.  Agrees to once Covid 19 restrictions are lifted.  Needs transportation to [facility].   Declines lung cancer screening.  Up to date with pap. 2/2021 Negative HPV, negative cytology     She is G0, Last pap 2/2021, negative cytology, negative HPV.  Not sexually active.  Lives alone and works part time.  Smokes but not much, less than 1/2 PPD.  Drinks rum 1-2 glass at night.    Last mammogram was 3/2016.     Patient has a history of type 2 diabetes. She is currently on metformin 500 mg, 1 tablet twice daily (was on  2 bid but had bad diarrhea), and Levemir insulin 36  Jose Young is a 72yoM with a history of achalasia who underwent a POEM on 11/29    - patient seen and examined   - no acute events overnight  - morning labs stable  - UGI this morning for swallow assessment  - NPO until imaging study obtained  - transition from PCA to IV pushes-- plan to transition to PO regimen when appropriate  - dietician consulted  - out of bed, ambulate   units at bedtime. She is taking all of her medications as prescribed. She denies any skipped doses. She does not check her blood sugars but is willing to restart since her a1c is elevated.  She states she does occasionally feel a bit shaky if she doesn't eat.  She is still working on eating a healthier diet. She is trying to eat less junk food. She does like to walk to work, which is about 1/2 mile away each day.    Will increase Levemir dose to 40 units.    Hemoglobin A1C (%)   Date Value   05/24/2021 9.0 (H)     Patient has a history of hyperlipidemia. She is on simvastatin 20mg daily. She reports no myalgias. Her last lipid panel is reviewed LDL in good range    Blood pressure has been controlled.  She is on losartan 25mg daily for kidney protection.    Chronic  watery diarrhea after eating meals. Now not as bad since metformin dose decreased to 1 twice a day.  Brown with no blood.    Not overly bothersome. More formed at this time.  Notices that tomatoes make it worse. No odor.      Sleep is doing well.  Does not like melatonin.    Review of systems:   All systems are reviewed, and negative, except as listed in the history of present illness.     OBJECTIVE:  PROBLEM LIST:   Patient Active Problem List   Diagnosis   • Hypertension, essential   • Diabetes type 2, uncontrolled (CMS/HCC)   • Hyperlipidemia LDL goal <100       PAST HISTORIES:   I have reviewed the past medical history, family history, social history, medications and allergies listed in the medical record as obtained by my nursing staff and support staff and agree with their documentation.    PHYSICAL EXAM:   Vital Signs:    Visit Vitals  /70 (BP Location: LUE - Left upper extremity)   Pulse 90   Resp 20   Ht 5' 5\" (1.651 m)   Wt 85.9 kg   SpO2 96%   BMI 31.51 kg/m²     Pulse Ox Interpretation:  Pulse ox is normal  General:   Alert, cooperative, conversive in no acute distress.  Skin:  Warm and dry without rash.    Head:  Normocephalic,  atraumatic.   Neck:  Trachea is midline.  No carotid bruit.  Eyes:  Normal conjunctivae and sclerae. EOMI  ENT:  EAC clear, TM with good light reflex.  Mask in place.  Cardiovascular:  Symmetrical pulses.  Regular rate and rhythm without murmur.  Respiratory:   Normal respiratory effort.  Clear to auscultation.  No wheezes, rales or rhonchi.  Gastrointestinal:  Soft and nontender.  Normal bowel sounds.   Musculoskeletal:  No deformity or edema.   Back:  Normal alignment.  No costovertebral angle tenderness.  Neurologic:  Oriented x4.  No focal deficits.  Psychiatric:  Cooperative.  Appropriate mood and affect.      LAB RESULTS:   Hemoglobin A1C (%)   Date Value   02/18/2021 7.8 (H)     Sodium (mmol/L)   Date Value   05/07/2021 142     Potassium (mmol/L)   Date Value   05/07/2021 3.6     Chloride (mmol/L)   Date Value   05/07/2021 103     Glucose (mg/dL)   Date Value   05/07/2021 199 (H)     Calcium (mg/dL)   Date Value   05/07/2021 9.6     Carbon Dioxide (mmol/L)   Date Value   05/07/2021 27     BUN (mg/dL)   Date Value   05/07/2021 11     Creatinine (mg/dL)   Date Value   05/07/2021 0.63     GOT/AST (Units/L)   Date Value   05/07/2021 19     GPT/ALT (Units/L)   Date Value   05/07/2021 23     No results found for: GGTP  Alkaline Phosphatase (Units/L)   Date Value   05/07/2021 95     Bilirubin, Total (mg/dL)   Date Value   05/07/2021 0.4     Cholesterol (mg/dL)   Date Value   11/16/2020 225 (H)     HDL (mg/dL)   Date Value   11/16/2020 50     Cholesterol/ HDL Ratio (no units)   Date Value   11/16/2020 4.5 (H)     Triglycerides (mg/dL)   Date Value   11/16/2020 231 (H)     LDL (mg/dL)   Date Value   11/16/2020 129         ASSESSMENT:   1. Uncontrolled type 2 diabetes mellitus with hyperglycemia (CMS/[facility])    2. Hyperlipidemia LDL goal <100    3. Hypertension, essential    4. Acute pulmonary embolism without acute cor pulmonale, unspecified pulmonary embolism type (CMS/[facility])        PLAN:   Orders Placed This Encounter    • Glycohemoglobin   • apixaBAN (Eliquis) 5 MG Tab   • insulin detemir (Levemir FlexTouch) 100 UNIT/ML pen-injector   Acute pulmonary embolism without acute cor pulmonale, unspecified pulmonary embolism type (CMS/HCC)  (primary encounter diagnosis)  Comment:   Plan: SARS-COV-2 IGG        Continue on Eliquis BID .  Will check hematology for further assessment of risk and opinion on how long to continue with anticoagulant.    Did discuss risk of bleeding on Eliquis and she has no current complaints but will use caution and call if any problems.    Uncontrolled type 2 diabetes mellitus with hyperglycemia (CMS/[facility])  Comment:   Plan:   Increase Levemir to 40 units daily.  Continue Metformin.  Work on improving diet and keep walking.     Hyperlipidemia LDL goal <100  Comment:   Plan: controlled on statin.    Hypertension, essential  Comment:   Plan: controlled on ARB          Return in about 3 months (around 8/26/2021) for diabetes follow up , lab prior.    Instructions provided as documented in the after visit summary.    The patient indicated understanding of the diagnosis and agreed with the plan of care.

## 2022-11-30 NOTE — NURSING
Nurses Note -- 4 Eyes      11/30/2022   4:57 AM      Skin assessed during: Admit      [x] No Pressure Injuries Present    []Prevention Measures Documented      [] Yes- Altered Skin Integrity Present or Discovered   [] LDA Added if Not in Epic (Describe Wound)   [] New Altered Skin Integrity was Present on Admit and Documented in LDA   [] Wound Image Taken    Wound Care Consulted? No    Attending Nurse:  Carrie Camacho RN     Second RN/Staff Member:  Angel Raya RN

## 2022-11-30 NOTE — OP NOTE
DATE OF PROCEDURE: 11/29/2022    PRE OP DIAGNOSIS: Achalasia [K22.0]    POST OP DIAGNOSIS: Achalasia [K22.0]    PROCEDURE: Procedure(s) (LRB):  MYOTOMY, PERORAL, ENDOSCOPIC POEM (N/A)    Surgeon(s) and Role:     * Dianne Aguirre MD - Primary     * Maria Teresa Teran MD - Resident - Assisting     * Diomedes Hanson Jr., MD    ANESTHESIA: General    INDICATION: Patient is a 72 year-old male who presented with progressive dysphagia, 50-lb weight loss, and Eckardt score 7/12. Work-up revealed type 2 achalasia. After discussing the risks, benefits and alternatives to surgical myotomy, he elected to proceed in an endoscopic fashion.     FINDINGS:  1. Initial EGD demonstrating a dilated esophagus with chronic stasis changes with hypertonic LES unable to traverse with EGD  2. GEJ at 47 cm  3. Mucosotomy 37-38 cm  4. Myotomy from 39-48 cm  5. Wide open LES on completion EGD     DESCRIPTION OF PROCEDURE: Patient was met in the pre-op area and his identity and consent confirmed. He was then brought back to the Operating Room and placed in the supine position. General anesthesia was induced and he was intubated without incident. Pre-op antibiotics were administered, and SCDs and a warming blanket were placed. A pre-procedural time-out was performed by all members of the surgical and anesthesia teams. A bite block was placed. At this time we began with an EGD. The scope was placed into the oropharynx and guided down the esophagus which was dilated, tortuous, and with evidence of chronic stasis changes and atrophic mucosa. The scope was brought to the LES which was very tight and unable to be traversed with the endoscope. The EGJ was identified and the z-line measured to be at 47 cm. We then brought the scope back proximally and performed our mucosotomy followed by myotomy in an anterior orientation. First, a submucosal injection of a solution of Orise gel was used to lift the mucosa at 37-cm and a 1-cm mucosal  incision made longitudinally. Next, the endoscope with a clear cap was used to enter into the submucosal tunnel and continue dissection on the anterior aspect down to the EGJ. Dissection was continued until the submucosal tunnel was extended passing the LES into the cardia. The endoscope was withdrawn and the myotomy started at 39 cm from the incisors using the ERBE knife to perform a circular myotomy (in an anterior orientation). The myotomy was extended passing the LES to 48 cm. After completion of the myotomy, there was no evidence of bleeding noted on the inspection of the myotomy edges and submucosal tunnel. The LES was easily traversed and noted to be patulous and open. The mucosal entrance to the submucosal tunnel was closed using endoscopic clips (6). The endoscope was then passed back into the stomach and the air suctioned out to desufflate. The patient was awoken from anesthesia and extubated without complication. He was brought to the PACU in good condition having tolerated the operation well. Surgical sponge and instrument count was correct at the end of the case.     EBL: <1 mL  Specimens: None  Drains: None  Complications: None apparent  Dispo: Surgical floor, strict NPO until after UGI in AM to r/o leak     I was present and scrubbed for the entire procedure.    I request a modifier 22 for this case. Given the severity of disease, degree of dilation of the esophagus, and thickness of the muscle (the thickest I or my partner have seen), this case took nearly 2 hours longer than typical to complete in standard fashion.     Dianne Aguirre  11/29/22

## 2022-11-30 NOTE — BRIEF OP NOTE
[facility] (2nd Fl)  Brief Operative Note    SUMMARY     Surgery Date: 11/29/2022     Surgeon(s) and Role:     * Dianne Aguirre MD - Primary     * Maria Teresa Teran MD - Resident - Assisting     * Diomedes Hanson Jr., MD        Pre-op Diagnosis:  Achalasia [K22.0]    Post-op Diagnosis:  Post-Op Diagnosis Codes:     * Achalasia [K22.0]    Procedure(s) (LRB):  MYOTOMY, PERORAL, ENDOSCOPIC POEM (N/A)    Anesthesia: General    Operative Findings: Endoscopic myotomy (POEM) performed. Post-procedural EGD showing LES patent.     Estimated Blood Loss: Minimal         Specimens:   Specimen (24h ago, onward)      None            [id number]

## 2022-11-30 NOTE — PROGRESS NOTES
[facility]  General Surgery  Progress Note    Subjective:     History of Present Illness:  Patient is a 72 year-old male with HTN and HLD who presents for discussion of management for achalasia. He first developed dysphagia primarily to solids about 5 years ago which has slowly progressed in severity until the last 2 months, during which he's experienced daily dysphagia (solids>liquids), daily regurgitation, and overall 50lb weight loss. He denies chest pain, nausea, vomiting, retching, heartburn, or abdominal pain. His Eckardt score is 7/12.       Post-Op Info:  Procedure(s) (LRB):  MYOTOMY, PERORAL, ENDOSCOPIC POEM (N/A)   1 Day Post-Op     Interval History: Patient seen and examined this morning. No acute events overnight. UGI this morning for swallowing assessment. Pain controlled.     Medications:  Continuous Infusions:   lactated ringers 100 mL/hr at 11/29/22 1716    morphine       Scheduled Meds:   acetaminophen  1,000 mg Intravenous Q8H    ceFAZolin (ANCEF) IVPB  1 g Intravenous Q8H    enoxaparin  40 mg Subcutaneous Daily    pantoprazole  40 mg Intravenous BID     PRN Meds:     Review of patient's allergies indicates:  No Known Allergies  Objective:     Vital Signs (Most Recent):  Temp: 98.4 °F (36.9 °C) (11/30/22 0428)  Pulse: 70 (11/30/22 0428)  Resp: 18 (11/30/22 0428)  BP: 129/68 (11/30/22 0428)  SpO2: 95 % (11/30/22 0428) Vital Signs (24h Range):  Temp:  [97.5 °F (36.4 °C)-99.5 °F (37.5 °C)] 98.4 °F (36.9 °C)  Pulse:  [67-79] 70  Resp:  [12-23] 18  SpO2:  [92 %-100 %] 95 %  BP: ()/(58-75) 129/68     Weight: 60.3 kg (133 lb)  Body mass index is 18.04 kg/m².    Intake/Output - Last 3 Shifts         11/28 0700  11/29 0659 11/29 0700  11/30 0659    IV Piggyback  2500    Total Intake(mL/kg)  2500 (41.5)    Urine (mL/kg/hr)  550    Total Output  550    Net  +1950          Urine Occurrence  2 x            Physical Exam  Vitals and nursing note reviewed.   Constitutional:       General: He  is not in acute distress.     Appearance: Normal appearance.   HENT:      Head: Normocephalic and atraumatic.      Nose: Nose normal.      Mouth/Throat:      Mouth: Mucous membranes are moist.   Cardiovascular:      Rate and Rhythm: Normal rate and regular rhythm.      Pulses: Normal pulses.   Pulmonary:      Effort: Pulmonary effort is normal. No respiratory distress.   Abdominal:      General: Abdomen is flat. There is no distension.      Palpations: Abdomen is soft.      Tenderness: There is no abdominal tenderness.   Musculoskeletal:         General: Normal range of motion.   Skin:     General: Skin is warm and dry.   Neurological:      Mental Status: He is alert.       Significant Labs:  I have reviewed all pertinent lab results within the past 24 hours.  CBC:   Recent Labs   Lab 11/30/22  0555   WBC 13.61*   RBC 3.77*   HGB 11.3*   HCT 33.0*      MCV 88   MCH 30.0   MCHC 34.2     CMP:   Recent Labs   Lab 11/30/22  0555   GLU 94   CALCIUM 8.6*   ALBUMIN 2.7*   PROT 6.0   *   K 4.2   CO2 22*      BUN 11   CREATININE 0.8   ALKPHOS 49*   ALT 10   AST 17   BILITOT 0.9       Significant Diagnostics:  I have reviewed all pertinent imaging results/findings within the past 24 hours.    Assessment/Plan:     Achalasia  Jose Young is a 72yoM with a history of achalasia who underwent a POEM on 11/29    - patient seen and examined   - no acute events overnight  - morning labs stable  - UGI this morning for swallow assessment  - NPO until imaging study obtained  - transition from PCA to IV pushes-- plan to transition to PO regimen when appropriate  - dietician consulted  - out of bed, ambulate        Good Valiente MD  General Surgery  [facility]

## 2022-11-30 NOTE — ANESTHESIA PROCEDURE NOTES
Intubation    Date/Time: 11/30/2022 5:06 PM  Performed by: Franky Chery CRNA  Authorized by: Ion Fournier MD     Intubation:     Induction:  Rapid sequence induction    Intubated:  Postinduction    Mask Ventilation:  Not attempted    Attempted By:  CRNA    Method of Intubation:  Video laryngoscopy    Blade:  Mixon 3    Laryngeal View Grade: Grade I - full view of cords      Difficult Airway Encountered?: No      Complications:  None    Airway Device:  Oral endotracheal tube    Airway Device Size:  7.5    Style/Cuff Inflation:  Cuffed    Inflation Amount (mL):  7    Tube secured:  23    Secured at:  The lips    Placement Verified By:  Capnometry    Complicating Factors:  None    Findings Post-Intubation:  BS equal bilateral    
minimal

## 2022-11-30 NOTE — NURSING
Pt. Ambulated in the room with my assistance. Pt.felt weak and  unable to continue the walk into the bathroom. We sat back in the bed and I instructed the patient that he should not get up without assistance and to use the urinal. Pt. Is being placed on fall precautions. Wrist band applied and SCD's in place.Family at bedside and given the same instructions. End of report.

## 2022-11-30 NOTE — CONSULTS
Food & Nutrition  Education    Diet Education: Clear/Full/Soft diet Education  Time Spent: 10 minutes   Learners: Pt and pt's caregivers       Nutrition Education provided with handouts:   Full liquids diet recommended items, soft diet food to choose and foods to avoid. Surgery soft diet provided.     Comments: Spoke w/ pt and pt's caregiver at bedside. Reiterated following a clear liquid diet for 1 day, full liquids for 3 days, and soft diet until 2 week follow-up. RD answered all questions pertaining to diet, and pt and pt's caregiver had no additional questions for me today. LBM noted- 11/28/22      All questions and concerns answered. Dietitian's contact information provided.       Follow-Up: Yes     Please Re-consult as needed      Thanks!     Recommendations  Rec'd ADAT to clear liquid diet initially and then full liquids (diet advancement per physician)  RD following

## 2022-11-30 NOTE — PROGRESS NOTES
General Surgery Note:     UGI reviewed, and contrast is traveling down the extramucosal dissection flap. Findings discussed with Dr. Aguirre and the patient.     Will plan to return to the OR for repeat EGD and likely additional mucosal clip placement. Today vs tomorrow depending on OR availability.     Juany Bentley MD  General Surgery Resident, PGY V  Pager [phone]

## 2022-11-30 NOTE — NURSING
Pt. Arrived to the floor awake alert and oriented x4. Pt. Had no sub c/o of nausea or pain. PCA pump verified with OC. Vitals stable.Bed wheels locked and bed in lowest position. End of Report.

## 2022-11-30 NOTE — PLAN OF CARE
[facility]  Discharge Assessment    Primary Care Provider: Malorie Madera, DO     Discharge Assessment (most recent)       BRIEF DISCHARGE ASSESSMENT - 11/30/22 1201          Discharge Planning    Assessment Type Discharge Planning Brief Assessment     Resource/Environmental Concerns none     Support Systems Children     Equipment Currently Used at Home none     Current Living Arrangements home/apartment/condo     Patient/Family Anticipates Transition to home with family     Patient/Family Anticipated Services at Transition none     DME Needed Upon Discharge  none                     Spoke with patient and daughter at bedside to complete d/c planning assessment. Patient lives alone in a single story home with no steps to enter. Independent with ADL'S. No DME in home. Verified PCP, Pharmacy and health insurance. Patient eager to d/c home today. UGI done and dietician has visited. However, patient has no diet ordered yet to determine toleration. Message sent to MD. Will continue to follow for needs.

## 2022-11-30 NOTE — NURSING TRANSFER
Nursing Transfer Note      11/29/2022     Reason patient is being transferred: s/p endoscopic myotomy    Transfer To: 522    Transfer via bed    Transfer with 2L NC    Transported by PCT    Medicines sent: morphine PCA inuse    Any special needs or follow-up needed: routine    Chart send with patient: Yes    Notified: daughter    Patient reassessed at: 11/29/22 @1930    Report given to VIRAL Butler

## 2022-11-30 NOTE — HPI
Patient is a 72 year-old male with HTN and HLD who presents for discussion of management for achalasia. He first developed dysphagia primarily to solids about 5 years ago which has slowly progressed in severity until the last 2 months, during which he's experienced daily dysphagia (solids>liquids), daily regurgitation, and overall 50lb weight loss. He denies chest pain, nausea, vomiting, retching, heartburn, or abdominal pain. His Eckardt score is 7/12.

## 2022-11-30 NOTE — SUBJECTIVE & OBJECTIVE
Interval History: Patient seen and examined this morning. No acute events overnight. UGI this morning for swallowing assessment. Pain controlled.     Medications:  Continuous Infusions:   lactated ringers 100 mL/hr at 11/29/22 1716    morphine       Scheduled Meds:   acetaminophen  1,000 mg Intravenous Q8H    ceFAZolin (ANCEF) IVPB  1 g Intravenous Q8H    enoxaparin  40 mg Subcutaneous Daily    pantoprazole  40 mg Intravenous BID     PRN Meds:     Review of patient's allergies indicates:  No Known Allergies  Objective:     Vital Signs (Most Recent):  Temp: 98.4 °F (36.9 °C) (11/30/22 0428)  Pulse: 70 (11/30/22 0428)  Resp: 18 (11/30/22 0428)  BP: 129/68 (11/30/22 0428)  SpO2: 95 % (11/30/22 0428) Vital Signs (24h Range):  Temp:  [97.5 °F (36.4 °C)-99.5 °F (37.5 °C)] 98.4 °F (36.9 °C)  Pulse:  [67-79] 70  Resp:  [12-23] 18  SpO2:  [92 %-100 %] 95 %  BP: ()/(58-75) 129/68     Weight: 60.3 kg (133 lb)  Body mass index is 18.04 kg/m².    Intake/Output - Last 3 Shifts         11/28 0700  11/29 0659 11/29 0700  11/30 0659    IV Piggyback  2500    Total Intake(mL/kg)  2500 (41.5)    Urine (mL/kg/hr)  550    Total Output  550    Net  +1950          Urine Occurrence  2 x            Physical Exam  Vitals and nursing note reviewed.   Constitutional:       General: He is not in acute distress.     Appearance: Normal appearance.   HENT:      Head: Normocephalic and atraumatic.      Nose: Nose normal.      Mouth/Throat:      Mouth: Mucous membranes are moist.   Cardiovascular:      Rate and Rhythm: Normal rate and regular rhythm.      Pulses: Normal pulses.   Pulmonary:      Effort: Pulmonary effort is normal. No respiratory distress.   Abdominal:      General: Abdomen is flat. There is no distension.      Palpations: Abdomen is soft.      Tenderness: There is no abdominal tenderness.   Musculoskeletal:         General: Normal range of motion.   Skin:     General: Skin is warm and dry.   Neurological:      Mental Status: He  is alert.       Significant Labs:  I have reviewed all pertinent lab results within the past 24 hours.  CBC:   Recent Labs   Lab 11/30/22  0555   WBC 13.61*   RBC 3.77*   HGB 11.3*   HCT 33.0*      MCV 88   MCH 30.0   MCHC 34.2     CMP:   Recent Labs   Lab 11/30/22  0555   GLU 94   CALCIUM 8.6*   ALBUMIN 2.7*   PROT 6.0   *   K 4.2   CO2 22*      BUN 11   CREATININE 0.8   ALKPHOS 49*   ALT 10   AST 17   BILITOT 0.9       Significant Diagnostics:  I have reviewed all pertinent imaging results/findings within the past 24 hours.

## 2022-11-30 NOTE — ANESTHESIA PREPROCEDURE EVALUATION
11/30/2022  Jose Young is a 72 y.o., male.  Pre-operative evaluation for Procedure(s) (LRB):  EGD (ESOPHAGOGASTRODUODENOSCOPY) (N/A)    Jose Young is a 72 y.o. male     Patient Active Problem List   Diagnosis    Cigar smoker motivated to quit    Elevated PSA, less than 10 ng/ml    Hyperlipidemia, mixed    Hypertension, essential    Achalasia    Left renal mass       Review of patient's allergies indicates:  No Known Allergies    No current facility-administered medications on file prior to encounter.     Current Outpatient Medications on File Prior to Encounter   Medication Sig Dispense Refill    ascorbic acid, vitamin C, (VITAMIN C) 250 MG tablet Take 250 mg by mouth once daily.      atorvastatin (LIPITOR) 40 MG tablet TAKE 1 TABLET(40 MG) BY MOUTH EVERY DAY 30 tablet 0    b complex vitamins tablet Take 1 tablet by mouth once daily.      cyproheptadine (PERIACTIN) 4 mg tablet Take 4 mg by mouth 3 (three) times daily. Pt needs refill      erythromycin (ROMYCIN) ophthalmic ointment APPLY A 3 MM RIBBON TO THE AFFECTED EYE AT NIGHT FOR 7 DAYS      hydroCHLOROthiazide (MICROZIDE) 12.5 mg capsule TAKE 1 CAPSULE(12.5 MG) BY MOUTH EVERY DAY (Patient not taking: Reported on 11/16/2022) 30 capsule 0    ofloxacin (OCUFLOX) 0.3 % ophthalmic solution INSTILL 1 DROP INTO THE RIGHT EYE FOUR TIMES DAILY FOR 7 DAYS      vitamin D (VITAMIN D3) 1000 units Tab Take 1,000 Units by mouth once daily.         Past Surgical History:   Procedure Laterality Date    COLONOSCOPY N/A 10/26/2022    Procedure: COLONOSCOPY;  Surgeon: Denver Ramos MD;  Location: [facility];  Service: Endoscopy;  Laterality: N/A;    ESOPHAGEAL MANOMETRY WITH MEASUREMENT OF IMPEDANCE N/A 11/07/2022    Procedure: MANOMETRY, ESOPHAGUS, WITH IMPEDANCE MEASUREMENT;  Surgeon: Delores Lomeli MD;  Location: [facility] ([address] [place] [address]);  Service:  Endoscopy;  Laterality: N/A;  instructions sent to [email or web address]    ESOPHAGOGASTRODUODENOSCOPY N/A 10/26/2022    Procedure: EGD (ESOPHAGOGASTRODUODENOSCOPY);  Surgeon: Denver Ramos MD;  Location: [facility];  Service: Endoscopy;  Laterality: N/A;  Medically Urgent  10/17 fully vaccinated; instructions to portal-st    MYOTOMY, PERORAL, ENDOSCOPIC N/A 2022    Procedure: MYOTOMY, PERORAL, ENDOSCOPIC POEM;  Surgeon: Dianne Aguirre MD;  Location: [address];  Service: General;  Laterality: N/A;       Social History     Socioeconomic History    Marital status: Single   Tobacco Use    Smoking status: Former     Types: Cigars, Cigarettes     Quit date: 2022     Years since quittin.3    Smokeless tobacco: Never    Tobacco comments:     Currently uses a non-nicotine vape pen   Substance and Sexual Activity    Alcohol use: Yes     Alcohol/week: 1.0 standard drink     Types: 1 Cans of beer per week    Drug use: Not Currently     Types: Marijuana    Sexual activity: Not Currently         CBC:   Recent Labs     22  0555   WBC 13.61*   RBC 3.77*   HGB 11.3*   HCT 33.0*      MCV 88   MCH 30.0   MCHC 34.2       CMP:   Recent Labs     22  0555   *   K 4.2      CO2 22*   BUN 11   CREATININE 0.8   GLU 94   MG 1.6   PHOS 2.6*   CALCIUM 8.6*   ALBUMIN 2.7*   PROT 6.0   ALKPHOS 49*   ALT 10   AST 17   BILITOT 0.9       INR  No results for input(s): PT, INR, PROTIME, APTT in the last 72 hours.        Diagnostic Studies:      EKD Echo:  No results found for this or any previous visit.        Pre-op Assessment    I have reviewed the Patient Summary Reports.     I have reviewed the Nursing Notes. I have reviewed the NPO Status.   I have reviewed the Medications.     Review of Systems      Physical Exam    Airway:  Mallampati: II   Mouth Opening: Normal  Tongue: Normal  Neck ROM: Normal ROM        Anesthesia Plan  Type of Anesthesia, risks & benefits  discussed:    Anesthesia Type: Gen ETT, Gen Natural Airway  Intra-op Monitoring Plan: Standard ASA Monitors  Post Op Pain Control Plan: multimodal analgesia  Induction:  IV  Airway Plan: Direct, Post-Induction  Informed Consent: Informed consent signed with the Patient and all parties understand the risks and agree with anesthesia plan.  All questions answered.   ASA Score: 3  Day of Surgery Review of History & Physical: H&P Update referred to the surgeon/provider.    Ready For Surgery From Anesthesia Perspective.     .

## 2022-12-01 LAB
ALBUMIN SERPL BCP-MCNC: 2.5 G/DL (ref 3.5–5.2)
ALP SERPL-CCNC: 47 U/L (ref 55–135)
ALT SERPL W/O P-5'-P-CCNC: 9 U/L (ref 10–44)
ANION GAP SERPL CALC-SCNC: 8 MMOL/L (ref 8–16)
AST SERPL-CCNC: 15 U/L (ref 10–40)
BASOPHILS # BLD AUTO: 0.02 K/UL (ref 0–0.2)
BASOPHILS NFR BLD: 0.2 % (ref 0–1.9)
BILIRUB SERPL-MCNC: 0.8 MG/DL (ref 0.1–1)
BUN SERPL-MCNC: 9 MG/DL (ref 8–23)
CALCIUM SERPL-MCNC: 8.4 MG/DL (ref 8.7–10.5)
CHLORIDE SERPL-SCNC: 105 MMOL/L (ref 95–110)
CO2 SERPL-SCNC: 22 MMOL/L (ref 23–29)
CREAT SERPL-MCNC: 0.8 MG/DL (ref 0.5–1.4)
DIFFERENTIAL METHOD: ABNORMAL
EOSINOPHIL # BLD AUTO: 0.1 K/UL (ref 0–0.5)
EOSINOPHIL NFR BLD: 1.4 % (ref 0–8)
ERYTHROCYTE [DISTWIDTH] IN BLOOD BY AUTOMATED COUNT: 14.6 % (ref 11.5–14.5)
EST. GFR  (NO RACE VARIABLE): >60 ML/MIN/1.73 M^2
GLUCOSE SERPL-MCNC: 72 MG/DL (ref 70–110)
HCT VFR BLD AUTO: 32.1 % (ref 40–54)
HGB BLD-MCNC: 10.7 G/DL (ref 14–18)
IMM GRANULOCYTES # BLD AUTO: 0.03 K/UL (ref 0–0.04)
IMM GRANULOCYTES NFR BLD AUTO: 0.3 % (ref 0–0.5)
LYMPHOCYTES # BLD AUTO: 1.4 K/UL (ref 1–4.8)
LYMPHOCYTES NFR BLD: 15.3 % (ref 18–48)
MAGNESIUM SERPL-MCNC: 1.7 MG/DL (ref 1.6–2.6)
MCH RBC QN AUTO: 29.1 PG (ref 27–31)
MCHC RBC AUTO-ENTMCNC: 33.3 G/DL (ref 32–36)
MCV RBC AUTO: 87 FL (ref 82–98)
MONOCYTES # BLD AUTO: 0.9 K/UL (ref 0.3–1)
MONOCYTES NFR BLD: 9.4 % (ref 4–15)
NEUTROPHILS # BLD AUTO: 6.7 K/UL (ref 1.8–7.7)
NEUTROPHILS NFR BLD: 73.4 % (ref 38–73)
NRBC BLD-RTO: 0 /100 WBC
PHOSPHATE SERPL-MCNC: 2.6 MG/DL (ref 2.7–4.5)
PLATELET # BLD AUTO: 205 K/UL (ref 150–450)
PMV BLD AUTO: 10 FL (ref 9.2–12.9)
POTASSIUM SERPL-SCNC: 4.2 MMOL/L (ref 3.5–5.1)
PROT SERPL-MCNC: 5.7 G/DL (ref 6–8.4)
RBC # BLD AUTO: 3.68 M/UL (ref 4.6–6.2)
SODIUM SERPL-SCNC: 135 MMOL/L (ref 136–145)
WBC # BLD AUTO: 9.07 K/UL (ref 3.9–12.7)

## 2022-12-01 PROCEDURE — 84100 ASSAY OF PHOSPHORUS: CPT

## 2022-12-01 PROCEDURE — 63600175 PHARM REV CODE 636 W HCPCS: Performed by: STUDENT IN AN ORGANIZED HEALTH CARE EDUCATION/TRAINING PROGRAM

## 2022-12-01 PROCEDURE — 80053 COMPREHEN METABOLIC PANEL: CPT

## 2022-12-01 PROCEDURE — C9113 INJ PANTOPRAZOLE SODIUM, VIA: HCPCS | Performed by: SURGERY

## 2022-12-01 PROCEDURE — 83735 ASSAY OF MAGNESIUM: CPT

## 2022-12-01 PROCEDURE — 63600175 PHARM REV CODE 636 W HCPCS: Performed by: SURGERY

## 2022-12-01 PROCEDURE — 63600175 PHARM REV CODE 636 W HCPCS

## 2022-12-01 PROCEDURE — 85025 COMPLETE CBC W/AUTO DIFF WBC: CPT

## 2022-12-01 PROCEDURE — 36415 COLL VENOUS BLD VENIPUNCTURE: CPT

## 2022-12-01 RX ADMIN — MORPHINE SULFATE 1 MG: 2 INJECTION, SOLUTION INTRAMUSCULAR; INTRAVENOUS at 01:12

## 2022-12-01 RX ADMIN — MORPHINE SULFATE 2 MG: 2 INJECTION, SOLUTION INTRAMUSCULAR; INTRAVENOUS at 03:12

## 2022-12-01 RX ADMIN — PANTOPRAZOLE SODIUM 40 MG: 40 INJECTION, POWDER, FOR SOLUTION INTRAVENOUS at 08:12

## 2022-12-01 RX ADMIN — SODIUM CHLORIDE, POTASSIUM CHLORIDE, SODIUM LACTATE AND CALCIUM CHLORIDE: 600; 310; 30; 20 INJECTION, SOLUTION INTRAVENOUS at 05:12

## 2022-12-01 RX ADMIN — ENOXAPARIN SODIUM 40 MG: 40 INJECTION SUBCUTANEOUS at 05:12

## 2022-12-01 RX ADMIN — SODIUM CHLORIDE, POTASSIUM CHLORIDE, SODIUM LACTATE AND CALCIUM CHLORIDE: 600; 310; 30; 20 INJECTION, SOLUTION INTRAVENOUS at 08:12

## 2022-12-01 NOTE — ASSESSMENT & PLAN NOTE
Jose Young is a 72yoM with a history of achalasia who underwent a POEM on 11/29 and revision 11/30    - patient seen and examined sp EGD last night for clip revision  - no acute events overnight  - morning labs stable  - UGI tomorrow morning for swallow assessment  - NPO until imaging study obtained tomorrow  - pain meds via IV pushes-- plan to transition to PO regimen when appropriate  - dietician consulted  - out of bed, ambulate    Dispo: hopefully home tomorrow pending upper GI

## 2022-12-01 NOTE — SUBJECTIVE & OBJECTIVE
Interval History: Patient seen and examined this morning. No acute events overnight. Taken for EGD last night for clip replacement. UGI tomorrow morning for swallowing assessment. Pain controlled. Remain strict NPO until then.    Medications:  Continuous Infusions:   lactated ringers 100 mL/hr at 12/01/22 0809     Scheduled Meds:   enoxaparin  40 mg Subcutaneous Daily    pantoprazole  40 mg Intravenous BID     PRN Meds:     Review of patient's allergies indicates:  No Known Allergies  Objective:     Vital Signs (Most Recent):  Temp: 99 °F (37.2 °C) (12/01/22 0753)  Pulse: 73 (12/01/22 0753)  Resp: 17 (12/01/22 0753)  BP: 112/60 (12/01/22 0753)  SpO2: 96 % (12/01/22 0753) Vital Signs (24h Range):  Temp:  [97.7 °F (36.5 °C)-99.9 °F (37.7 °C)] 99 °F (37.2 °C)  Pulse:  [73-87] 73  Resp:  [16-24] 17  SpO2:  [93 %-100 %] 96 %  BP: (106-148)/(55-69) 112/60     Weight: 60.3 kg (133 lb)  Body mass index is 18.04 kg/m².    Intake/Output - Last 3 Shifts         11/29 0700  11/30 0659 11/30 0700  12/01 0659 12/01 0700  12/02 0659    P.O.  0     I.V. (mL/kg)  805.4 (13.4)     IV Piggyback 2500 800     Total Intake(mL/kg) 2500 (41.5) 1605.4 (26.6)     Urine (mL/kg/hr) 550 1050 (0.7)     Total Output 550 1050     Net +1950 +555.4            Urine Occurrence 2 x              Physical Exam  Vitals and nursing note reviewed.   Constitutional:       General: He is not in acute distress.     Appearance: Normal appearance.   HENT:      Head: Normocephalic and atraumatic.      Nose: Nose normal.      Mouth/Throat:      Mouth: Mucous membranes are moist.   Cardiovascular:      Rate and Rhythm: Normal rate and regular rhythm.      Pulses: Normal pulses.   Pulmonary:      Effort: Pulmonary effort is normal. No respiratory distress.   Abdominal:      General: Abdomen is flat. There is no distension.      Palpations: Abdomen is soft.      Tenderness: There is no abdominal tenderness.   Musculoskeletal:         General: Normal range of motion.    Skin:     General: Skin is warm and dry.   Neurological:      Mental Status: He is alert.       Significant Labs:  I have reviewed all pertinent lab results within the past 24 hours.  CBC:   Recent Labs   Lab 12/01/22  0446   WBC 9.07   RBC 3.68*   HGB 10.7*   HCT 32.1*      MCV 87   MCH 29.1   MCHC 33.3       CMP:   Recent Labs   Lab 12/01/22 0446   GLU 72   CALCIUM 8.4*   ALBUMIN 2.5*   PROT 5.7*   *   K 4.2   CO2 22*      BUN 9   CREATININE 0.8   ALKPHOS 47*   ALT 9*   AST 15   BILITOT 0.8         Significant Diagnostics:  I have reviewed all pertinent imaging results/findings within the past 24 hours.

## 2022-12-01 NOTE — PROGRESS NOTES
[facility]  General Surgery  Progress Note    Subjective:     History of Present Illness:  Patient is a 72 year-old male with HTN and HLD who presents for discussion of management for achalasia. He first developed dysphagia primarily to solids about 5 years ago which has slowly progressed in severity until the last 2 months, during which he's experienced daily dysphagia (solids>liquids), daily regurgitation, and overall 50lb weight loss. He denies chest pain, nausea, vomiting, retching, heartburn, or abdominal pain. His Eckardt score is 7/12.       Post-Op Info:  Procedure(s) (LRB):  EGD (ESOPHAGOGASTRODUODENOSCOPY) with musocal clip application (N/A)  INSERTION - MUCOSAL CLIP   1 Day Post-Op     Interval History: Patient seen and examined this morning. No acute events overnight. Taken for EGD last night for clip replacement. UGI tomorrow morning for swallowing assessment. Pain controlled. Remain strict NPO until then.    Medications:  Continuous Infusions:   lactated ringers 100 mL/hr at 12/01/22 0809     Scheduled Meds:   enoxaparin  40 mg Subcutaneous Daily    pantoprazole  40 mg Intravenous BID     PRN Meds:     Review of patient's allergies indicates:  No Known Allergies  Objective:     Vital Signs (Most Recent):  Temp: 99 °F (37.2 °C) (12/01/22 0753)  Pulse: 73 (12/01/22 0753)  Resp: 17 (12/01/22 0753)  BP: 112/60 (12/01/22 0753)  SpO2: 96 % (12/01/22 0753) Vital Signs (24h Range):  Temp:  [97.7 °F (36.5 °C)-99.9 °F (37.7 °C)] 99 °F (37.2 °C)  Pulse:  [73-87] 73  Resp:  [16-24] 17  SpO2:  [93 %-100 %] 96 %  BP: (106-148)/(55-69) 112/60     Weight: 60.3 kg (133 lb)  Body mass index is 18.04 kg/m².    Intake/Output - Last 3 Shifts         11/29 0700  11/30 0659 11/30 0700 12/01 0659 12/01 0700 12/02 0659    P.O.  0     I.V. (mL/kg)  805.4 (13.4)     IV Piggyback 2500 800     Total Intake(mL/kg) 2500 (41.5) 1605.4 (26.6)     Urine (mL/kg/hr) 550 1050 (0.7)     Total Output 550 1050     Net +1950  +555.4            Urine Occurrence 2 x              Physical Exam  Vitals and nursing note reviewed.   Constitutional:       General: He is not in acute distress.     Appearance: Normal appearance.   HENT:      Head: Normocephalic and atraumatic.      Nose: Nose normal.      Mouth/Throat:      Mouth: Mucous membranes are moist.   Cardiovascular:      Rate and Rhythm: Normal rate and regular rhythm.      Pulses: Normal pulses.   Pulmonary:      Effort: Pulmonary effort is normal. No respiratory distress.   Abdominal:      General: Abdomen is flat. There is no distension.      Palpations: Abdomen is soft.      Tenderness: There is no abdominal tenderness.   Musculoskeletal:         General: Normal range of motion.   Skin:     General: Skin is warm and dry.   Neurological:      Mental Status: He is alert.       Significant Labs:  I have reviewed all pertinent lab results within the past 24 hours.  CBC:   Recent Labs   Lab 12/01/22  0446   WBC 9.07   RBC 3.68*   HGB 10.7*   HCT 32.1*      MCV 87   MCH 29.1   MCHC 33.3       CMP:   Recent Labs   Lab 12/01/22  0446   GLU 72   CALCIUM 8.4*   ALBUMIN 2.5*   PROT 5.7*   *   K 4.2   CO2 22*      BUN 9   CREATININE 0.8   ALKPHOS 47*   ALT 9*   AST 15   BILITOT 0.8         Significant Diagnostics:  I have reviewed all pertinent imaging results/findings within the past 24 hours.    Assessment/Plan:     Achalasia  Jose Young is a 72yoM with a history of achalasia who underwent a POEM on 11/29 and revision 11/30    - patient seen and examined sp EGD last night for clip revision  - no acute events overnight  - morning labs stable  - UGI tomorrow morning for swallow assessment  - NPO until imaging study obtained tomorrow  - pain meds via IV pushes-- plan to transition to PO regimen when appropriate  - dietician consulted  - out of bed, ambulate    Dispo: hopefully home tomorrow pending upper GI         Maria Teresa Teran MD  General Surgery  [facility] -  [facility]

## 2022-12-01 NOTE — TRANSFER OF CARE
Anesthesia Transfer of Care Note    Patient: Jose Young    Procedure(s) Performed: Procedure(s) (LRB):  EGD (ESOPHAGOGASTRODUODENOSCOPY) with musocal clip application (N/A)    Patient location: PACU    Anesthesia Type: general    Transport from OR: Transported from OR on 6-10 L/min O2 by face mask with adequate spontaneous ventilation    Post pain: adequate analgesia    Post assessment: no apparent anesthetic complications and tolerated procedure well    Post vital signs: stable    Level of consciousness: awake, alert and oriented    Nausea/Vomiting: no nausea/vomiting    Complications: none    Transfer of care protocol was followed      Last vitals:   Visit Vitals  BP (!) 148/66 (BP Location: Left arm, Patient Position: Lying)   Pulse 82   Temp 37.7 °C (99.9 °F)   Resp 18   Ht 6' (1.829 m)   Wt 60.3 kg (133 lb)   SpO2 98%   BMI 18.04 kg/m²

## 2022-12-01 NOTE — ANESTHESIA POSTPROCEDURE EVALUATION
Anesthesia Post Evaluation    Patient: Jose Young    Procedure(s) Performed: Procedure(s) (LRB):  EGD (ESOPHAGOGASTRODUODENOSCOPY) with musocal clip application (N/A)    Final Anesthesia Type: general      Patient location during evaluation: PACU  Patient participation: Yes- Able to Participate  Level of consciousness: awake and alert  Post-procedure vital signs: reviewed and stable  Pain management: adequate  Airway patency: patent    PONV status at discharge: No PONV  Anesthetic complications: no      Cardiovascular status: blood pressure returned to baseline  Respiratory status: unassisted, spontaneous ventilation and room air  Hydration status: euvolemic  Follow-up not needed.          Vitals Value Taken Time   /66 11/30/22 2015   Temp 36.9 11/30/22 2139   Pulse 77 11/30/22 2015   Resp 22 11/30/22 2015   SpO2 95 % 11/30/22 2015   Vitals shown include unvalidated device data.      Event Time   Out of Recovery 11/30/2022 20:00:00         Pain/Anitha Score: Pain Rating Prior to Med Admin: 0 (11/30/2022  8:37 AM)  Pain Rating Post Med Admin: 0 (11/30/2022  6:01 AM)  Anitha Score: 10 (11/30/2022  8:00 PM)

## 2022-12-01 NOTE — OP NOTE
DATE OF PROCEDURE: 11/30/2022    PRE OP DIAGNOSIS: Achalasia [K22.0]  Incomplete mucosotomy closure    POST OP DIAGNOSIS: Achalasia [K22.0]  Incomplete mucosotomy closure    PROCEDURE: Procedure(s) (LRB):  EGD (ESOPHAGOGASTRODUODENOSCOPY) with musocal clip application (N/A)  INSERTION - MUCOSAL CLIP    Surgeon(s) and Role:     * Dianne Aguirre MD - Primary     * Juany Bentley MD - Resident - Assisting    ANESTHESIA: General    INDICATION: Patient is a 72 year-old male who underwent POEM on 11/29/22 for type 2 achalasia. UGI on POD1 revealed contrast within the submucosal dissection plan indicating an incomplete mucosotomy closure. Return to OR for evaluation and closure was indicated. Informed consent obtained.     FINDINGS:  1. Dilated esophagus with mucosal atrophy, now empty following POEM, with wide-open LES.  2. No obvious visualization of where mucosotomy was not approximated.  3. Removal of all prior clips and replacement with 9 Revolution 360 clips for redo mucosotomy.     DESCRIPTION OF PROCEDURE: Patient was met in the pre-op area and his identity and consent confirmed. He was then brought back to the Operating Room and placed in the supine position. General anesthesia was induced and he was intubated without incident. SCDs and a warming blanket were placed. A pre-procedural time-out was performed by all members of the surgical and anesthesia teams. A bite block was placed. The endoscope was placed into the oropharynx and into the esophagus where the previously placed clips were easily visualized. No obvious opening of the mucosotomy was noted. Further inspection of the esophagus demonstrated no other areas of injury or perforation. Notable the esophagus was now empty, a significant change from yesterday's procedure. The LES was widely patent and easily traversed. The stomach appeared normal. The previously placed mucosal clips were removed and pushed into the stomach. The mucosotomy  was intubated and the submucosal tract explored. There were not abnormalities and no residual contrast. The mucostomy was then closed again with 9 Revolution 360 endoscopic clips. The air in the stomach was aspirated. The patient was extubated without incident and brought to the PACU in good condition having tolerated the operation well. Surgical sponge and instrument count was correct at the end of the case.     EBL: <1 mL  Specimens: None  Drains: None  Complications: None apparent  Dispo: Surgical floor, strict NPO     I was present and scrubbed for the entire procedure.     Dianne Aguirre  12/1/2022

## 2022-12-01 NOTE — NURSING TRANSFER
Nursing Transfer Note      11/30/2022     Reason patient is being transferred: post op    Transfer To: 522    Transfer via stretcher    Transfer with chart    Transported by pct    Medicines sent: routine    Any special needs or follow-up needed: routine    Chart send with patient: Yes    Notified: family    Patient reassessed at: 2000

## 2022-12-02 LAB
ALBUMIN SERPL BCP-MCNC: 2.6 G/DL (ref 3.5–5.2)
ALP SERPL-CCNC: 60 U/L (ref 55–135)
ALT SERPL W/O P-5'-P-CCNC: 9 U/L (ref 10–44)
ANION GAP SERPL CALC-SCNC: 10 MMOL/L (ref 8–16)
AST SERPL-CCNC: 17 U/L (ref 10–40)
BASOPHILS # BLD AUTO: 0.03 K/UL (ref 0–0.2)
BASOPHILS NFR BLD: 0.3 % (ref 0–1.9)
BILIRUB SERPL-MCNC: 1.2 MG/DL (ref 0.1–1)
BUN SERPL-MCNC: 11 MG/DL (ref 8–23)
CALCIUM SERPL-MCNC: 8.8 MG/DL (ref 8.7–10.5)
CHLORIDE SERPL-SCNC: 103 MMOL/L (ref 95–110)
CO2 SERPL-SCNC: 19 MMOL/L (ref 23–29)
CREAT SERPL-MCNC: 0.8 MG/DL (ref 0.5–1.4)
DIFFERENTIAL METHOD: ABNORMAL
EOSINOPHIL # BLD AUTO: 0.2 K/UL (ref 0–0.5)
EOSINOPHIL NFR BLD: 1.4 % (ref 0–8)
ERYTHROCYTE [DISTWIDTH] IN BLOOD BY AUTOMATED COUNT: 14.4 % (ref 11.5–14.5)
EST. GFR  (NO RACE VARIABLE): >60 ML/MIN/1.73 M^2
GLUCOSE SERPL-MCNC: 70 MG/DL (ref 70–110)
HCT VFR BLD AUTO: 34.5 % (ref 40–54)
HGB BLD-MCNC: 11.5 G/DL (ref 14–18)
IMM GRANULOCYTES # BLD AUTO: 0.03 K/UL (ref 0–0.04)
IMM GRANULOCYTES NFR BLD AUTO: 0.3 % (ref 0–0.5)
LYMPHOCYTES # BLD AUTO: 1.4 K/UL (ref 1–4.8)
LYMPHOCYTES NFR BLD: 11.8 % (ref 18–48)
MAGNESIUM SERPL-MCNC: 1.6 MG/DL (ref 1.6–2.6)
MCH RBC QN AUTO: 29.4 PG (ref 27–31)
MCHC RBC AUTO-ENTMCNC: 33.3 G/DL (ref 32–36)
MCV RBC AUTO: 88 FL (ref 82–98)
MONOCYTES # BLD AUTO: 1.1 K/UL (ref 0.3–1)
MONOCYTES NFR BLD: 9.4 % (ref 4–15)
NEUTROPHILS # BLD AUTO: 9 K/UL (ref 1.8–7.7)
NEUTROPHILS NFR BLD: 76.8 % (ref 38–73)
NRBC BLD-RTO: 0 /100 WBC
PHOSPHATE SERPL-MCNC: 2.9 MG/DL (ref 2.7–4.5)
PLATELET # BLD AUTO: 230 K/UL (ref 150–450)
PMV BLD AUTO: 10.4 FL (ref 9.2–12.9)
POTASSIUM SERPL-SCNC: 4.4 MMOL/L (ref 3.5–5.1)
PROT SERPL-MCNC: 6.3 G/DL (ref 6–8.4)
RBC # BLD AUTO: 3.91 M/UL (ref 4.6–6.2)
SODIUM SERPL-SCNC: 132 MMOL/L (ref 136–145)
WBC # BLD AUTO: 11.65 K/UL (ref 3.9–12.7)

## 2022-12-02 PROCEDURE — 36415 COLL VENOUS BLD VENIPUNCTURE: CPT

## 2022-12-02 PROCEDURE — 11000001 HC ACUTE MED/SURG PRIVATE ROOM

## 2022-12-02 PROCEDURE — 25500020 PHARM REV CODE 255: Performed by: SURGERY

## 2022-12-02 PROCEDURE — 63600175 PHARM REV CODE 636 W HCPCS: Performed by: STUDENT IN AN ORGANIZED HEALTH CARE EDUCATION/TRAINING PROGRAM

## 2022-12-02 PROCEDURE — 85025 COMPLETE CBC W/AUTO DIFF WBC: CPT

## 2022-12-02 PROCEDURE — 63600175 PHARM REV CODE 636 W HCPCS: Performed by: SURGERY

## 2022-12-02 PROCEDURE — C9113 INJ PANTOPRAZOLE SODIUM, VIA: HCPCS | Performed by: SURGERY

## 2022-12-02 PROCEDURE — 80053 COMPREHEN METABOLIC PANEL: CPT

## 2022-12-02 PROCEDURE — 84100 ASSAY OF PHOSPHORUS: CPT

## 2022-12-02 PROCEDURE — 83735 ASSAY OF MAGNESIUM: CPT

## 2022-12-02 PROCEDURE — 63600175 PHARM REV CODE 636 W HCPCS

## 2022-12-02 PROCEDURE — 94761 N-INVAS EAR/PLS OXIMETRY MLT: CPT

## 2022-12-02 RX ADMIN — FLUCONAZOLE IN SODIUM CHLORIDE 800 MG: 2 INJECTION, SOLUTION INTRAVENOUS at 06:12

## 2022-12-02 RX ADMIN — ENOXAPARIN SODIUM 40 MG: 40 INJECTION SUBCUTANEOUS at 05:12

## 2022-12-02 RX ADMIN — AMPICILLIN AND SULBACTAM 1.5 G: 1; .5 INJECTION, POWDER, FOR SOLUTION INTRAMUSCULAR; INTRAVENOUS at 05:12

## 2022-12-02 RX ADMIN — IOHEXOL 90 ML: 350 INJECTION, SOLUTION INTRAVENOUS at 08:12

## 2022-12-02 RX ADMIN — PANTOPRAZOLE SODIUM 40 MG: 40 INJECTION, POWDER, FOR SOLUTION INTRAVENOUS at 10:12

## 2022-12-02 NOTE — SUBJECTIVE & OBJECTIVE
Interval History: Patient seen and examined this morning. Febrile to 101 overnight. Awaiting morning labs. No complaints. Planning for UGI this morning.     Medications:  Continuous Infusions:   lactated ringers 100 mL/hr at 12/01/22 1713     Scheduled Meds:   enoxaparin  40 mg Subcutaneous Daily    pantoprazole  40 mg Intravenous BID     PRN Meds:morphine, morphine     Review of patient's allergies indicates:  No Known Allergies  Objective:     Vital Signs (Most Recent):  Temp: 98.5 °F (36.9 °C) (12/02/22 0452)  Pulse: 75 (12/02/22 0452)  Resp: 18 (12/02/22 0452)  BP: (!) 119/56 (12/02/22 0452)  SpO2: 95 % (12/02/22 0452)   Vital Signs (24h Range):  Temp:  [98 °F (36.7 °C)-101.1 °F (38.4 °C)] 98.5 °F (36.9 °C)  Pulse:  [73-86] 75  Resp:  [16-18] 18  SpO2:  [95 %-97 %] 95 %  BP: (112-150)/(56-72) 119/56     Weight: 60.3 kg (133 lb)  Body mass index is 18.04 kg/m².    Intake/Output - Last 3 Shifts         11/30 0700  12/01 0659 12/01 0700  12/02 0659 12/02 0700  12/03 0659    P.O. 0      I.V. (mL/kg) 805.4 (13.4) 1200 (19.9)     IV Piggyback 800      Total Intake(mL/kg) 1605.4 (26.6) 1200 (19.9)     Urine (mL/kg/hr) 1050 (0.7) 600 (0.4)     Total Output 1050 600     Net +555.4 +600                    Physical Exam  Vitals and nursing note reviewed.   Constitutional:       General: He is not in acute distress.     Appearance: Normal appearance.   HENT:      Head: Normocephalic and atraumatic.      Nose: Nose normal.      Mouth/Throat:      Mouth: Mucous membranes are moist.   Cardiovascular:      Rate and Rhythm: Normal rate and regular rhythm.      Pulses: Normal pulses.   Pulmonary:      Effort: Pulmonary effort is normal. No respiratory distress.   Abdominal:      General: Abdomen is flat. There is no distension.      Palpations: Abdomen is soft.      Tenderness: There is no abdominal tenderness.   Musculoskeletal:         General: Normal range of motion.   Skin:     General: Skin is warm and dry.   Neurological:       Mental Status: He is alert.       Significant Labs:  I have reviewed all pertinent lab results within the past 24 hours.  CBC:   Recent Labs   Lab 12/01/22  0446   WBC 9.07   RBC 3.68*   HGB 10.7*   HCT 32.1*      MCV 87   MCH 29.1   MCHC 33.3     CMP:   Recent Labs   Lab 12/02/22  0544   GLU 70   CALCIUM 8.8   ALBUMIN 2.6*   PROT 6.3   *   K 4.4   CO2 19*      BUN 11   CREATININE 0.8   ALKPHOS 60   ALT 9*   AST 17   BILITOT 1.2*       Significant Diagnostics:  I have reviewed all pertinent imaging results/findings within the past 24 hours.

## 2022-12-02 NOTE — PLAN OF CARE
General Surgery:     Upper GI reviewed. Small anterior leak present. Orders placed for PICC placement; patient will remain NPO for one week and will start TPN for nutrition. One dose of fluconazole ordered + 3 days of Unasyn.    We will plan for a repeat upper GI study in one week to reassess.    Please call with any questions or concerns.     VALERY Teran MD  General Surgery, PGY1  [phone]

## 2022-12-02 NOTE — PLAN OF CARE
12/02/22 1353   Post-Acute Status   Post-Acute Authorization IV Infusion;Home Health   Home Health Status Referrals Sent   IV Infusion Status Referral(s) sent     SW faxed referral to [facility] and [facility]  via [facility] for review. SW will follow up as needed.        Terri Hercules LMSW  Case Management   [facility]   Ext. [phone]

## 2022-12-02 NOTE — ANESTHESIA POSTPROCEDURE EVALUATION
Anesthesia Post Evaluation    Patient: Jose Young    Procedure(s) Performed: Procedure(s) (LRB):  MYOTOMY, PERORAL, ENDOSCOPIC POEM (N/A)    Final Anesthesia Type: general      Patient location during evaluation: PACU  Patient participation: Yes- Able to Participate  Level of consciousness: awake and alert and oriented  Post-procedure vital signs: reviewed and stable  Pain management: adequate  Airway patency: patent    PONV status at discharge: No PONV  Anesthetic complications: no      Cardiovascular status: hemodynamically stable  Respiratory status: unassisted, spontaneous ventilation and room air  Hydration status: euvolemic  Follow-up not needed.          Vitals Value Taken Time   /56 12/02/22 0452   Temp 36.9 °C (98.5 °F) 12/02/22 0452   Pulse 75 12/02/22 0452   Resp 18 12/02/22 0452   SpO2 95 % 12/02/22 0452         Event Time   Out of Recovery 16:30:00         Pain/Anitha Score: Pain Rating Prior to Med Admin: 0 (12/1/2022  2:13 PM)  Pain Rating Post Med Admin: 5 (12/1/2022  6:17 AM)

## 2022-12-02 NOTE — ASSESSMENT & PLAN NOTE
Jose Young is a 72yoM with a history of achalasia who underwent a POEM on 11/29 and revision 11/30    - patient seen and examined  - febrile to 101 overnight  - morning labs pending; follow up WBC  - UGI this mornign to assess clip revision  - NPO until imaging study obtained  - pain meds via IV pushes-- plan to transition to PO regimen when appropriate  - dietician consulted  - out of bed, ambulate    Dispo: pending UGI study this morning

## 2022-12-02 NOTE — PROGRESS NOTES
[facility] - [facility]  General Surgery  Progress Note    Subjective:     History of Present Illness:  Patient is a 72 year-old male with HTN and HLD who presents for discussion of management for achalasia. He first developed dysphagia primarily to solids about 5 years ago which has slowly progressed in severity until the last 2 months, during which he's experienced daily dysphagia (solids>liquids), daily regurgitation, and overall 50lb weight loss. He denies chest pain, nausea, vomiting, retching, heartburn, or abdominal pain. His Eckardt score is 7/12.       Post-Op Info:  Procedure(s) (LRB):  EGD (ESOPHAGOGASTRODUODENOSCOPY) with musocal clip application (N/A)  INSERTION - MUCOSAL CLIP   2 Days Post-Op     Interval History: Patient seen and examined this morning. Febrile to 101 overnight. Awaiting morning labs. No complaints. Planning for UGI this morning.     Medications:  Continuous Infusions:   lactated ringers 100 mL/hr at 12/01/22 1713     Scheduled Meds:   enoxaparin  40 mg Subcutaneous Daily    pantoprazole  40 mg Intravenous BID     PRN Meds:morphine, morphine     Review of patient's allergies indicates:  No Known Allergies  Objective:     Vital Signs (Most Recent):  Temp: 98.5 °F (36.9 °C) (12/02/22 0452)  Pulse: 75 (12/02/22 0452)  Resp: 18 (12/02/22 0452)  BP: (!) 119/56 (12/02/22 0452)  SpO2: 95 % (12/02/22 0452)   Vital Signs (24h Range):  Temp:  [98 °F (36.7 °C)-101.1 °F (38.4 °C)] 98.5 °F (36.9 °C)  Pulse:  [73-86] 75  Resp:  [16-18] 18  SpO2:  [95 %-97 %] 95 %  BP: (112-150)/(56-72) 119/56     Weight: 60.3 kg (133 lb)  Body mass index is 18.04 kg/m².    Intake/Output - Last 3 Shifts         11/30 0700  12/01 0659 12/01 0700  12/02 0659 12/02 0700 12/03 0659    P.O. 0      I.V. (mL/kg) 805.4 (13.4) 1200 (19.9)     IV Piggyback 800      Total Intake(mL/kg) 1605.4 (26.6) 1200 (19.9)     Urine (mL/kg/hr) 1050 (0.7) 600 (0.4)     Total Output 1050 600     Net +555.4 +600                    Physical  Exam  Vitals and nursing note reviewed.   Constitutional:       General: He is not in acute distress.     Appearance: Normal appearance.   HENT:      Head: Normocephalic and atraumatic.      Nose: Nose normal.      Mouth/Throat:      Mouth: Mucous membranes are moist.   Cardiovascular:      Rate and Rhythm: Normal rate and regular rhythm.      Pulses: Normal pulses.   Pulmonary:      Effort: Pulmonary effort is normal. No respiratory distress.   Abdominal:      General: Abdomen is flat. There is no distension.      Palpations: Abdomen is soft.      Tenderness: There is no abdominal tenderness.   Musculoskeletal:         General: Normal range of motion.   Skin:     General: Skin is warm and dry.   Neurological:      Mental Status: He is alert.       Significant Labs:  I have reviewed all pertinent lab results within the past 24 hours.  CBC:   Recent Labs   Lab 12/01/22  0446   WBC 9.07   RBC 3.68*   HGB 10.7*   HCT 32.1*      MCV 87   MCH 29.1   MCHC 33.3     CMP:   Recent Labs   Lab 12/02/22  0544   GLU 70   CALCIUM 8.8   ALBUMIN 2.6*   PROT 6.3   *   K 4.4   CO2 19*      BUN 11   CREATININE 0.8   ALKPHOS 60   ALT 9*   AST 17   BILITOT 1.2*       Significant Diagnostics:  I have reviewed all pertinent imaging results/findings within the past 24 hours.    Assessment/Plan:     Andrealasicarlos Young is a 72yoM with a history of achalasia who underwent a POEM on 11/29 and revision 11/30    - patient seen and examined  - febrile to 101 overnight  - morning labs pending; follow up WBC  - UGI this mornign to assess clip revision  - NPO until imaging study obtained  - pain meds via IV pushes-- plan to transition to PO regimen when appropriate  - dietician consulted  - out of bed, ambulate    Dispo: pending UGI study this morning        Good Valiente MD  General Surgery  [facility] - Surgery

## 2022-12-02 NOTE — PLAN OF CARE
Dr. Aguirre reported to  that patient will d/c home with TPN x1 week. PICC team unable to place line today due to patient's recent fever. Plan for PICC insertion in am and patient to d/c home with HH and [facility]. SW to send referrals for post-acute care. Will continue to monitor.

## 2022-12-03 LAB
ALBUMIN SERPL BCP-MCNC: 2.5 G/DL (ref 3.5–5.2)
ALP SERPL-CCNC: 54 U/L (ref 55–135)
ALT SERPL W/O P-5'-P-CCNC: 8 U/L (ref 10–44)
ANION GAP SERPL CALC-SCNC: 15 MMOL/L (ref 8–16)
AST SERPL-CCNC: 17 U/L (ref 10–40)
BASOPHILS # BLD AUTO: 0.03 K/UL (ref 0–0.2)
BASOPHILS NFR BLD: 0.3 % (ref 0–1.9)
BILIRUB SERPL-MCNC: 0.8 MG/DL (ref 0.1–1)
BUN SERPL-MCNC: 10 MG/DL (ref 8–23)
CALCIUM SERPL-MCNC: 8.6 MG/DL (ref 8.7–10.5)
CHLORIDE SERPL-SCNC: 104 MMOL/L (ref 95–110)
CO2 SERPL-SCNC: 15 MMOL/L (ref 23–29)
CREAT SERPL-MCNC: 0.8 MG/DL (ref 0.5–1.4)
DIFFERENTIAL METHOD: ABNORMAL
EOSINOPHIL # BLD AUTO: 0.2 K/UL (ref 0–0.5)
EOSINOPHIL NFR BLD: 2.5 % (ref 0–8)
ERYTHROCYTE [DISTWIDTH] IN BLOOD BY AUTOMATED COUNT: 14.4 % (ref 11.5–14.5)
EST. GFR  (NO RACE VARIABLE): >60 ML/MIN/1.73 M^2
GLUCOSE SERPL-MCNC: 61 MG/DL (ref 70–110)
HCT VFR BLD AUTO: 34.1 % (ref 40–54)
HGB BLD-MCNC: 11.2 G/DL (ref 14–18)
IMM GRANULOCYTES # BLD AUTO: 0.01 K/UL (ref 0–0.04)
IMM GRANULOCYTES NFR BLD AUTO: 0.1 % (ref 0–0.5)
LYMPHOCYTES # BLD AUTO: 1.5 K/UL (ref 1–4.8)
LYMPHOCYTES NFR BLD: 16 % (ref 18–48)
MAGNESIUM SERPL-MCNC: 1.5 MG/DL (ref 1.6–2.6)
MCH RBC QN AUTO: 29.6 PG (ref 27–31)
MCHC RBC AUTO-ENTMCNC: 32.8 G/DL (ref 32–36)
MCV RBC AUTO: 90 FL (ref 82–98)
MONOCYTES # BLD AUTO: 0.7 K/UL (ref 0.3–1)
MONOCYTES NFR BLD: 7.7 % (ref 4–15)
NEUTROPHILS # BLD AUTO: 7 K/UL (ref 1.8–7.7)
NEUTROPHILS NFR BLD: 73.4 % (ref 38–73)
NRBC BLD-RTO: 0 /100 WBC
PHOSPHATE SERPL-MCNC: 2.6 MG/DL (ref 2.7–4.5)
PLATELET # BLD AUTO: 227 K/UL (ref 150–450)
PMV BLD AUTO: 10 FL (ref 9.2–12.9)
POTASSIUM SERPL-SCNC: 4.3 MMOL/L (ref 3.5–5.1)
PROT SERPL-MCNC: 6.1 G/DL (ref 6–8.4)
RBC # BLD AUTO: 3.78 M/UL (ref 4.6–6.2)
SODIUM SERPL-SCNC: 134 MMOL/L (ref 136–145)
WBC # BLD AUTO: 9.6 K/UL (ref 3.9–12.7)

## 2022-12-03 PROCEDURE — 11000001 HC ACUTE MED/SURG PRIVATE ROOM

## 2022-12-03 PROCEDURE — 36573 INSJ PICC RS&I 5 YR+: CPT

## 2022-12-03 PROCEDURE — C1751 CATH, INF, PER/CENT/MIDLINE: HCPCS

## 2022-12-03 PROCEDURE — A4216 STERILE WATER/SALINE, 10 ML: HCPCS | Performed by: SURGERY

## 2022-12-03 PROCEDURE — 25000003 PHARM REV CODE 250: Performed by: STUDENT IN AN ORGANIZED HEALTH CARE EDUCATION/TRAINING PROGRAM

## 2022-12-03 PROCEDURE — 85025 COMPLETE CBC W/AUTO DIFF WBC: CPT

## 2022-12-03 PROCEDURE — 63600175 PHARM REV CODE 636 W HCPCS: Performed by: SURGERY

## 2022-12-03 PROCEDURE — 84100 ASSAY OF PHOSPHORUS: CPT

## 2022-12-03 PROCEDURE — 80053 COMPREHEN METABOLIC PANEL: CPT

## 2022-12-03 PROCEDURE — 36415 COLL VENOUS BLD VENIPUNCTURE: CPT

## 2022-12-03 PROCEDURE — 25000003 PHARM REV CODE 250: Performed by: SURGERY

## 2022-12-03 PROCEDURE — 76937 US GUIDE VASCULAR ACCESS: CPT

## 2022-12-03 PROCEDURE — C9113 INJ PANTOPRAZOLE SODIUM, VIA: HCPCS | Performed by: SURGERY

## 2022-12-03 PROCEDURE — 63600175 PHARM REV CODE 636 W HCPCS: Performed by: STUDENT IN AN ORGANIZED HEALTH CARE EDUCATION/TRAINING PROGRAM

## 2022-12-03 PROCEDURE — 63600175 PHARM REV CODE 636 W HCPCS

## 2022-12-03 PROCEDURE — A4217 STERILE WATER/SALINE, 500 ML: HCPCS | Performed by: STUDENT IN AN ORGANIZED HEALTH CARE EDUCATION/TRAINING PROGRAM

## 2022-12-03 PROCEDURE — 83735 ASSAY OF MAGNESIUM: CPT

## 2022-12-03 RX ORDER — SODIUM CHLORIDE 0.9 % (FLUSH) 0.9 %
10 SYRINGE (ML) INJECTION EVERY 6 HOURS
Status: DISCONTINUED | OUTPATIENT
Start: 2022-12-03 | End: 2022-12-04 | Stop reason: HOSPADM

## 2022-12-03 RX ORDER — MAGNESIUM SULFATE HEPTAHYDRATE 40 MG/ML
4 INJECTION, SOLUTION INTRAVENOUS ONCE
Status: COMPLETED | OUTPATIENT
Start: 2022-12-03 | End: 2022-12-03

## 2022-12-03 RX ORDER — SODIUM CHLORIDE 0.9 % (FLUSH) 0.9 %
10 SYRINGE (ML) INJECTION
Status: DISCONTINUED | OUTPATIENT
Start: 2022-12-03 | End: 2022-12-04 | Stop reason: HOSPADM

## 2022-12-03 RX ADMIN — PANTOPRAZOLE SODIUM 40 MG: 40 INJECTION, POWDER, FOR SOLUTION INTRAVENOUS at 08:12

## 2022-12-03 RX ADMIN — AMPICILLIN AND SULBACTAM 1.5 G: 1; .5 INJECTION, POWDER, FOR SOLUTION INTRAMUSCULAR; INTRAVENOUS at 08:12

## 2022-12-03 RX ADMIN — Medication 10 ML: at 05:12

## 2022-12-03 RX ADMIN — AMPICILLIN AND SULBACTAM 1.5 G: 1; .5 INJECTION, POWDER, FOR SOLUTION INTRAMUSCULAR; INTRAVENOUS at 02:12

## 2022-12-03 RX ADMIN — ENOXAPARIN SODIUM 40 MG: 40 INJECTION SUBCUTANEOUS at 05:12

## 2022-12-03 RX ADMIN — SODIUM PHOSPHATE, MONOBASIC, MONOHYDRATE AND SODIUM PHOSPHATE, DIBASIC, ANHYDROUS 39.99 MMOL: 142; 276 INJECTION, SOLUTION INTRAVENOUS at 12:12

## 2022-12-03 RX ADMIN — SODIUM CHLORIDE, POTASSIUM CHLORIDE, SODIUM LACTATE AND CALCIUM CHLORIDE: 600; 310; 30; 20 INJECTION, SOLUTION INTRAVENOUS at 12:12

## 2022-12-03 RX ADMIN — MAGNESIUM SULFATE 4 G: 2 INJECTION INTRAVENOUS at 10:12

## 2022-12-03 RX ADMIN — MAGNESIUM SULFATE HEPTAHYDRATE: 500 INJECTION, SOLUTION INTRAMUSCULAR; INTRAVENOUS at 09:12

## 2022-12-03 RX ADMIN — Medication 10 ML: at 12:12

## 2022-12-03 NOTE — ASSESSMENT & PLAN NOTE
Jose Young is a 72yoM with a history of achalasia who underwent a POEM on 11/29 and revision 11/30    - patient seen and examined  - afebrile, hemodynamics stable  - morning labs within normal limits  - UGI on 12/2 demonstrates small, persistent leak  - strict NPO  - PICC line insertion  - TPN ordered  - pain meds via IV pushes-- plan to transition to PO regimen when appropriate  - dietician following  - out of bed, ambulate    Dispo: NPO, PICC line, TPN initiation

## 2022-12-03 NOTE — PLAN OF CARE
On call CM contacted by bedside nurse.  Bedside nurse states that patient is inquiring when he will be connected to his TPN, either here or at home.  CM read md progress note and is unsure of discharge date.  [facility] to [facility] had been sent.  MD note states that patient has to transition from iv pain meds to oral.  CM requested bedside nurse reach out to md to find out anticipated discharge date.  CM in communication with Monica with [facility] who states she was under the understanding patient discharge would be next week.  CM will wait to hear back from bedside nurse with regard to discharge date.

## 2022-12-03 NOTE — CONSULTS
OTILIO placed double lumen PICC to right brachial vein using u/s guidance.  39 cm in length, 27 cm arm circumference and 0 cm exposed.   Lot # [id number].    PICC inserted for TPN

## 2022-12-03 NOTE — SUBJECTIVE & OBJECTIVE
Interval History: Patient seen and examined. No acute events overnight. Afebrile. UGI on 12/2 demonstrates small, persistent leak. Plan for PICC line placement, initiate TPN. Plan of care discussed with the patient and daughter at bedside.    Medications:  Continuous Infusions:   lactated ringers 100 mL/hr at 12/01/22 1713    Standard Custom Day One ADULT TPN for patient with NO electrolyte abnormality and NO renal dysfunction ([facility]ENTRAL)       Scheduled Meds:   ampicillin-sulbactim (UNASYN) IVPB  1.5 g Intravenous Q6H    enoxaparin  40 mg Subcutaneous Daily    fat emulsion 20%  250 mL Intravenous Daily    magnesium sulfate IVPB  4 g Intravenous Once    pantoprazole  40 mg Intravenous BID    sodium phosphate IVPB  39.99 mmol Intravenous Once     PRN Meds:morphine, morphine     Review of patient's allergies indicates:  No Known Allergies  Objective:     Vital Signs (Most Recent):  Temp: 98.2 °F (36.8 °C) (12/03/22 0733)  Pulse: 75 (12/03/22 0733)  Resp: 18 (12/03/22 0733)  BP: (!) 143/74 (12/03/22 0733)  SpO2: 97 % (12/03/22 0733)   Vital Signs (24h Range):  Temp:  [97.5 °F (36.4 °C)-98.7 °F (37.1 °C)] 98.2 °F (36.8 °C)  Pulse:  [65-75] 75  Resp:  [17-20] 18  SpO2:  [96 %-100 %] 97 %  BP: (135-149)/(67-75) 143/74     Weight: 60.3 kg (133 lb)  Body mass index is 18.04 kg/m².    Intake/Output - Last 3 Shifts         12/01 0700 12/02 0659 12/02 0700 12/03 0659 12/03 0700 12/04 0659    P.O.       I.V. (mL/kg) 1200 (19.9)      IV Piggyback       Total Intake(mL/kg) 1200 (19.9)      Urine (mL/kg/hr) 600 (0.4) 300 (0.2)     Total Output 600 300     Net +600 -300                    Physical Exam  Vitals and nursing note reviewed.   Constitutional:       General: He is not in acute distress.     Appearance: Normal appearance.   HENT:      Head: Normocephalic and atraumatic.      Nose: Nose normal.      Mouth/Throat:      Mouth: Mucous membranes are moist.   Cardiovascular:      Rate and Rhythm: Normal rate and regular  rhythm.      Pulses: Normal pulses.   Pulmonary:      Effort: Pulmonary effort is normal. No respiratory distress.   Abdominal:      General: Abdomen is flat. There is no distension.      Palpations: Abdomen is soft.      Tenderness: There is no abdominal tenderness.   Musculoskeletal:         General: Normal range of motion.   Skin:     General: Skin is warm and dry.   Neurological:      Mental Status: He is alert.       Significant Labs:  I have reviewed all pertinent lab results within the past 24 hours.  CBC:   Recent Labs   Lab 12/03/22  0543   WBC 9.60   RBC 3.78*   HGB 11.2*   HCT 34.1*      MCV 90   MCH 29.6   MCHC 32.8     CMP:   Recent Labs   Lab 12/03/22  0543   GLU 61*   CALCIUM 8.6*   ALBUMIN 2.5*   PROT 6.1   *   K 4.3   CO2 15*      BUN 10   CREATININE 0.8   ALKPHOS 54*   ALT 8*   AST 17   BILITOT 0.8       Significant Diagnostics:  I have reviewed all pertinent imaging results/findings within the past 24 hours.

## 2022-12-03 NOTE — PROCEDURES
Jose Young is a 72 y.o. male patient.    Temp: 98.2 °F (36.8 °C) (12/03/22 0733)  Pulse: 75 (12/03/22 0733)  Resp: 18 (12/03/22 0733)  BP: (!) 143/74 (12/03/22 0733)  SpO2: 97 % (12/03/22 0733)  Weight: 60.3 kg (133 lb) (11/29/22 0958)  Height: 6' (182.9 cm) (11/29/22 0958)    PICC  Date/Time: 12/3/2022 9:49 AM  Performed by: Jose Demarco RN  Assisting provider: Mary Jane Hammonds RN  Consent Done: Yes  Time out: Immediately prior to procedure a time out was called to verify the correct patient, procedure, equipment, support staff and site/side marked as required  Indications: med administration and vascular access  Anesthesia: local infiltration  Local anesthetic: lidocaine 1% without epinephrine  Anesthetic Total (mL): 3  Preparation: skin prepped with ChloraPrep  Skin prep agent dried: skin prep agent completely dried prior to procedure  Sterile barriers: all five maximum sterile barriers used - cap, mask, sterile gown, sterile gloves, and large sterile sheet  Hand hygiene: hand hygiene performed prior to central venous catheter insertion  Location details: right brachial  Catheter type: double lumen  Catheter size: 5 Fr  Catheter Length: 39cm    Ultrasound guidance: yes  Vessel Caliber: large and patent, compressibility normal  Vascular Doppler: not done  Needle advanced into vessel with real time Ultrasound guidance.  Guidewire confirmed in vessel.  Image recorded and saved.  Sterile sheath used.  Number of attempts: 1  Post-procedure: blood return through all ports, chlorhexidine patch and sterile dressing applied  Technical procedures used: 3CG  Specimens: No  Implants: No  Assessment: placement verified by x-ray  Complications: none        Name JANETH Hammonds RN  12/3/2022

## 2022-12-03 NOTE — PROGRESS NOTES
[facility] - [facility]  General Surgery  Progress Note    Subjective:     History of Present Illness:  Patient is a 72 year-old male with HTN and HLD who presents for discussion of management for achalasia. He first developed dysphagia primarily to solids about 5 years ago which has slowly progressed in severity until the last 2 months, during which he's experienced daily dysphagia (solids>liquids), daily regurgitation, and overall 50lb weight loss. He denies chest pain, nausea, vomiting, retching, heartburn, or abdominal pain. His Eckardt score is 7/12.       Post-Op Info:  Procedure(s) (LRB):  EGD (ESOPHAGOGASTRODUODENOSCOPY) with musocal clip application (N/A)  INSERTION - MUCOSAL CLIP   3 Days Post-Op     Interval History: Patient seen and examined. No acute events overnight. Afebrile. UGI on 12/2 demonstrates small, persistent leak. Plan for PICC line placement, initiate TPN. Plan of care discussed with the patient and daughter at bedside.    Medications:  Continuous Infusions:   lactated ringers 100 mL/hr at 12/01/22 1713    Standard Custom Day One ADULT TPN for patient with NO electrolyte abnormality and NO renal dysfunction ([facility])       Scheduled Meds:   ampicillin-sulbactim (UNASYN) IVPB  1.5 g Intravenous Q6H    enoxaparin  40 mg Subcutaneous Daily    fat emulsion 20%  250 mL Intravenous Daily    magnesium sulfate IVPB  4 g Intravenous Once    pantoprazole  40 mg Intravenous BID    sodium phosphate IVPB  39.99 mmol Intravenous Once     PRN Meds:morphine, morphine     Review of patient's allergies indicates:  No Known Allergies  Objective:     Vital Signs (Most Recent):  Temp: 98.2 °F (36.8 °C) (12/03/22 0733)  Pulse: 75 (12/03/22 0733)  Resp: 18 (12/03/22 0733)  BP: (!) 143/74 (12/03/22 0733)  SpO2: 97 % (12/03/22 0733)   Vital Signs (24h Range):  Temp:  [97.5 °F (36.4 °C)-98.7 °F (37.1 °C)] 98.2 °F (36.8 °C)  Pulse:  [65-75] 75  Resp:  [17-20] 18  SpO2:  [96 %-100 %] 97 %  BP: (135-149)/(67-75)  143/74     Weight: 60.3 kg (133 lb)  Body mass index is 18.04 kg/m².    Intake/Output - Last 3 Shifts         12/01 0700  12/02 0659 12/02 0700 12/03 0659 12/03 0700  12/04 0659    P.O.       I.V. (mL/kg) 1200 (19.9)      IV Piggyback       Total Intake(mL/kg) 1200 (19.9)      Urine (mL/kg/hr) 600 (0.4) 300 (0.2)     Total Output 600 300     Net +600 -300                    Physical Exam  Vitals and nursing note reviewed.   Constitutional:       General: He is not in acute distress.     Appearance: Normal appearance.   HENT:      Head: Normocephalic and atraumatic.      Nose: Nose normal.      Mouth/Throat:      Mouth: Mucous membranes are moist.   Cardiovascular:      Rate and Rhythm: Normal rate and regular rhythm.      Pulses: Normal pulses.   Pulmonary:      Effort: Pulmonary effort is normal. No respiratory distress.   Abdominal:      General: Abdomen is flat. There is no distension.      Palpations: Abdomen is soft.      Tenderness: There is no abdominal tenderness.   Musculoskeletal:         General: Normal range of motion.   Skin:     General: Skin is warm and dry.   Neurological:      Mental Status: He is alert.       Significant Labs:  I have reviewed all pertinent lab results within the past 24 hours.  CBC:   Recent Labs   Lab 12/03/22  0543   WBC 9.60   RBC 3.78*   HGB 11.2*   HCT 34.1*      MCV 90   MCH 29.6   MCHC 32.8     CMP:   Recent Labs   Lab 12/03/22  0543   GLU 61*   CALCIUM 8.6*   ALBUMIN 2.5*   PROT 6.1   *   K 4.3   CO2 15*      BUN 10   CREATININE 0.8   ALKPHOS 54*   ALT 8*   AST 17   BILITOT 0.8       Significant Diagnostics:  I have reviewed all pertinent imaging results/findings within the past 24 hours.    Assessment/Plan:     * Achalasia  Jose Young is a 72yoM with a history of achalasia who underwent a POEM on 11/29 and revision 11/30    - patient seen and examined  - afebrile, hemodynamics stable  - morning labs within normal limits  - UGI on 12/2 demonstrates  small, persistent leak  - strict NPO  - PICC line insertion  - TPN ordered  - pain meds via IV pushes-- plan to transition to PO regimen when appropriate  - dietician following  - out of bed, ambulate    Dispo: NPO, PICC line, TPN initiation        Good Valiente MD  General Surgery  [facility] - Surgery

## 2022-12-03 NOTE — PLAN OF CARE
Problem: Adult Inpatient Plan of Care  Goal: Plan of Care Review  Outcome: Ongoing, Progressing  Flowsheets (Taken 12/2/2022 1947)  Plan of Care Reviewed With: patient     Problem: Skin Injury Risk Increased  Goal: Skin Health and Integrity  Outcome: Ongoing, Progressing  Intervention: Optimize Skin Protection  Flowsheets (Taken 12/2/2022 1947)  Pressure Reduction Techniques:   weight shift assistance provided   frequent weight shift encouraged  Pressure Reduction Devices: positioning supports utilized  Head of Bed (HOB) Positioning: HOB elevated  Intervention: Promote and Optimize Oral Intake  12/2/2022 1949 by Melina Frazier RN  Flowsheets (Taken 12/2/2022 1949)  Oral Nutrition Promotion:   physical activity promoted   social interaction promoted  12/2/2022 1947 by Melina Frazier RN  Flowsheets (Taken 12/2/2022 1947)  Oral Nutrition Promotion: physical activity promoted     Problem: Oral Intake Inadequate  Goal: Improved Oral Intake  Outcome: Ongoing, Progressing  Intervention: Promote and Optimize Oral Intake  12/2/2022 1949 by Melina Frazier RN  Flowsheets (Taken 12/2/2022 1949)  Oral Nutrition Promotion:   physical activity promoted   social interaction promoted  12/2/2022 1947 by Melina Frazier RN  Flowsheets (Taken 12/2/2022 1947)  Oral Nutrition Promotion: physical activity promoted

## 2022-12-04 VITALS
SYSTOLIC BLOOD PRESSURE: 162 MMHG | HEART RATE: 66 BPM | TEMPERATURE: 98 F | DIASTOLIC BLOOD PRESSURE: 77 MMHG | BODY MASS INDEX: 18.01 KG/M2 | RESPIRATION RATE: 16 BRPM | WEIGHT: 133 LBS | OXYGEN SATURATION: 99 % | HEIGHT: 72 IN

## 2022-12-04 LAB
ALBUMIN SERPL BCP-MCNC: 2.5 G/DL (ref 3.5–5.2)
ALP SERPL-CCNC: 47 U/L (ref 55–135)
ALT SERPL W/O P-5'-P-CCNC: 7 U/L (ref 10–44)
ANION GAP SERPL CALC-SCNC: 8 MMOL/L (ref 8–16)
AST SERPL-CCNC: 15 U/L (ref 10–40)
BASOPHILS # BLD AUTO: 0.02 K/UL (ref 0–0.2)
BASOPHILS NFR BLD: 0.3 % (ref 0–1.9)
BILIRUB SERPL-MCNC: 0.5 MG/DL (ref 0.1–1)
BUN SERPL-MCNC: 5 MG/DL (ref 8–23)
CALCIUM SERPL-MCNC: 8.4 MG/DL (ref 8.7–10.5)
CHLORIDE SERPL-SCNC: 105 MMOL/L (ref 95–110)
CO2 SERPL-SCNC: 23 MMOL/L (ref 23–29)
CREAT SERPL-MCNC: 0.8 MG/DL (ref 0.5–1.4)
DIFFERENTIAL METHOD: ABNORMAL
EOSINOPHIL # BLD AUTO: 0.2 K/UL (ref 0–0.5)
EOSINOPHIL NFR BLD: 3 % (ref 0–8)
ERYTHROCYTE [DISTWIDTH] IN BLOOD BY AUTOMATED COUNT: 14.1 % (ref 11.5–14.5)
EST. GFR  (NO RACE VARIABLE): >60 ML/MIN/1.73 M^2
GLUCOSE SERPL-MCNC: 127 MG/DL (ref 70–110)
HCT VFR BLD AUTO: 33.6 % (ref 40–54)
HGB BLD-MCNC: 11.5 G/DL (ref 14–18)
IMM GRANULOCYTES # BLD AUTO: 0.02 K/UL (ref 0–0.04)
IMM GRANULOCYTES NFR BLD AUTO: 0.3 % (ref 0–0.5)
LYMPHOCYTES # BLD AUTO: 1.1 K/UL (ref 1–4.8)
LYMPHOCYTES NFR BLD: 17 % (ref 18–48)
MAGNESIUM SERPL-MCNC: 1.7 MG/DL (ref 1.6–2.6)
MCH RBC QN AUTO: 29.7 PG (ref 27–31)
MCHC RBC AUTO-ENTMCNC: 34.2 G/DL (ref 32–36)
MCV RBC AUTO: 87 FL (ref 82–98)
MONOCYTES # BLD AUTO: 0.5 K/UL (ref 0.3–1)
MONOCYTES NFR BLD: 7.6 % (ref 4–15)
NEUTROPHILS # BLD AUTO: 4.7 K/UL (ref 1.8–7.7)
NEUTROPHILS NFR BLD: 71.8 % (ref 38–73)
NRBC BLD-RTO: 0 /100 WBC
PHOSPHATE SERPL-MCNC: 2 MG/DL (ref 2.7–4.5)
PLATELET # BLD AUTO: 227 K/UL (ref 150–450)
PMV BLD AUTO: 9.6 FL (ref 9.2–12.9)
POTASSIUM SERPL-SCNC: 3.7 MMOL/L (ref 3.5–5.1)
PROT SERPL-MCNC: 6.4 G/DL (ref 6–8.4)
RBC # BLD AUTO: 3.87 M/UL (ref 4.6–6.2)
SODIUM SERPL-SCNC: 136 MMOL/L (ref 136–145)
WBC # BLD AUTO: 6.6 K/UL (ref 3.9–12.7)

## 2022-12-04 PROCEDURE — 85025 COMPLETE CBC W/AUTO DIFF WBC: CPT

## 2022-12-04 PROCEDURE — 25000003 PHARM REV CODE 250: Performed by: STUDENT IN AN ORGANIZED HEALTH CARE EDUCATION/TRAINING PROGRAM

## 2022-12-04 PROCEDURE — 63600175 PHARM REV CODE 636 W HCPCS: Performed by: STUDENT IN AN ORGANIZED HEALTH CARE EDUCATION/TRAINING PROGRAM

## 2022-12-04 PROCEDURE — 63600175 PHARM REV CODE 636 W HCPCS: Performed by: SURGERY

## 2022-12-04 PROCEDURE — B4185 PARENTERAL SOL 10 GM LIPIDS: HCPCS | Performed by: STUDENT IN AN ORGANIZED HEALTH CARE EDUCATION/TRAINING PROGRAM

## 2022-12-04 PROCEDURE — 25000003 PHARM REV CODE 250: Performed by: SURGERY

## 2022-12-04 PROCEDURE — 80053 COMPREHEN METABOLIC PANEL: CPT

## 2022-12-04 PROCEDURE — 84100 ASSAY OF PHOSPHORUS: CPT

## 2022-12-04 PROCEDURE — 83735 ASSAY OF MAGNESIUM: CPT

## 2022-12-04 PROCEDURE — A4216 STERILE WATER/SALINE, 10 ML: HCPCS | Performed by: SURGERY

## 2022-12-04 PROCEDURE — C9113 INJ PANTOPRAZOLE SODIUM, VIA: HCPCS | Performed by: SURGERY

## 2022-12-04 RX ADMIN — Medication 10 ML: at 08:12

## 2022-12-04 RX ADMIN — SOYBEAN OIL 250 ML: 20 INJECTION, SOLUTION INTRAVENOUS at 01:12

## 2022-12-04 RX ADMIN — AMPICILLIN AND SULBACTAM 1.5 G: 1; .5 INJECTION, POWDER, FOR SOLUTION INTRAMUSCULAR; INTRAVENOUS at 01:12

## 2022-12-04 RX ADMIN — Medication 10 ML: at 01:12

## 2022-12-04 RX ADMIN — PANTOPRAZOLE SODIUM 40 MG: 40 INJECTION, POWDER, FOR SOLUTION INTRAVENOUS at 08:12

## 2022-12-04 RX ADMIN — AMPICILLIN AND SULBACTAM 1.5 G: 1; .5 INJECTION, POWDER, FOR SOLUTION INTRAMUSCULAR; INTRAVENOUS at 09:12

## 2022-12-04 NOTE — PLAN OF CARE
Problem: Adult Inpatient Plan of Care  Goal: Plan of Care Review  Outcome: Ongoing, Progressing  Flowsheets (Taken 12/3/2022 2005)  Plan of Care Reviewed With: patient     Problem: Oral Intake Inadequate  Goal: Improved Oral Intake  Outcome: Ongoing, Progressing  Intervention: Promote and Optimize Oral Intake  Flowsheets (Taken 12/3/2022 2005)  Oral Nutrition Promotion:   social interaction promoted   physical activity promoted     Problem: Infection  Goal: Absence of Infection Signs and Symptoms  Outcome: Ongoing, Progressing  Intervention: Prevent or Manage Infection  Flowsheets (Taken 12/3/2022 2005)  Fever Reduction/Comfort Measures:   lightweight clothing   lightweight bedding  Infection Management: aseptic technique maintained  Isolation Precautions: precautions maintained     Problem: Fall Injury Risk  Goal: Absence of Fall and Fall-Related Injury  Outcome: Ongoing, Progressing  Intervention: Identify and Manage Contributors  Flowsheets (Taken 12/3/2022 2006)  Self-Care Promotion:   BADL personal objects within reach   independence encouraged   BADL personal routines maintained  Medication Review/Management: medications reviewed  Intervention: Promote Injury-Free Environment  Flowsheets (Taken 12/3/2022 2006)  Safety Promotion/Fall Prevention:   assistive device/personal item within reach   Fall Risk reviewed with patient/family   Fall Risk signage in place   side rails raised x 3   instructed to call staff for mobility

## 2022-12-04 NOTE — PLAN OF CARE
Pt to d/c home on 12/4/22. [facility] and [facility] informed of d/c.     Louann Cisneros LCSW  Case Management/[facility]  [phone]      Was informed that pt will be staying with daughter at [address] [place], [id number]. [facility] informed of change. Will begin services on 12/5/22.     Louann Cisneros LCSW  Case Management/[facility]  [phone]

## 2022-12-04 NOTE — PLAN OF CARE
[facility]      HOME HEALTH ORDERS  FACE TO FACE ENCOUNTER    Patient Name: Jose Young  YOB: 1950    PCP: Malorie Madera DO   PCP Address: [address] [place] [address]  / [place] [address]  PCP Phone Number: [phone]  PCP Fax: [phone]    Encounter Date: 11/16/22    Admit to Home Health    Diagnoses:  Active Hospital Problems    Diagnosis  POA    *Achalasia [K22.0]  Yes      Resolved Hospital Problems   No resolved problems to display.       Follow Up Appointments:  Future Appointments   Date Time Provider Department Center   12/14/2022  1:00 PM Dianne Aguirre MD [facility] GENSUR [facility]   12/19/2022  8:20 AM Malorie Madera DO [facility] IM  Medical C       Allergies:Review of patient's allergies indicates:  No Known Allergies    Medications: Review discharge medications with patient and family and provide education.    Current Facility-Administered Medications   Medication Dose Route Frequency Provider Last Rate Last Admin    ampicillin-sulbactam 1.5 g in sodium chloride 0.9 % 100 mL IVPB (ready to mix system)  1.5 g Intravenous Q6H Good Valiente MD   Stopped at 12/04/22 0253    enoxaparin injection 40 mg  40 mg Subcutaneous Daily Maria Teresa Teran MD   40 mg at 12/03/22 1737    fat emulsion 20% infusion 250 mL  250 mL Intravenous Daily Good Valiente MD 20.8 mL/hr at 12/04/22 0146 250 mL at 12/04/22 0146    fat emulsion 20% infusion 250 mL  250 mL Intravenous Daily Good Valiente MD        lactated ringers infusion   Intravenous Continuous Maria Teresa Teran  mL/hr at 12/03/22 1242 New Bag at 12/03/22 1242    morphine injection 1 mg  1 mg Intravenous Q4H PRN Good Valiente MD   1 mg at 12/01/22 1313    pantoprazole injection 40 mg  40 mg Intravenous BID Dianne Aguirre MD   40 mg at 12/03/22 2053    sodium chloride 0.9% flush 10 mL  10 mL Intravenous Q6H Dianne Aguirre MD   10 mL at 12/04/22 0154    And    sodium chloride 0.9%  flush 10 mL  10 mL Intravenous PRN Dianne Aguirre MD        Standard Custom Day One ADULT TPN for patient with NO electrolyte abnormality and NO renal dysfunction (CENTRAL)   Intravenous Continuous Good Valiente MD 42 mL/hr at 12/03/22 2146 New Bag at 12/03/22 2146    Standard Custom Day One ADULT TPN for patient with NO electrolyte abnormality and NO renal dysfunction (CENTRAL)   Intravenous Continuous Good Valiente MD         Current Discharge Medication List        START taking these medications    Details   pantoprazole (PROTONIX) 40 MG tablet TAKE 1 TABLET (40 MG TOTAL) BY MOUTH ONCE DAILY. 30 MINUTES BEFORE ANY FOOD.  Qty: 30 tablet, Refills: 0    Associated Diagnoses: Gastric reflux           CONTINUE these medications which have NOT CHANGED    Details   ascorbic acid, vitamin C, (VITAMIN C) 250 MG tablet Take 250 mg by mouth once daily.      atorvastatin (LIPITOR) 40 MG tablet TAKE 1 TABLET(40 MG) BY MOUTH EVERY DAY  Qty: 30 tablet, Refills: 0    Associated Diagnoses: Hyperlipidemia, mixed      b complex vitamins tablet Take 1 tablet by mouth once daily.      cyproheptadine (PERIACTIN) 4 mg tablet Take 4 mg by mouth 3 (three) times daily. Pt needs refill      erythromycin (ROMYCIN) ophthalmic ointment APPLY A 3 MM RIBBON TO THE AFFECTED EYE AT NIGHT FOR 7 DAYS      hydroCHLOROthiazide (MICROZIDE) 12.5 mg capsule TAKE 1 CAPSULE(12.5 MG) BY MOUTH EVERY DAY  Qty: 30 capsule, Refills: 0    Associated Diagnoses: Hypertension, essential      Lactobacillus combination no.8 (ADULT PROBIOTIC ORAL) Take by mouth.      ofloxacin (OCUFLOX) 0.3 % ophthalmic solution INSTILL 1 DROP INTO THE RIGHT EYE FOUR TIMES DAILY FOR 7 DAYS      vitamin D (VITAMIN D3) 1000 units Tab Take 1,000 Units by mouth once daily.               I have seen and examined this patient within the last 30 days. My clinical findings that support the need for the home health skilled services and home bound status are the  following:no   Medical restrictions requiring assistance of another human to leave home due to  IV infusion Needs.     Diet:   other strict NPO    Labs:  SN to perform labs:  BMP: Biweekly; 1 week(s)    Referrals/ Consults  Aide to provide assistance with personal care, ADLs, and vital signs.    Activities:   activity as tolerated    Nursing:   Agency to admit patient within 24 hours of hospital discharge unless specified on physician order or at patient request    SN to complete comprehensive assessment including routine vital signs. Instruct on disease process and s/s of complications to report to MD. Review/verify medication list sent home with the patient at time of discharge  and instruct patient/caregiver as needed. Frequency may be adjusted depending on start of care date.     Skilled nurse to perform up to 3 visits PRN for symptoms related to diagnosis    Notify MD if SBP > 160 or < 90; DBP > 90 or < 50; HR > 120 or < 50; Temp > 101; O2 < 88%; Other:   N/A    Ok to schedule additional visits based on staff availability and patient request on consecutive days within the home health episode.    When multiple disciplines ordered:    Start of Care occurs on Sunday - Wednesday schedule remaining discipline evaluations as ordered on separate consecutive days following the start of care.    Thursday SOC -schedule subsequent evaluations Friday and Monday the following week.     Friday - Saturday SOC - schedule subsequent discipline evaluations on consecutive days starting Monday of the following week.    For all post-discharge communication and subsequent orders please contact patient's primary care physician. If unable to reach primary care physician or do not receive response within 30 minutes, please contact Dr. Aguirre's office for clinical staff order clarification    Miscellaneous   Home Infusion Therapy:   SN to perform Infusion Therapy/Central Line Care.  Review Central Line Care & Central Line Flush with  patient.    Administer (drug and dose): TPN    Last dose given: 12/4                         Home dose due: on arrival home    Scrub the Hub: Prior to accessing the line, always perform a 30 second alcohol scrub  Each lumen of the central line is to be flushed at least daily with 10 mL Normal Saline and 3 mL Heparin flush (10 units/mL)  Skilled Nurse (SN) may draw blood from IV access  Blood Draw Procedure:   - Aspirate at least 5 mL of blood   - Discard   - Obtain specimen   - Change injection cap   - Flush with 20 mL Normal Saline followed by a                 3-5 mL Heparin flush (10 units/mL)  Central :   - Sterile dressing changes are done weekly and as needed.   - Use chlor-hexadine scrub to cleanse site, apply Biopatch to insertion site,       apply securement device dressing   - Injection caps are changed weekly and after EVERY lab draw.   - If sterile gauze is under dressing to control oozing,                 dressing change must be performed every 24 hours until gauze is not needed.    Home Health Aide:  Nursing Twice weekly and Home Health Aide Twice weekly    Wound Care Orders  no    I certify that this patient is confined to his home and needs intermittent skilled nursing care.

## 2022-12-04 NOTE — PLAN OF CARE
[facility] educator met with patient and daughter discussed discharge plan for home IVABX. Jose Young will dc home with daughter support. Daughter  will infuse TPN via CADD Pump. Patient and daughter educated on TPN procedure. TPN mat provided.  Patient education checklist reviewed and acknowledged by above person(s) above and are agreeable to discharge with home infusion plan of care. IV administration process using aspetic technique was reviewed with successful return demonstration. Dianne Young feels comfortable with infusion. Patient will dc home TPN hours for q 24 hours. Dosing schedule time 3:30 pm. No extensions needed to right brachial 39 cm double lumen picc placed on 12/2/22. [facility] will follow patient for weekly dressing changes and lab draws. Time allotted for questions. Patients nurse and case management team notified teaching has been completed.     TPN delivery made to bedside    Patient inquired if he could take anything for indigestion. Dr. Valiente stated nothing by mouth until imaging study. Patient and daughter verbalized understanding.     [facility]  Monica Cain Rn, Clinical Educator  Cell [phone]  Office [phone]  Fax [phone]

## 2022-12-04 NOTE — ASSESSMENT & PLAN NOTE
Jose Young is a 72yoM with a history of achalasia who underwent a POEM on 11/29 and revision 11/30    - patient seen and examined  - afebrile, hemodynamics stable  - UGI on 12/2 demonstrates small, persistent leak  - strict NPO  - PICC line insert and TPN initiated  - TPN reordered  - day 3 of 3 of unasyn; one dose of fluconazole complete  - pain meds via IV pushes-- plan to transition to PO regimen when appropriate  - dietician following  - out of bed, ambulate  - appreciate case management assistance with home health    Dispo: awaiting home health approval

## 2022-12-04 NOTE — PROGRESS NOTES
[facility]  General Surgery  Progress Note    Subjective:     History of Present Illness:  Patient is a 72 year-old male with HTN and HLD who presents for discussion of management for achalasia. He first developed dysphagia primarily to solids about 5 years ago which has slowly progressed in severity until the last 2 months, during which he's experienced daily dysphagia (solids>liquids), daily regurgitation, and overall 50lb weight loss. He denies chest pain, nausea, vomiting, retching, heartburn, or abdominal pain. His Eckardt score is 7/12.       Post-Op Info:  Procedure(s) (LRB):  EGD (ESOPHAGOGASTRODUODENOSCOPY) with musocal clip application (N/A)  INSERTION - MUCOSAL CLIP   4 Days Post-Op     Interval History: Patient seen and examined. Afebrile over last 24 hrs. PICC line placed, TPN initiated. Patient understandably frustrated by delay in his discharge. Explained that we are working on initiating home health services with home TPN infusions, which the patient voiced understanding. Case management called.     Medications:  Continuous Infusions:   lactated ringers 100 mL/hr at 12/03/22 1242    Standard Custom Day One ADULT TPN for patient with NO electrolyte abnormality and NO renal dysfunction (CENTRAL) 42 mL/hr at 12/03/22 2146    Standard Custom Day One ADULT TPN for patient with NO electrolyte abnormality and NO renal dysfunction (CENTRAL)       Scheduled Meds:   ampicillin-sulbactim (UNASYN) IVPB  1.5 g Intravenous Q6H    enoxaparin  40 mg Subcutaneous Daily    fat emulsion 20%  250 mL Intravenous Daily    fat emulsion 20%  250 mL Intravenous Daily    pantoprazole  40 mg Intravenous BID    sodium chloride 0.9%  10 mL Intravenous Q6H     PRN Meds:morphine, morphine, Flushing PICC Protocol **AND** sodium chloride 0.9% **AND** sodium chloride 0.9%     Review of patient's allergies indicates:  No Known Allergies  Objective:     Vital Signs (Most Recent):  Temp: 97.4 °F (36.3 °C) (12/04/22  0735)  Pulse: 65 (12/04/22 0735)  Resp: 16 (12/04/22 0735)  BP: (!) 142/73 (12/04/22 0735)  SpO2: 98 % (12/04/22 0735)   Vital Signs (24h Range):  Temp:  [96.9 °F (36.1 °C)-97.9 °F (36.6 °C)] 97.4 °F (36.3 °C)  Pulse:  [62-66] 65  Resp:  [16-19] 16  SpO2:  [95 %-99 %] 98 %  BP: (133-166)/(67-84) 142/73     Weight: 60.3 kg (133 lb)  Body mass index is 18.04 kg/m².    Intake/Output - Last 3 Shifts         12/02 0700  12/03 0659 12/03 0700  12/04 0659 12/04 0700 12/05 0659    I.V. (mL/kg)       Total Intake(mL/kg)       Urine (mL/kg/hr) 300 (0.2)      Total Output 300      Net -300                     Physical Exam  Vitals and nursing note reviewed.   Constitutional:       General: He is not in acute distress.     Appearance: Normal appearance.   HENT:      Head: Normocephalic and atraumatic.      Nose: Nose normal.      Mouth/Throat:      Mouth: Mucous membranes are moist.   Cardiovascular:      Rate and Rhythm: Normal rate and regular rhythm.   Pulmonary:      Effort: Pulmonary effort is normal. No respiratory distress.   Abdominal:      General: Abdomen is flat. There is no distension.      Palpations: Abdomen is soft.      Tenderness: There is no abdominal tenderness.   Musculoskeletal:         General: Normal range of motion.   Skin:     General: Skin is warm and dry.   Neurological:      General: No focal deficit present.      Mental Status: He is alert.       Significant Labs:  I have reviewed all pertinent lab results within the past 24 hours.    Significant Diagnostics:  I have reviewed all pertinent imaging results/findings within the past 24 hours.    Assessment/Plan:     * Achalasia  Jose Young is a 72yoM with a history of achalasia who underwent a POEM on 11/29 and revision 11/30    - patient seen and examined  - afebrile, hemodynamics stable  - UGI on 12/2 demonstrates small, persistent leak  - strict NPO  - PICC line insert and TPN initiated  - TPN reordered  - day 3 of 3 of unasyn; one dose of  fluconazole complete  - pain meds via IV pushes-- plan to transition to PO regimen when appropriate  - dietician following  - out of bed, ambulate  - appreciate case management assistance with home health    Dispo: awaiting home health approval        Good Valiente MD  General Surgery  [facility]

## 2022-12-04 NOTE — SUBJECTIVE & OBJECTIVE
Interval History: Patient seen and examined. Afebrile over last 24 hrs. PICC line placed, TPN initiated. Patient understandably frustrated by delay in his discharge. Explained that we are working on initiating home health services with home TPN infusions, which the patient voiced understanding. Case management called.     Medications:  Continuous Infusions:   lactated ringers 100 mL/hr at 12/03/22 1242    Standard Custom Day One ADULT TPN for patient with NO electrolyte abnormality and NO renal dysfunction (CENTRAL) 42 mL/hr at 12/03/22 2146    Standard Custom Day One ADULT TPN for patient with NO electrolyte abnormality and NO renal dysfunction (CENTRAL)       Scheduled Meds:   ampicillin-sulbactim (UNASYN) IVPB  1.5 g Intravenous Q6H    enoxaparin  40 mg Subcutaneous Daily    fat emulsion 20%  250 mL Intravenous Daily    fat emulsion 20%  250 mL Intravenous Daily    pantoprazole  40 mg Intravenous BID    sodium chloride 0.9%  10 mL Intravenous Q6H     PRN Meds:morphine, morphine, Flushing PICC Protocol **AND** sodium chloride 0.9% **AND** sodium chloride 0.9%     Review of patient's allergies indicates:  No Known Allergies  Objective:     Vital Signs (Most Recent):  Temp: 97.4 °F (36.3 °C) (12/04/22 0735)  Pulse: 65 (12/04/22 0735)  Resp: 16 (12/04/22 0735)  BP: (!) 142/73 (12/04/22 0735)  SpO2: 98 % (12/04/22 0735)   Vital Signs (24h Range):  Temp:  [96.9 °F (36.1 °C)-97.9 °F (36.6 °C)] 97.4 °F (36.3 °C)  Pulse:  [62-66] 65  Resp:  [16-19] 16  SpO2:  [95 %-99 %] 98 %  BP: (133-166)/(67-84) 142/73     Weight: 60.3 kg (133 lb)  Body mass index is 18.04 kg/m².    Intake/Output - Last 3 Shifts         12/02 0700 12/03 0659 12/03 0700 12/04 0659 12/04 0700 12/05 0659    I.V. (mL/kg)       Total Intake(mL/kg)       Urine (mL/kg/hr) 300 (0.2)      Total Output 300      Net -300                     Physical Exam  Vitals and nursing note reviewed.   Constitutional:       General: He is not in acute distress.      Appearance: Normal appearance.   HENT:      Head: Normocephalic and atraumatic.      Nose: Nose normal.      Mouth/Throat:      Mouth: Mucous membranes are moist.   Cardiovascular:      Rate and Rhythm: Normal rate and regular rhythm.   Pulmonary:      Effort: Pulmonary effort is normal. No respiratory distress.   Abdominal:      General: Abdomen is flat. There is no distension.      Palpations: Abdomen is soft.      Tenderness: There is no abdominal tenderness.   Musculoskeletal:         General: Normal range of motion.   Skin:     General: Skin is warm and dry.   Neurological:      General: No focal deficit present.      Mental Status: He is alert.       Significant Labs:  I have reviewed all pertinent lab results within the past 24 hours.    Significant Diagnostics:  I have reviewed all pertinent imaging results/findings within the past 24 hours.

## 2022-12-05 ENCOUNTER — TELEPHONE (OUTPATIENT)
Dept: SURGERY | Facility: CLINIC | Age: 72
End: 2022-12-05
Payer: MEDICARE

## 2022-12-05 DIAGNOSIS — Z09 POSTOP CHECK: Primary | ICD-10-CM

## 2022-12-05 NOTE — TELEPHONE ENCOUNTER
----- Message from Dianne Aguirre MD sent at 12/5/2022 12:40 PM CST -----  Contact: [phone]  Did he/his daughter request the appt on Wed? I don't need to see him on that date, but am 100% happy to if they'd like, and in which case we can plan for the morning during my [facility] clinic. Otherwise, I'll see him for a 2-week post-op as normal, which would be the following week.    We do need to schedule his follow-up outpatient UGI for this Friday morning, the earlier the better, so I can call him with the results and hopefully start his liquid diet before resident interviews start, since I won't be able to check messages after 1pm. Liquid only, no tablet.    Thanks!    ----- Message -----  From: Cheryl Kaminski RN  Sent: 12/5/2022  11:02 AM CST  To: Dianne Aguirre MD    When would be best for you to see this pt.  You have a case on Wednesday for noon, but would you want to see him sometime that morning?    -Cheryl  ----- Message -----  From: Brooks Etienne  Sent: 12/5/2022  10:11 AM CST  To: Mery Fisher Staff    Pt daughter is calling in ref to pt appt for this week -- states they spoke with the Dr before being discharged about pt being thomas for Wednesday -- please advise [phone]

## 2022-12-05 NOTE — PLAN OF CARE
[facility] - [facility]  Discharge Final Note    Primary Care Provider: Malorie Madera DO    Expected Discharge Date: 12/4/2022    Final Discharge Note (most recent)       Final Note - 12/05/22 1625          Final Note    Assessment Type Final Discharge Note     Anticipated Discharge Disposition Home-Health Care [facility]     What phone number can be called within the next 1-3 days to see how you are doing after discharge? --   [phone]                    Important Message from Medicare           Future Appointments   Date Time Provider Department Center   12/9/2022  8:30 AM [facility] [id number] 350 LB LIMIT [facility] XRAY OP [facility]   12/14/2022  1:00 PM [facility]   12/19/2022  8:20 AM Malorie Madera DO [facility]     Patient discharged home to care of [facility] on 12/4/22.

## 2022-12-05 NOTE — TELEPHONE ENCOUNTER
Spoke with pt daughter.  She stats that she is needing to schedule her father's follow up UGI.    UGI scheduled for Friday 12/9 per her request.  Will verify timing with Dr. Aguirre and will notify pt with any changes needed.

## 2022-12-06 ENCOUNTER — LAB VISIT (OUTPATIENT)
Dept: LAB | Facility: HOSPITAL | Age: 72
End: 2022-12-06
Attending: STUDENT IN AN ORGANIZED HEALTH CARE EDUCATION/TRAINING PROGRAM
Payer: MEDICARE

## 2022-12-06 DIAGNOSIS — K22.0 ACHALASIA AND CARDIOSPASM: Primary | ICD-10-CM

## 2022-12-06 LAB
ALBUMIN SERPL BCP-MCNC: 3 G/DL (ref 3.5–5.2)
ALP SERPL-CCNC: 50 U/L (ref 55–135)
ALT SERPL W/O P-5'-P-CCNC: 11 U/L (ref 10–44)
ANION GAP SERPL CALC-SCNC: 12 MMOL/L (ref 8–16)
AST SERPL-CCNC: 23 U/L (ref 10–40)
BASOPHILS # BLD AUTO: 0.04 K/UL (ref 0–0.2)
BASOPHILS NFR BLD: 0.6 % (ref 0–1.9)
BILIRUB SERPL-MCNC: 0.3 MG/DL (ref 0.1–1)
BUN SERPL-MCNC: 9 MG/DL (ref 8–23)
CALCIUM SERPL-MCNC: 9.2 MG/DL (ref 8.7–10.5)
CHLORIDE SERPL-SCNC: 105 MMOL/L (ref 95–110)
CO2 SERPL-SCNC: 24 MMOL/L (ref 23–29)
CREAT SERPL-MCNC: 0.8 MG/DL (ref 0.5–1.4)
DIFFERENTIAL METHOD: ABNORMAL
EOSINOPHIL # BLD AUTO: 0.2 K/UL (ref 0–0.5)
EOSINOPHIL NFR BLD: 2.9 % (ref 0–8)
ERYTHROCYTE [DISTWIDTH] IN BLOOD BY AUTOMATED COUNT: 14.7 % (ref 11.5–14.5)
EST. GFR  (NO RACE VARIABLE): >60 ML/MIN/1.73 M^2
GLUCOSE SERPL-MCNC: 80 MG/DL (ref 70–110)
HCT VFR BLD AUTO: 36.8 % (ref 40–54)
HGB BLD-MCNC: 12.5 G/DL (ref 14–18)
IMM GRANULOCYTES # BLD AUTO: 0.01 K/UL (ref 0–0.04)
IMM GRANULOCYTES NFR BLD AUTO: 0.2 % (ref 0–0.5)
LYMPHOCYTES # BLD AUTO: 1.6 K/UL (ref 1–4.8)
LYMPHOCYTES NFR BLD: 25.2 % (ref 18–48)
MAGNESIUM SERPL-MCNC: 2 MG/DL (ref 1.6–2.6)
MCH RBC QN AUTO: 29.2 PG (ref 27–31)
MCHC RBC AUTO-ENTMCNC: 34 G/DL (ref 32–36)
MCV RBC AUTO: 86 FL (ref 82–98)
MONOCYTES # BLD AUTO: 0.4 K/UL (ref 0.3–1)
MONOCYTES NFR BLD: 7.1 % (ref 4–15)
NEUTROPHILS # BLD AUTO: 3.9 K/UL (ref 1.8–7.7)
NEUTROPHILS NFR BLD: 64 % (ref 38–73)
NRBC BLD-RTO: 0 /100 WBC
PHOSPHATE SERPL-MCNC: 2.9 MG/DL (ref 2.7–4.5)
PLATELET # BLD AUTO: 271 K/UL (ref 150–450)
PMV BLD AUTO: 10 FL (ref 9.2–12.9)
POTASSIUM SERPL-SCNC: 4.1 MMOL/L (ref 3.5–5.1)
PROT SERPL-MCNC: 7.1 G/DL (ref 6–8.4)
RBC # BLD AUTO: 4.28 M/UL (ref 4.6–6.2)
SODIUM SERPL-SCNC: 141 MMOL/L (ref 136–145)
TRIGL SERPL-MCNC: 94 MG/DL (ref 30–150)
WBC # BLD AUTO: 6.16 K/UL (ref 3.9–12.7)

## 2022-12-06 PROCEDURE — G0180 MD CERTIFICATION HHA PATIENT: HCPCS | Mod: ,,, | Performed by: SURGERY

## 2022-12-06 PROCEDURE — G0180 PR HOME HEALTH MD CERTIFICATION: ICD-10-PCS | Mod: ,,, | Performed by: SURGERY

## 2022-12-06 PROCEDURE — 83735 ASSAY OF MAGNESIUM: CPT | Mod: HCNC | Performed by: STUDENT IN AN ORGANIZED HEALTH CARE EDUCATION/TRAINING PROGRAM

## 2022-12-06 PROCEDURE — 84100 ASSAY OF PHOSPHORUS: CPT | Mod: HCNC | Performed by: STUDENT IN AN ORGANIZED HEALTH CARE EDUCATION/TRAINING PROGRAM

## 2022-12-06 PROCEDURE — 85025 COMPLETE CBC W/AUTO DIFF WBC: CPT | Mod: HCNC | Performed by: STUDENT IN AN ORGANIZED HEALTH CARE EDUCATION/TRAINING PROGRAM

## 2022-12-06 PROCEDURE — 84478 ASSAY OF TRIGLYCERIDES: CPT | Mod: HCNC | Performed by: STUDENT IN AN ORGANIZED HEALTH CARE EDUCATION/TRAINING PROGRAM

## 2022-12-06 PROCEDURE — 80053 COMPREHEN METABOLIC PANEL: CPT | Mod: HCNC | Performed by: STUDENT IN AN ORGANIZED HEALTH CARE EDUCATION/TRAINING PROGRAM

## 2022-12-09 ENCOUNTER — TELEPHONE (OUTPATIENT)
Dept: SURGERY | Facility: CLINIC | Age: 72
End: 2022-12-09
Payer: MEDICARE

## 2022-12-09 ENCOUNTER — HOSPITAL ENCOUNTER (OUTPATIENT)
Dept: RADIOLOGY | Facility: HOSPITAL | Age: 72
Discharge: HOME | End: 2022-12-09
Attending: SURGERY
Payer: MEDICARE

## 2022-12-09 DIAGNOSIS — Z09 POSTOP CHECK: ICD-10-CM

## 2022-12-09 DIAGNOSIS — Z09 POSTOP CHECK: Primary | ICD-10-CM

## 2022-12-09 PROCEDURE — 74240 X-RAY XM UPR GI TRC 1CNTRST: CPT | Mod: 26,HCNC,, | Performed by: RADIOLOGY

## 2022-12-09 PROCEDURE — 25500020 PHARM REV CODE 255: Mod: HCNC | Performed by: SURGERY

## 2022-12-09 PROCEDURE — 74240 FL UPPER GI: ICD-10-PCS | Mod: 26,HCNC,, | Performed by: RADIOLOGY

## 2022-12-09 PROCEDURE — 74240 X-RAY XM UPR GI TRC 1CNTRST: CPT | Mod: TC,HCNC,FY

## 2022-12-09 RX ADMIN — IOHEXOL 70 ML: 350 INJECTION, SOLUTION INTRAVENOUS at 08:12

## 2022-12-09 NOTE — DISCHARGE SUMMARY
[facility] - Surgery  DISCHARGE SUMMARY  General Surgery      Admit Date:  11/29/2022    Discharge Date and Time:  12/9/2022  12:00 PM    Attending Physician:  Dianne Aguirre MD     Discharge Provider:  Maria Teresa Teran MD     Reason for Admission:  Achalasia     Procedures Performed:    MYOTOMY, PERORAL, ENDOSCOPIC POEM    Hospital Course:  Please see the preoperative H&P and other available documentation for full details related to history prior to this admission.  Briefly, Jose Young is a 72 y.o. male who was admitted following scheduled elective surgery for Achalasia    Following a complete preoperative discussion of the risks and benefits of surgery with signed informed consent, the patient was taken to the operating room on 11/29/2022 and underwent the above stated procedures.  The patient tolerated POEM well. Please see the operative report for full intraoperative findings and details.  Postoperatively, imaging showed a leak. The following day, the patient went back to the OR for EGD and clip replacement. The pt tolerated this procedure well. Unfortunately, followup imaging continued to show a leak. A PICC was then placed, and TPN was started on 12/04; the patient was set up with home health to receive TPN at home.  Labs and vital signs remained stable and appropriate throughout course. The patient's pain was controlled on pain medications without problem. At discharge, the pt was stable and appropriate for discharge home with TPN on POD 5 from index operation. Pt will have repeat UGI on Friday 12/9 to assess for persistent vs healed mucosotomy leak, pt will remain strict NPO during this time.     Consults:  None.    Significant Diagnostic Studies:   No results for input(s): WBC, HGB, HCT, PLT, BAND, METAMYELOCYT, MYELOPCT, HGBA1C in the last 72 hours.No results for input(s): NA, K, CL, CO2, BUN, CREATININE, GLU, CALCIUM, CAION, MG, PHOS, AST, ALT, ALKPHOS, BILITOT, BILIDIR, PROT, ALBUMIN, PREALBUMIN,  AMYLASE, LIPASE, CRP, HSCRP, SEDRATE, PROCAL in the last 72 hours.No results for input(s): INR, PTT, LABHEPA, LACTATE, TROPONINI, CPK, CPKMB, MB, BNP in the last 72 hours.No results for input(s): PH, PCO2, PO2, HCO3 in the last 72 hours.      Final Diagnoses:   Principal Problem:  Achalasia   Secondary Diagnoses:    Active Hospital Problems    Diagnosis  POA    *Achalasia [K22.0]  Yes      Resolved Hospital Problems   No resolved problems to display.       Discharged Condition:  Good    Disposition:  Home or Self Care    Follow Up/Patient Instructions:  Pt will have repeat UGI on Friday 12/9 to assess for persistent vs healed mucosotomy leak, pt will remain strict NPO during this time.     Medications:  Reconciled Home Medications:    Discharge Medication List as of 12/4/2022  3:58 PM        CONTINUE these medications which have NOT CHANGED    Details   pantoprazole (PROTONIX) 40 MG tablet TAKE 1 TABLET (40 MG TOTAL) BY MOUTH ONCE DAILY. 30 MINUTES BEFORE ANY FOOD., Starting Tue 11/29/2022, Until Sat 1/28/2023, Normal      ascorbic acid, vitamin C, (VITAMIN C) 250 MG tablet Take 250 mg by mouth once daily., Historical Med      atorvastatin (LIPITOR) 40 MG tablet TAKE 1 TABLET(40 MG) BY MOUTH EVERY DAY, Normal      b complex vitamins tablet Take 1 tablet by mouth once daily., Historical Med      cyproheptadine (PERIACTIN) 4 mg tablet Take 4 mg by mouth 3 (three) times daily. Pt needs refill, Starting Sun 4/18/2021, Historical Med      erythromycin (ROMYCIN) ophthalmic ointment APPLY A 3 MM RIBBON TO THE AFFECTED EYE AT NIGHT FOR 7 DAYS, Historical Med      hydroCHLOROthiazide (MICROZIDE) 12.5 mg capsule TAKE 1 CAPSULE(12.5 MG) BY MOUTH EVERY DAY, Normal      Lactobacillus combination no.8 (ADULT PROBIOTIC ORAL) Take by mouth., Historical Med      ofloxacin (OCUFLOX) 0.3 % ophthalmic solution INSTILL 1 DROP INTO THE RIGHT EYE FOUR TIMES DAILY FOR 7 DAYS, Historical Med      vitamin D (VITAMIN D3) 1000 units Tab Take  1,000 Units by mouth once daily., Historical Med           Discharge Procedure Orders   Diet NPO     Notify your health care provider if you experience any of the following:  increased confusion or weakness     Notify your health care provider if you experience any of the following:  persistent dizziness, light-headedness, or visual disturbances     Notify your health care provider if you experience any of the following:  worsening rash     Notify your health care provider if you experience any of the following:  severe persistent headache     Notify your health care provider if you experience any of the following:  difficulty breathing or increased cough     Notify your health care provider if you experience any of the following:  redness, tenderness, or signs of infection (pain, swelling, redness, odor or green/yellow discharge around incision site)     Notify your health care provider if you experience any of the following:  severe uncontrolled pain     Notify your health care provider if you experience any of the following:  persistent nausea and vomiting or diarrhea     Notify your health care provider if you experience any of the following:  temperature >100.4         Maria Teresa Teran MD

## 2022-12-12 ENCOUNTER — LAB VISIT (OUTPATIENT)
Dept: LAB | Facility: HOSPITAL | Age: 72
End: 2022-12-12
Attending: STUDENT IN AN ORGANIZED HEALTH CARE EDUCATION/TRAINING PROGRAM
Payer: MEDICARE

## 2022-12-12 DIAGNOSIS — K22.0 ACHALASIA AND CARDIOSPASM: Primary | ICD-10-CM

## 2022-12-12 LAB
ALBUMIN SERPL BCP-MCNC: 3.5 G/DL (ref 3.5–5.2)
ALP SERPL-CCNC: 61 U/L (ref 55–135)
ALT SERPL W/O P-5'-P-CCNC: 42 U/L (ref 10–44)
ANION GAP SERPL CALC-SCNC: 10 MMOL/L (ref 8–16)
AST SERPL-CCNC: 33 U/L (ref 10–40)
BASOPHILS # BLD AUTO: 0.03 K/UL (ref 0–0.2)
BASOPHILS NFR BLD: 0.7 % (ref 0–1.9)
BILIRUB SERPL-MCNC: 0.4 MG/DL (ref 0.1–1)
BUN SERPL-MCNC: 14 MG/DL (ref 8–23)
CALCIUM SERPL-MCNC: 9.4 MG/DL (ref 8.7–10.5)
CHLORIDE SERPL-SCNC: 101 MMOL/L (ref 95–110)
CO2 SERPL-SCNC: 26 MMOL/L (ref 23–29)
CREAT SERPL-MCNC: 0.9 MG/DL (ref 0.5–1.4)
DIFFERENTIAL METHOD: ABNORMAL
EOSINOPHIL # BLD AUTO: 0.2 K/UL (ref 0–0.5)
EOSINOPHIL NFR BLD: 4.7 % (ref 0–8)
ERYTHROCYTE [DISTWIDTH] IN BLOOD BY AUTOMATED COUNT: 14.5 % (ref 11.5–14.5)
EST. GFR  (NO RACE VARIABLE): >60 ML/MIN/1.73 M^2
GLUCOSE SERPL-MCNC: 123 MG/DL (ref 70–110)
HCT VFR BLD AUTO: 37.8 % (ref 40–54)
HGB BLD-MCNC: 12.8 G/DL (ref 14–18)
IMM GRANULOCYTES # BLD AUTO: 0.01 K/UL (ref 0–0.04)
IMM GRANULOCYTES NFR BLD AUTO: 0.2 % (ref 0–0.5)
LYMPHOCYTES # BLD AUTO: 1.5 K/UL (ref 1–4.8)
LYMPHOCYTES NFR BLD: 34.2 % (ref 18–48)
MAGNESIUM SERPL-MCNC: 2.1 MG/DL (ref 1.6–2.6)
MCH RBC QN AUTO: 29.3 PG (ref 27–31)
MCHC RBC AUTO-ENTMCNC: 33.9 G/DL (ref 32–36)
MCV RBC AUTO: 87 FL (ref 82–98)
MONOCYTES # BLD AUTO: 0.4 K/UL (ref 0.3–1)
MONOCYTES NFR BLD: 8.7 % (ref 4–15)
NEUTROPHILS # BLD AUTO: 2.3 K/UL (ref 1.8–7.7)
NEUTROPHILS NFR BLD: 51.5 % (ref 38–73)
NRBC BLD-RTO: 0 /100 WBC
PHOSPHATE SERPL-MCNC: 3.6 MG/DL (ref 2.7–4.5)
PLATELET # BLD AUTO: 352 K/UL (ref 150–450)
PLATELET # BLD AUTO: 359 K/UL (ref 150–450)
PMV BLD AUTO: 10.1 FL (ref 9.2–12.9)
PMV BLD AUTO: 10.2 FL (ref 9.2–12.9)
POTASSIUM SERPL-SCNC: 5.2 MMOL/L (ref 3.5–5.1)
PROT SERPL-MCNC: 7.4 G/DL (ref 6–8.4)
RBC # BLD AUTO: 4.37 M/UL (ref 4.6–6.2)
SODIUM SERPL-SCNC: 137 MMOL/L (ref 136–145)
TRIGL SERPL-MCNC: 50 MG/DL (ref 30–150)
WBC # BLD AUTO: 4.5 K/UL (ref 3.9–12.7)

## 2022-12-12 PROCEDURE — 84100 ASSAY OF PHOSPHORUS: CPT | Mod: HCNC | Performed by: STUDENT IN AN ORGANIZED HEALTH CARE EDUCATION/TRAINING PROGRAM

## 2022-12-12 PROCEDURE — 85049 AUTOMATED PLATELET COUNT: CPT | Mod: HCNC | Performed by: STUDENT IN AN ORGANIZED HEALTH CARE EDUCATION/TRAINING PROGRAM

## 2022-12-12 PROCEDURE — 80053 COMPREHEN METABOLIC PANEL: CPT | Mod: HCNC | Performed by: STUDENT IN AN ORGANIZED HEALTH CARE EDUCATION/TRAINING PROGRAM

## 2022-12-12 PROCEDURE — 85025 COMPLETE CBC W/AUTO DIFF WBC: CPT | Mod: HCNC | Performed by: STUDENT IN AN ORGANIZED HEALTH CARE EDUCATION/TRAINING PROGRAM

## 2022-12-12 PROCEDURE — 83735 ASSAY OF MAGNESIUM: CPT | Mod: HCNC | Performed by: STUDENT IN AN ORGANIZED HEALTH CARE EDUCATION/TRAINING PROGRAM

## 2022-12-12 PROCEDURE — 84478 ASSAY OF TRIGLYCERIDES: CPT | Mod: HCNC | Performed by: STUDENT IN AN ORGANIZED HEALTH CARE EDUCATION/TRAINING PROGRAM

## 2022-12-13 ENCOUNTER — EXTERNAL HOME HEALTH (OUTPATIENT)
Dept: HOME HEALTH SERVICES | Facility: HOSPITAL | Age: 72
End: 2022-12-13
Payer: MEDICARE

## 2022-12-14 ENCOUNTER — HOSPITAL ENCOUNTER (OUTPATIENT)
Dept: RADIOLOGY | Facility: HOSPITAL | Age: 72
Discharge: HOME | End: 2022-12-14
Attending: SURGERY
Payer: MEDICARE

## 2022-12-14 ENCOUNTER — OFFICE VISIT (OUTPATIENT)
Dept: SURGERY | Facility: CLINIC | Age: 72
End: 2022-12-14
Payer: MEDICARE

## 2022-12-14 VITALS
DIASTOLIC BLOOD PRESSURE: 71 MMHG | HEART RATE: 74 BPM | BODY MASS INDEX: 17.91 KG/M2 | WEIGHT: 132.25 LBS | SYSTOLIC BLOOD PRESSURE: 133 MMHG | HEIGHT: 72 IN

## 2022-12-14 DIAGNOSIS — Z09 POSTOP CHECK: ICD-10-CM

## 2022-12-14 DIAGNOSIS — K22.0 ACHALASIA: Primary | ICD-10-CM

## 2022-12-14 PROCEDURE — 74240 FL UPPER GI: ICD-10-PCS | Mod: 26,HCNC,, | Performed by: STUDENT IN AN ORGANIZED HEALTH CARE EDUCATION/TRAINING PROGRAM

## 2022-12-14 PROCEDURE — 3288F FALL RISK ASSESSMENT DOCD: CPT | Mod: HCNC,CPTII,S$GLB, | Performed by: SURGERY

## 2022-12-14 PROCEDURE — 1101F PR PT FALLS ASSESS DOC 0-1 FALLS W/OUT INJ PAST YR: ICD-10-PCS | Mod: HCNC,CPTII,S$GLB, | Performed by: SURGERY

## 2022-12-14 PROCEDURE — 1101F PT FALLS ASSESS-DOCD LE1/YR: CPT | Mod: HCNC,CPTII,S$GLB, | Performed by: SURGERY

## 2022-12-14 PROCEDURE — 3075F SYST BP GE 130 - 139MM HG: CPT | Mod: HCNC,CPTII,S$GLB, | Performed by: SURGERY

## 2022-12-14 PROCEDURE — 1159F MED LIST DOCD IN RCRD: CPT | Mod: HCNC,CPTII,S$GLB, | Performed by: SURGERY

## 2022-12-14 PROCEDURE — 25500020 PHARM REV CODE 255: Mod: HCNC | Performed by: SURGERY

## 2022-12-14 PROCEDURE — 3078F DIAST BP <80 MM HG: CPT | Mod: HCNC,CPTII,S$GLB, | Performed by: SURGERY

## 2022-12-14 PROCEDURE — 1126F AMNT PAIN NOTED NONE PRSNT: CPT | Mod: HCNC,CPTII,S$GLB, | Performed by: SURGERY

## 2022-12-14 PROCEDURE — 1159F PR MEDICATION LIST DOCUMENTED IN MEDICAL RECORD: ICD-10-PCS | Mod: HCNC,CPTII,S$GLB, | Performed by: SURGERY

## 2022-12-14 PROCEDURE — 3078F PR MOST RECENT DIASTOLIC BLOOD PRESSURE < 80 MM HG: ICD-10-PCS | Mod: HCNC,CPTII,S$GLB, | Performed by: SURGERY

## 2022-12-14 PROCEDURE — 3288F PR FALLS RISK ASSESSMENT DOCUMENTED: ICD-10-PCS | Mod: HCNC,CPTII,S$GLB, | Performed by: SURGERY

## 2022-12-14 PROCEDURE — 74240 X-RAY XM UPR GI TRC 1CNTRST: CPT | Mod: 26,HCNC,, | Performed by: STUDENT IN AN ORGANIZED HEALTH CARE EDUCATION/TRAINING PROGRAM

## 2022-12-14 PROCEDURE — 3008F PR BODY MASS INDEX (BMI) DOCUMENTED: ICD-10-PCS | Mod: HCNC,CPTII,S$GLB, | Performed by: SURGERY

## 2022-12-14 PROCEDURE — 1126F PR PAIN SEVERITY QUANTIFIED, NO PAIN PRESENT: ICD-10-PCS | Mod: HCNC,CPTII,S$GLB, | Performed by: SURGERY

## 2022-12-14 PROCEDURE — 99999 PR PBB SHADOW E&M-EST. PATIENT-LVL III: CPT | Mod: PBBFAC,HCNC,, | Performed by: SURGERY

## 2022-12-14 PROCEDURE — 99024 PR POST-OP FOLLOW-UP VISIT: ICD-10-PCS | Mod: HCNC,S$GLB,, | Performed by: SURGERY

## 2022-12-14 PROCEDURE — 99024 POSTOP FOLLOW-UP VISIT: CPT | Mod: HCNC,S$GLB,, | Performed by: SURGERY

## 2022-12-14 PROCEDURE — 3075F PR MOST RECENT SYSTOLIC BLOOD PRESS GE 130-139MM HG: ICD-10-PCS | Mod: HCNC,CPTII,S$GLB, | Performed by: SURGERY

## 2022-12-14 PROCEDURE — 74240 X-RAY XM UPR GI TRC 1CNTRST: CPT | Mod: TC,HCNC,FY

## 2022-12-14 PROCEDURE — 99999 PR PBB SHADOW E&M-EST. PATIENT-LVL III: ICD-10-PCS | Mod: PBBFAC,HCNC,, | Performed by: SURGERY

## 2022-12-14 PROCEDURE — 3008F BODY MASS INDEX DOCD: CPT | Mod: HCNC,CPTII,S$GLB, | Performed by: SURGERY

## 2022-12-14 RX ORDER — OMEPRAZOLE 40 MG/1
40 CAPSULE, DELAYED RELEASE ORAL EVERY MORNING
Qty: 30 CAPSULE | Refills: 11 | Status: SHIPPED | OUTPATIENT
Start: 2022-12-14 | End: 2023-12-14

## 2022-12-14 RX ADMIN — IOHEXOL 30 ML: 350 INJECTION, SOLUTION INTRAVENOUS at 08:12

## 2022-12-15 ENCOUNTER — DOCUMENT SCAN (OUTPATIENT)
Dept: HOME HEALTH SERVICES | Facility: HOSPITAL | Age: 72
End: 2022-12-15
Payer: MEDICARE

## 2022-12-16 DIAGNOSIS — E78.2 HYPERLIPIDEMIA, MIXED: ICD-10-CM

## 2022-12-16 NOTE — TELEPHONE ENCOUNTER
No new care gaps identified.  [facility] Embedded Care Gaps. Reference number: [id number]. 12/16/2022   10:58:05 AM CST

## 2022-12-17 NOTE — TELEPHONE ENCOUNTER
Refill Routing Note   Medication(s) are not appropriate for processing by [facility] for the following reason(s):      - Patient has not been seen in over 15 months by PCP  - Required laboratory values are outdated    [facility] action(s):  Defer          Medication reconciliation completed: No     Appointments  past 12m or future 3m with PCP    Date Provider   Last Visit   4/19/2021 Malorie Madera, DO   Next Visit   2/6/2023 Malorie Madera, DO   ED visits in past 90 days: 0        Note composed:6:10 PM 12/16/2022

## 2022-12-19 RX ORDER — ATORVASTATIN CALCIUM 40 MG/1
40 TABLET, FILM COATED ORAL DAILY
Qty: 30 TABLET | Refills: 0 | Status: SHIPPED | OUTPATIENT
Start: 2022-12-19 | End: 2023-05-09

## 2022-12-21 PROBLEM — K22.0 ACHALASIA: Status: RESOLVED | Noted: 2022-11-07 | Resolved: 2022-12-21

## 2022-12-21 NOTE — PROGRESS NOTES
"HPI:  Mr. Young is a 72 year-old male who presents for post-op follow-up s/p Per Oral Endoscopic Myotomy 11/29/22 c/b small persistent mucosotomy leak for which he has been NPO on TPN. Interval Upper GI this AM demonstrated resolution of leak. He has already had a breakfast bowl this AM including scrambled eggs and small potatoes after reading the result on the portal. He states he feels "great" and breakfast went down without any dysphagia, chest pain, regurgitation, nausea, vomiting, or heartburn. He denies fevers, chills, SOB, cough or abdominal pain. He is very much looking forward to eating.    PHYSICAL EXAM:  Physical Exam  Vitals reviewed.   Constitutional:       General: He is not in acute distress.     Appearance: He is not ill-appearing.      Comments: Thin but well-appearing   Cardiovascular:      Rate and Rhythm: Normal rate and regular rhythm.   Pulmonary:      Effort: Pulmonary effort is normal. No respiratory distress.   Abdominal:      General: Abdomen is flat. There is no distension.      Palpations: Abdomen is soft.      Tenderness: There is no abdominal tenderness. There is no guarding.   Musculoskeletal:         General: No swelling.   Neurological:      Mental Status: He is alert and oriented to person, place, and time.   Psychiatric:         Mood and Affect: Mood normal.         Behavior: Behavior normal.     ASSESSMENT:    Resolution of small mucosotomy leak. Patient otherwise doing well. Tolerating soft diet without dysphagia.     PLAN:    Soft diet x1 week then ADAT. Remove PICC and d/c TPN.  Daily PPI until 1-year surveillance endoscopy. May d/c if no symptoms of heartburn or e/o silent reflux at that time.  RTC PRN.     Dianne Aguirre  12/14/22      "

## 2023-01-09 ENCOUNTER — DOCUMENT SCAN (OUTPATIENT)
Dept: HOME HEALTH SERVICES | Facility: HOSPITAL | Age: 73
End: 2023-01-09
Payer: MEDICARE

## 2023-02-06 ENCOUNTER — OFFICE VISIT (OUTPATIENT)
Dept: INTERNAL MEDICINE | Facility: CLINIC | Age: 73
End: 2023-02-06
Payer: MEDICARE

## 2023-02-06 VITALS
HEART RATE: 72 BPM | RESPIRATION RATE: 20 BRPM | HEIGHT: 72 IN | WEIGHT: 137.38 LBS | OXYGEN SATURATION: 100 % | DIASTOLIC BLOOD PRESSURE: 72 MMHG | BODY MASS INDEX: 18.61 KG/M2 | SYSTOLIC BLOOD PRESSURE: 138 MMHG | TEMPERATURE: 98 F

## 2023-02-06 DIAGNOSIS — N28.89 LEFT RENAL MASS: ICD-10-CM

## 2023-02-06 DIAGNOSIS — I10 ESSENTIAL HYPERTENSION: Primary | ICD-10-CM

## 2023-02-06 DIAGNOSIS — R97.20 ELEVATED PSA: ICD-10-CM

## 2023-02-06 DIAGNOSIS — E78.5 HYPERLIPIDEMIA LDL GOAL <130: ICD-10-CM

## 2023-02-06 PROCEDURE — 1101F PR PT FALLS ASSESS DOC 0-1 FALLS W/OUT INJ PAST YR: ICD-10-PCS | Mod: HCNC,CPTII,S$GLB, | Performed by: INTERNAL MEDICINE

## 2023-02-06 PROCEDURE — 1126F AMNT PAIN NOTED NONE PRSNT: CPT | Mod: HCNC,CPTII,S$GLB, | Performed by: INTERNAL MEDICINE

## 2023-02-06 PROCEDURE — 99999 PR PBB SHADOW E&M-EST. PATIENT-LVL IV: ICD-10-PCS | Mod: PBBFAC,HCNC,, | Performed by: INTERNAL MEDICINE

## 2023-02-06 PROCEDURE — 3008F BODY MASS INDEX DOCD: CPT | Mod: HCNC,CPTII,S$GLB, | Performed by: INTERNAL MEDICINE

## 2023-02-06 PROCEDURE — 3288F PR FALLS RISK ASSESSMENT DOCUMENTED: ICD-10-PCS | Mod: HCNC,CPTII,S$GLB, | Performed by: INTERNAL MEDICINE

## 2023-02-06 PROCEDURE — 99214 OFFICE O/P EST MOD 30 MIN: CPT | Mod: HCNC,S$GLB,, | Performed by: INTERNAL MEDICINE

## 2023-02-06 PROCEDURE — 1101F PT FALLS ASSESS-DOCD LE1/YR: CPT | Mod: HCNC,CPTII,S$GLB, | Performed by: INTERNAL MEDICINE

## 2023-02-06 PROCEDURE — 1160F RVW MEDS BY RX/DR IN RCRD: CPT | Mod: HCNC,CPTII,S$GLB, | Performed by: INTERNAL MEDICINE

## 2023-02-06 PROCEDURE — 3075F PR MOST RECENT SYSTOLIC BLOOD PRESS GE 130-139MM HG: ICD-10-PCS | Mod: HCNC,CPTII,S$GLB, | Performed by: INTERNAL MEDICINE

## 2023-02-06 PROCEDURE — 1159F PR MEDICATION LIST DOCUMENTED IN MEDICAL RECORD: ICD-10-PCS | Mod: HCNC,CPTII,S$GLB, | Performed by: INTERNAL MEDICINE

## 2023-02-06 PROCEDURE — 1126F PR PAIN SEVERITY QUANTIFIED, NO PAIN PRESENT: ICD-10-PCS | Mod: HCNC,CPTII,S$GLB, | Performed by: INTERNAL MEDICINE

## 2023-02-06 PROCEDURE — 3288F FALL RISK ASSESSMENT DOCD: CPT | Mod: HCNC,CPTII,S$GLB, | Performed by: INTERNAL MEDICINE

## 2023-02-06 PROCEDURE — 1160F PR REVIEW ALL MEDS BY PRESCRIBER/CLIN PHARMACIST DOCUMENTED: ICD-10-PCS | Mod: HCNC,CPTII,S$GLB, | Performed by: INTERNAL MEDICINE

## 2023-02-06 PROCEDURE — 3078F PR MOST RECENT DIASTOLIC BLOOD PRESSURE < 80 MM HG: ICD-10-PCS | Mod: HCNC,CPTII,S$GLB, | Performed by: INTERNAL MEDICINE

## 2023-02-06 PROCEDURE — 3075F SYST BP GE 130 - 139MM HG: CPT | Mod: HCNC,CPTII,S$GLB, | Performed by: INTERNAL MEDICINE

## 2023-02-06 PROCEDURE — 99214 PR OFFICE/OUTPT VISIT, EST, LEVL IV, 30-39 MIN: ICD-10-PCS | Mod: HCNC,S$GLB,, | Performed by: INTERNAL MEDICINE

## 2023-02-06 PROCEDURE — 3078F DIAST BP <80 MM HG: CPT | Mod: HCNC,CPTII,S$GLB, | Performed by: INTERNAL MEDICINE

## 2023-02-06 PROCEDURE — 1159F MED LIST DOCD IN RCRD: CPT | Mod: HCNC,CPTII,S$GLB, | Performed by: INTERNAL MEDICINE

## 2023-02-06 PROCEDURE — 99999 PR PBB SHADOW E&M-EST. PATIENT-LVL IV: CPT | Mod: PBBFAC,HCNC,, | Performed by: INTERNAL MEDICINE

## 2023-02-06 PROCEDURE — 3008F PR BODY MASS INDEX (BMI) DOCUMENTED: ICD-10-PCS | Mod: HCNC,CPTII,S$GLB, | Performed by: INTERNAL MEDICINE

## 2023-02-06 RX ORDER — HYDROCHLOROTHIAZIDE 12.5 MG/1
12.5 CAPSULE ORAL DAILY
Qty: 30 CAPSULE | Refills: 11 | Status: SHIPPED | OUTPATIENT
Start: 2023-02-06

## 2023-02-06 NOTE — PROGRESS NOTES
Jose Young  72 y.o. Black or  male  [id number]    Chief Complaint:  Chief Complaint   Patient presents with    Follow-up       History of Present Illness:  Presents to the clinic for a follow up.   HTN--has been out of HCTZ. B/P slightly elevated.   HLD--compliant with atorvastatin. Due for labs.   Elevated PSA/Left renal mass--management per urology. He will need to see urology here since the last one he saw was in [place].     Laboratory:  Lab Results   Component Value Date    WBC 4.50 12/12/2022    HGB 12.8 (L) 12/12/2022    HCT 37.8 (L) 12/12/2022     12/12/2022     12/12/2022    CHOL 245 (H) 08/07/2020    TRIG 50 12/12/2022    HDL 48 08/07/2020    ALT 42 12/12/2022    AST 33 12/12/2022     12/12/2022    K 5.2 (H) 12/12/2022     12/12/2022    CREATININE 0.9 12/12/2022    BUN 14 12/12/2022    CO2 26 12/12/2022    TSH 1.699 10/14/2022    PSA 5.3 (H) 08/07/2020     Lab Results   Component Value Date    LDLCALC 179.2 (H) 08/07/2020       History:  Past Medical History:   Diagnosis Date    Achalasia 11/7/2022    Hypertension     Mixed hyperlipidemia        Medications:  Current Outpatient Medications on File Prior to Visit   Medication Sig Dispense Refill    ascorbic acid, vitamin C, (VITAMIN C) 250 MG tablet Take 250 mg by mouth once daily.      atorvastatin (LIPITOR) 40 MG tablet Take 1 tablet (40 mg total) by mouth once daily. 30 tablet 0    b complex vitamins tablet Take 1 tablet by mouth once daily.      cyproheptadine (PERIACTIN) 4 mg tablet Take 4 mg by mouth 3 (three) times daily. Pt needs refill      erythromycin (ROMYCIN) ophthalmic ointment APPLY A 3 MM RIBBON TO THE AFFECTED EYE AT NIGHT FOR 7 DAYS      Lactobacillus combination no.8 (ADULT PROBIOTIC ORAL) Take by mouth.      ofloxacin (OCUFLOX) 0.3 % ophthalmic solution INSTILL 1 DROP INTO THE RIGHT EYE FOUR TIMES DAILY FOR 7 DAYS      omeprazole (PRILOSEC) 40 MG capsule Take 1 capsule (40 mg total) by mouth  every morning. 30 capsule 11    vitamin D (VITAMIN D3) 1000 units Tab Take 1,000 Units by mouth once daily.      hydroCHLOROthiazide (MICROZIDE) 12.5 mg capsule TAKE 1 CAPSULE(12.5 MG) BY MOUTH EVERY DAY 30 capsule 0     No current facility-administered medications on file prior to visit.       Allergies:  Review of patient's allergies indicates:  No Known Allergies    Review of Systems   Constitutional:  Negative for fever.   Respiratory:  Negative for shortness of breath.    Cardiovascular:  Negative for chest pain and leg swelling.   Genitourinary:  Negative for dysuria and frequency.   Neurological:  Negative for dizziness and headaches.     Exam:  Vitals:    02/06/23 1321   BP: 138/72   Pulse: 72   Resp: 20   Temp: 97.5 °F (36.4 °C)     Weight: 62.3 kg (137 lb 5.6 oz)   Body mass index is 18.63 kg/m².      Physical Exam  Vitals reviewed.   Constitutional:       General: He is not in acute distress.     Appearance: He is well-developed. He is not ill-appearing.   Eyes:      General: No scleral icterus.  Cardiovascular:      Rate and Rhythm: Normal rate and regular rhythm.      Heart sounds: Normal heart sounds.   Pulmonary:      Effort: Pulmonary effort is normal. No respiratory distress.      Breath sounds: Normal breath sounds.   Skin:     General: Skin is warm and dry.   Neurological:      Mental Status: He is alert and oriented to person, place, and time.   Psychiatric:         Behavior: Behavior normal.       Assessment:  The primary encounter diagnosis was Essential hypertension. Diagnoses of Hyperlipidemia LDL goal <130, Elevated PSA, and Left renal mass were also pertinent to this visit.    Plan:    Essential hypertension  -     refill hydroCHLOROthiazide (MICROZIDE) 12.5 mg capsule; Take 1 capsule (12.5 mg total) by mouth once daily.  Dispense: 30 capsule; Refill: 11  -     Comprehensive Metabolic Panel; Standing  -     Lipid Panel; Standing    Hyperlipidemia LDL goal <130  -     Continue  atorvastatin  -     Comprehensive Metabolic Panel; Standing  -     Lipid Panel; Standing    Elevated PSA  -     f/u with urology     Left renal mass  -     f/u with urology     Follow up in about 6 months (around 8/6/2023).

## 2023-02-07 DIAGNOSIS — Z00.00 ENCOUNTER FOR MEDICARE ANNUAL WELLNESS EXAM: ICD-10-CM

## 2023-02-09 ENCOUNTER — TELEPHONE (OUTPATIENT)
Dept: GASTROENTEROLOGY | Facility: CLINIC | Age: 73
End: 2023-02-09
Payer: MEDICARE

## 2023-02-09 DIAGNOSIS — Z00.00 ENCOUNTER FOR MEDICARE ANNUAL WELLNESS EXAM: ICD-10-CM

## 2023-02-09 NOTE — TELEPHONE ENCOUNTER
Recall letter was created for patient to get a follow up appointment with Dr. Lomeli for November, 2023.

## 2023-02-09 NOTE — TELEPHONE ENCOUNTER
----- Message from Delores Lomeli MD sent at 2/9/2023  3:20 PM CST -----  Regarding: Needs follow up with me November 2023  Can you send a recall letter to Mr. Young to have him follow up with me in November 2023?  Virtual visit is fine.  Thanks!    Delores

## 2023-05-09 DIAGNOSIS — E78.2 HYPERLIPIDEMIA, MIXED: ICD-10-CM

## 2023-05-09 RX ORDER — ATORVASTATIN CALCIUM 40 MG/1
TABLET, FILM COATED ORAL
Qty: 90 TABLET | Refills: 1 | Status: SHIPPED | OUTPATIENT
Start: 2023-05-09 | End: 2023-11-06

## 2023-05-09 NOTE — TELEPHONE ENCOUNTER
Refill Routing Note   Medication(s) are not appropriate for processing by [facility] for the following reason(s):      Required labs outdated    [facility] action(s):  Defer Labs due          Appointments  past 12m or future 3m with PCP    Date Provider   Last Visit   2/6/2023 Malorie Madera, DO   Next Visit   Visit date not found Malorie Madera, DO   ED visits in past 90 days: 0        Note composed:2:15 PM 05/09/2023

## 2023-05-29 ENCOUNTER — PES CALL (OUTPATIENT)
Dept: ADMINISTRATIVE | Facility: CLINIC | Age: 73
End: 2023-05-29
Payer: MEDICARE

## 2023-07-12 ENCOUNTER — OFFICE VISIT (OUTPATIENT)
Dept: UROLOGY | Facility: CLINIC | Age: 73
End: 2023-07-12
Payer: MEDICARE

## 2023-07-12 ENCOUNTER — LAB VISIT (OUTPATIENT)
Dept: LAB | Facility: HOSPITAL | Age: 73
End: 2023-07-12
Attending: UROLOGY
Payer: MEDICARE

## 2023-07-12 VITALS
SYSTOLIC BLOOD PRESSURE: 135 MMHG | DIASTOLIC BLOOD PRESSURE: 87 MMHG | RESPIRATION RATE: 18 BRPM | WEIGHT: 135.94 LBS | HEART RATE: 76 BPM | BODY MASS INDEX: 18.43 KG/M2

## 2023-07-12 DIAGNOSIS — N20.0 NEPHROLITHIASIS: ICD-10-CM

## 2023-07-12 DIAGNOSIS — N28.89 LEFT RENAL MASS: ICD-10-CM

## 2023-07-12 DIAGNOSIS — R91.1 PULMONARY NODULE: ICD-10-CM

## 2023-07-12 DIAGNOSIS — R97.20 ELEVATED PSA: Primary | ICD-10-CM

## 2023-07-12 DIAGNOSIS — R97.20 ELEVATED PSA: ICD-10-CM

## 2023-07-12 LAB
COMPLEXED PSA SERPL-MCNC: 10.7 NG/ML (ref 0–4)
CREAT SERPL-MCNC: 1 MG/DL (ref 0.5–1.4)
EST. GFR  (NO RACE VARIABLE): >60 ML/MIN/1.73 M^2

## 2023-07-12 PROCEDURE — 3075F PR MOST RECENT SYSTOLIC BLOOD PRESS GE 130-139MM HG: ICD-10-PCS | Mod: HCNC,CPTII,S$GLB, | Performed by: UROLOGY

## 2023-07-12 PROCEDURE — 3008F PR BODY MASS INDEX (BMI) DOCUMENTED: ICD-10-PCS | Mod: HCNC,CPTII,S$GLB, | Performed by: UROLOGY

## 2023-07-12 PROCEDURE — 3075F SYST BP GE 130 - 139MM HG: CPT | Mod: HCNC,CPTII,S$GLB, | Performed by: UROLOGY

## 2023-07-12 PROCEDURE — 99999 PR PBB SHADOW E&M-EST. PATIENT-LVL IV: ICD-10-PCS | Mod: PBBFAC,HCNC,, | Performed by: UROLOGY

## 2023-07-12 PROCEDURE — 3288F FALL RISK ASSESSMENT DOCD: CPT | Mod: HCNC,CPTII,S$GLB, | Performed by: UROLOGY

## 2023-07-12 PROCEDURE — 1126F AMNT PAIN NOTED NONE PRSNT: CPT | Mod: HCNC,CPTII,S$GLB, | Performed by: UROLOGY

## 2023-07-12 PROCEDURE — 3008F BODY MASS INDEX DOCD: CPT | Mod: HCNC,CPTII,S$GLB, | Performed by: UROLOGY

## 2023-07-12 PROCEDURE — 99214 PR OFFICE/OUTPT VISIT, EST, LEVL IV, 30-39 MIN: ICD-10-PCS | Mod: HCNC,S$GLB,, | Performed by: UROLOGY

## 2023-07-12 PROCEDURE — 84153 ASSAY OF PSA TOTAL: CPT | Mod: HCNC | Performed by: UROLOGY

## 2023-07-12 PROCEDURE — 82565 ASSAY OF CREATININE: CPT | Mod: HCNC | Performed by: UROLOGY

## 2023-07-12 PROCEDURE — 3079F DIAST BP 80-89 MM HG: CPT | Mod: HCNC,CPTII,S$GLB, | Performed by: UROLOGY

## 2023-07-12 PROCEDURE — 99214 OFFICE O/P EST MOD 30 MIN: CPT | Mod: HCNC,S$GLB,, | Performed by: UROLOGY

## 2023-07-12 PROCEDURE — 3288F PR FALLS RISK ASSESSMENT DOCUMENTED: ICD-10-PCS | Mod: HCNC,CPTII,S$GLB, | Performed by: UROLOGY

## 2023-07-12 PROCEDURE — 1101F PR PT FALLS ASSESS DOC 0-1 FALLS W/OUT INJ PAST YR: ICD-10-PCS | Mod: HCNC,CPTII,S$GLB, | Performed by: UROLOGY

## 2023-07-12 PROCEDURE — 1126F PR PAIN SEVERITY QUANTIFIED, NO PAIN PRESENT: ICD-10-PCS | Mod: HCNC,CPTII,S$GLB, | Performed by: UROLOGY

## 2023-07-12 PROCEDURE — 1101F PT FALLS ASSESS-DOCD LE1/YR: CPT | Mod: HCNC,CPTII,S$GLB, | Performed by: UROLOGY

## 2023-07-12 PROCEDURE — 3079F PR MOST RECENT DIASTOLIC BLOOD PRESSURE 80-89 MM HG: ICD-10-PCS | Mod: HCNC,CPTII,S$GLB, | Performed by: UROLOGY

## 2023-07-12 PROCEDURE — 36415 COLL VENOUS BLD VENIPUNCTURE: CPT | Mod: HCNC | Performed by: UROLOGY

## 2023-07-12 PROCEDURE — 1159F MED LIST DOCD IN RCRD: CPT | Mod: HCNC,CPTII,S$GLB, | Performed by: UROLOGY

## 2023-07-12 PROCEDURE — 99999 PR PBB SHADOW E&M-EST. PATIENT-LVL IV: CPT | Mod: PBBFAC,HCNC,, | Performed by: UROLOGY

## 2023-07-12 PROCEDURE — 1159F PR MEDICATION LIST DOCUMENTED IN MEDICAL RECORD: ICD-10-PCS | Mod: HCNC,CPTII,S$GLB, | Performed by: UROLOGY

## 2023-07-12 NOTE — PROGRESS NOTES
Chief Complaint:   Encounter Diagnoses   Name Primary?    Elevated PSA Yes    Left renal mass     Pulmonary nodule     Nephrolithiasis        HPI:  72-year-old gentleman who comes in to be established by our office.  Known history of renal lesion suspicious for possible renal cell carcinoma and elevated PSA with a normal MRI.  Patient currently is on over-the-counter medications, with no evidence of lower urinary tract symptoms.  No gross hematuria, does have a history of smoking.  He does not have to get up at night to void, he is a good stream, no evidence of urgency.  No other urological history, no family history of urological cancers or stones.  Of note he did have an incidentally found small stone in the kidney.    Allergies:  Patient has no known allergies.    Medications:  See MAR    Review of Systems:  General: No fever, chills, fatigability, or weight loss.  Skin: No rashes, itching, or changes in color or texture of skin.  Chest: Denies SEGOVIA, cyanosis, wheezing, cough, and sputum production.  Abdomen: Appetite fine. No weight loss. Denies diarrhea, abdominal pain, hematemesis, or blood in stool.  Musculoskeletal: No joint stiffness or swelling. Denies back pain.  : As above.  All other review of systems negative.    PMH:   has a past medical history of Achalasia (11/7/2022), Hypertension, and Mixed hyperlipidemia.    PSH:   has a past surgical history that includes Esophagogastroduodenoscopy (N/A, 10/26/2022); Colonoscopy (N/A, 10/26/2022); Esophageal manometry with measurement of impedance (N/A, 11/07/2022); Esophagogastroduodenoscopy (N/A, 11/30/2022); and myotomy, peroral, endoscopic (N/A, 11/29/2022).    FamHx: family history includes Diabetes in his father and sister; Heart failure in his father.    SocHx:  reports that he quit smoking about a year ago. His smoking use included cigars and cigarettes. He started smoking about 5 years ago. He has never used smokeless tobacco. He reports current  alcohol use of about 1.0 standard drink per week. He reports that he does not currently use drugs after having used the following drugs: Marijuana.      Physical Exam:  Vitals:    07/12/23 0954   BP: 135/87   Pulse: 76   Resp: 18     General: A&Ox3, no apparent distress, no deformities  Neck: No masses, normal ROM  Lungs: normal inspiration, no use of accessory muscles  Heart: normal pulse, no arrhythmias  Abdomen: Soft, NT, ND, no masses, no hernias, no hepatosplenomegaly  Skin: The skin is warm and dry. No jaundice.  Ext: No c/c/e.    Labs/Studies:   PSA 8.2 11/22  PSA 6.6 4/21  PSA 5.3 8/20  Creatinine 0.9 12/22  MRI PI-RADS 2 43g 11/22  CT with and without contrast right 6 mm lung nodule, heterogenously enhancing left 1.5 cm renal mass, 4 mm right renal stone 11/22    Impression/Plan:     1. Left renal mass- patient has been on active surveillance in [place] and now being followed by us.  CT scan today and proceed as appropriate, he would rather continue active surveillance, unless there is growth and then may consider cryotherapy.  Follow back up in 6 months with repeat imaging to be scheduled at that time.      2. Elevated PSA- appears to be stable, MRI was negative.  Follow-up on PSA and proceed as appropriate, no biopsy as of yet.  Repeat in 6 months.      3. Pulmonary nodule- CT pending from today and proceed as appropriate, may require pulmonary consultation.    4. Renal stone- incidentally found, no history of passage, call with any issues.

## 2023-07-13 ENCOUNTER — PATIENT MESSAGE (OUTPATIENT)
Dept: UROLOGY | Facility: CLINIC | Age: 73
End: 2023-07-13
Payer: MEDICARE

## 2023-07-24 ENCOUNTER — HOSPITAL ENCOUNTER (OUTPATIENT)
Dept: RADIOLOGY | Facility: HOSPITAL | Age: 73
Discharge: HOME | End: 2023-07-24
Attending: UROLOGY
Payer: MEDICARE

## 2023-07-24 DIAGNOSIS — R91.1 PULMONARY NODULE: ICD-10-CM

## 2023-07-24 DIAGNOSIS — N28.89 LEFT RENAL MASS: ICD-10-CM

## 2023-07-24 PROCEDURE — 74170 CT ABDOMEN W WO CONTRAST: ICD-10-PCS | Mod: 26,HCNC,, | Performed by: RADIOLOGY

## 2023-07-24 PROCEDURE — 71250 CT CHEST WITHOUT CONTRAST: ICD-10-PCS | Mod: 26,HCNC,, | Performed by: RADIOLOGY

## 2023-07-24 PROCEDURE — 25500020 PHARM REV CODE 255: Mod: HCNC | Performed by: UROLOGY

## 2023-07-24 PROCEDURE — 74170 CT ABD WO CNTRST FLWD CNTRST: CPT | Mod: 26,HCNC,, | Performed by: RADIOLOGY

## 2023-07-24 PROCEDURE — 71250 CT THORAX DX C-: CPT | Mod: TC,HCNC

## 2023-07-24 PROCEDURE — 74170 CT ABD WO CNTRST FLWD CNTRST: CPT | Mod: TC,HCNC

## 2023-07-24 PROCEDURE — 71250 CT THORAX DX C-: CPT | Mod: 26,HCNC,, | Performed by: RADIOLOGY

## 2023-07-24 RX ADMIN — IOHEXOL 100 ML: 350 INJECTION, SOLUTION INTRAVENOUS at 02:07

## 2023-09-18 NOTE — TELEPHONE ENCOUNTER
Care Due:                  Date            Visit Type   Department     Provider  --------------------------------------------------------------------------------                                EP -                              PRIMARY      [facility]  Last Visit: 02-      CARE ([facility])   MEDICINE       Malorie Madera  Next Visit: None Scheduled  None         None Found                                                            Last  Test          Frequency    Reason                     Performed    Due Date  --------------------------------------------------------------------------------    Lipid Panel.  12 months..  atorvastatin.............  Not Found    Overdue    [employer] Embedded Care Due Messages. Reference number: [id number].   5/09/2023 2:06:16 PM CDT   Date Of Previous Biopsy (Optional): 8/17/2023

## 2023-10-18 ENCOUNTER — OFFICE VISIT (OUTPATIENT)
Dept: UROLOGY | Facility: CLINIC | Age: 73
End: 2023-10-18
Payer: MEDICARE

## 2023-10-18 VITALS
WEIGHT: 135 LBS | SYSTOLIC BLOOD PRESSURE: 135 MMHG | BODY MASS INDEX: 17.89 KG/M2 | DIASTOLIC BLOOD PRESSURE: 76 MMHG | HEART RATE: 76 BPM | HEIGHT: 73 IN

## 2023-10-18 DIAGNOSIS — N20.0 NEPHROLITHIASIS: ICD-10-CM

## 2023-10-18 DIAGNOSIS — R91.1 PULMONARY NODULE: ICD-10-CM

## 2023-10-18 DIAGNOSIS — R97.20 ELEVATED PSA: Primary | ICD-10-CM

## 2023-10-18 DIAGNOSIS — N28.89 LEFT RENAL MASS: ICD-10-CM

## 2023-10-18 PROCEDURE — 3008F BODY MASS INDEX DOCD: CPT | Mod: HCNC,CPTII,S$GLB, | Performed by: UROLOGY

## 2023-10-18 PROCEDURE — 3078F PR MOST RECENT DIASTOLIC BLOOD PRESSURE < 80 MM HG: ICD-10-PCS | Mod: HCNC,CPTII,S$GLB, | Performed by: UROLOGY

## 2023-10-18 PROCEDURE — 1159F PR MEDICATION LIST DOCUMENTED IN MEDICAL RECORD: ICD-10-PCS | Mod: HCNC,CPTII,S$GLB, | Performed by: UROLOGY

## 2023-10-18 PROCEDURE — 1160F PR REVIEW ALL MEDS BY PRESCRIBER/CLIN PHARMACIST DOCUMENTED: ICD-10-PCS | Mod: HCNC,CPTII,S$GLB, | Performed by: UROLOGY

## 2023-10-18 PROCEDURE — 99214 PR OFFICE/OUTPT VISIT, EST, LEVL IV, 30-39 MIN: ICD-10-PCS | Mod: HCNC,S$GLB,, | Performed by: UROLOGY

## 2023-10-18 PROCEDURE — 3075F PR MOST RECENT SYSTOLIC BLOOD PRESS GE 130-139MM HG: ICD-10-PCS | Mod: HCNC,CPTII,S$GLB, | Performed by: UROLOGY

## 2023-10-18 PROCEDURE — 1159F MED LIST DOCD IN RCRD: CPT | Mod: HCNC,CPTII,S$GLB, | Performed by: UROLOGY

## 2023-10-18 PROCEDURE — 3078F DIAST BP <80 MM HG: CPT | Mod: HCNC,CPTII,S$GLB, | Performed by: UROLOGY

## 2023-10-18 PROCEDURE — 1160F RVW MEDS BY RX/DR IN RCRD: CPT | Mod: HCNC,CPTII,S$GLB, | Performed by: UROLOGY

## 2023-10-18 PROCEDURE — 3075F SYST BP GE 130 - 139MM HG: CPT | Mod: HCNC,CPTII,S$GLB, | Performed by: UROLOGY

## 2023-10-18 PROCEDURE — 1101F PR PT FALLS ASSESS DOC 0-1 FALLS W/OUT INJ PAST YR: ICD-10-PCS | Mod: HCNC,CPTII,S$GLB, | Performed by: UROLOGY

## 2023-10-18 PROCEDURE — 3008F PR BODY MASS INDEX (BMI) DOCUMENTED: ICD-10-PCS | Mod: HCNC,CPTII,S$GLB, | Performed by: UROLOGY

## 2023-10-18 PROCEDURE — 3288F PR FALLS RISK ASSESSMENT DOCUMENTED: ICD-10-PCS | Mod: HCNC,CPTII,S$GLB, | Performed by: UROLOGY

## 2023-10-18 PROCEDURE — 99999 PR PBB SHADOW E&M-EST. PATIENT-LVL III: ICD-10-PCS | Mod: PBBFAC,HCNC,, | Performed by: UROLOGY

## 2023-10-18 PROCEDURE — 1101F PT FALLS ASSESS-DOCD LE1/YR: CPT | Mod: HCNC,CPTII,S$GLB, | Performed by: UROLOGY

## 2023-10-18 PROCEDURE — 99214 OFFICE O/P EST MOD 30 MIN: CPT | Mod: HCNC,S$GLB,, | Performed by: UROLOGY

## 2023-10-18 PROCEDURE — 99999 PR PBB SHADOW E&M-EST. PATIENT-LVL III: CPT | Mod: PBBFAC,HCNC,, | Performed by: UROLOGY

## 2023-10-18 PROCEDURE — 3288F FALL RISK ASSESSMENT DOCD: CPT | Mod: HCNC,CPTII,S$GLB, | Performed by: UROLOGY

## 2023-10-18 NOTE — PROGRESS NOTES
Chief Complaint:   Encounter Diagnoses   Name Primary?    Elevated PSA Yes    Left renal mass     Pulmonary nodule     Nephrolithiasis        HPI:    10/18/23- patient returns today to discuss imaging and labs, PSA has risen.  No evidence of stone pain, currently voiding well.    72-year-old gentleman who comes in to be established by our office.  Known history of renal lesion suspicious for possible renal cell carcinoma and elevated PSA with a normal MRI.  Patient currently is on over-the-counter medications, with no evidence of lower urinary tract symptoms.  No gross hematuria, does have a history of smoking.  He does not have to get up at night to void, he is a good stream, no evidence of urgency.  No other urological history, no family history of urological cancers or stones.  Of note he did have an incidentally found small stone in the kidney.    Allergies:  Patient has no known allergies.    Medications:  See MAR    Review of Systems:  General: No fever, chills, fatigability, or weight loss.  Skin: No rashes, itching, or changes in color or texture of skin.  Chest: Denies SEGOVIA, cyanosis, wheezing, cough, and sputum production.  Abdomen: Appetite fine. No weight loss. Denies diarrhea, abdominal pain, hematemesis, or blood in stool.  Musculoskeletal: No joint stiffness or swelling. Denies back pain.  : As above.  All other review of systems negative.    PMH:   has a past medical history of Achalasia (11/7/2022), Hypertension, and Mixed hyperlipidemia.    PSH:   has a past surgical history that includes Esophagogastroduodenoscopy (N/A, 10/26/2022); Colonoscopy (N/A, 10/26/2022); Esophageal manometry with measurement of impedance (N/A, 11/07/2022); Esophagogastroduodenoscopy (N/A, 11/30/2022); and myotomy, peroral, endoscopic (N/A, 11/29/2022).    FamHx: family history includes Diabetes in his father and sister; Heart failure in his father.    SocHx:  reports that he quit smoking about 14 months ago. His smoking  use included cigars and cigarettes. He started smoking about 5 years ago. He has never used smokeless tobacco. He reports current alcohol use of about 1.0 standard drink of alcohol per week. He reports that he does not currently use drugs after having used the following drugs: Marijuana.      Physical Exam:  Vitals:    10/18/23 1505   BP: 135/76   Pulse: 76     General: A&Ox3, no apparent distress, no deformities  Neck: No masses, normal ROM  Lungs: normal inspiration, no use of accessory muscles  Heart: normal pulse, no arrhythmias  Abdomen: Soft, NT, ND, no masses, no hernias, no hepatosplenomegaly  Skin: The skin is warm and dry. No jaundice.  Ext: No c/c/e.    Labs/Studies:   PSA 10.7 7/23  PSA 8.2 11/22  PSA 6.6 4/21  PSA 5.3 8/20  Creatinine 0.9 12/22  CT renal protocol 1.2 cm left AML, right renal stone 7/23  CT chest without contrast possible emphysema, nodule has disappeared 7/23  MRI PI-RADS 2 43g 11/22  CT with and without contrast right 6 mm lung nodule, heterogenously enhancing left 1.5 cm renal mass, 4 mm right renal stone 11/22    Impression/Plan:       1. Left AML- patient has been on active surveillance in [place] and now being followed by us.  CT demonstrates stability, continue yearly assessments.      2. Elevated PSA- MRI was negative, no family history of prostate cancer, no voiding complaints, no biopsies as of yet though.  PSA continues to rise.  I have recommended a biopsy and discussed this in detail, at this point he wants to continue to follow.  Therefore see me in 6 months with a chest x-ray and a PSA, call with any complaints prior to that appointment.      3. Pulmonary nodule- CT demonstrates no evidence of a recurrent nodule, will follow with routine chest x-rays though due to findings of possible emphysema.      4. Renal stone- incidentally found, no history of passage, call with any issues.

## 2023-10-27 ENCOUNTER — TELEPHONE (OUTPATIENT)
Dept: UROLOGY | Facility: CLINIC | Age: 73
End: 2023-10-27
Payer: MEDICARE

## 2023-10-27 NOTE — TELEPHONE ENCOUNTER
Returned pt call there was no answer       ----- Message from Gabriel Hercules sent at 10/27/2023 10:07 AM CDT -----  Contact: [phone] l  Type:  Sooner Apoointment Request    Caller is requesting a sooner appointment.  Caller declined first available appointment listed below.  Caller will not accept being placed on the waitlist and is requesting a message be sent to doctor.  Name of Caller:Jose   When is the first available appointment?04/19/2024  Symptoms:psa/ f/u  Would the patient rather a call back or a response via [facility]? Call back   Best Call Back Number:[phone]   Additional Information: wants to see dr muñoz

## 2023-11-02 DIAGNOSIS — E78.2 HYPERLIPIDEMIA, MIXED: ICD-10-CM

## 2023-11-02 NOTE — TELEPHONE ENCOUNTER
Refill Routing Note   Medication(s) are not appropriate for processing by [facility] for the following reason(s):      Required labs outdated    [facility] action(s):  Defer Care Due:  Labs due            Appointments  past 12m or future 3m with PCP    Date Provider   Last Visit   2/6/2023 Malorie Madera DO   Next Visit   Visit date not found Malorie Madera DO   ED visits in past 90 days: 0        Note composed:12:39 PM 11/02/2023

## 2023-11-02 NOTE — TELEPHONE ENCOUNTER
Care Due:                  Date            Visit Type   Department     Provider  --------------------------------------------------------------------------------                                EP -                              PRIMARY      [facility]  Last Visit: 02-      CARE ([facility])   MEDICINE       Malorie Madera  Next Visit: None Scheduled  None         None Found                                                            Last  Test          Frequency    Reason                     Performed    Due Date  --------------------------------------------------------------------------------    CMP.........  12 months..  atorvastatin,              12- 12-                             hydroCHLOROthiazide......    Lipid Panel.  12 months..  atorvastatin.............  Not Found    Overdue    [employer] Embedded Care Due Messages. Reference number: [id number].   11/02/2023 4:16:48 AM CDT

## 2023-11-06 RX ORDER — ATORVASTATIN CALCIUM 40 MG/1
TABLET, FILM COATED ORAL
Qty: 90 TABLET | Refills: 0 | Status: SHIPPED | OUTPATIENT
Start: 2023-11-06 | End: 2024-02-27

## 2023-11-06 NOTE — TELEPHONE ENCOUNTER
Patient requesting refills for atorvastatin but is overdue for a lipid panel. Please schedule.   Refill approved x 1.

## 2024-04-24 ENCOUNTER — OFFICE VISIT (OUTPATIENT)
Dept: UROLOGY | Facility: CLINIC | Age: 74
End: 2024-04-24
Payer: MEDICARE

## 2024-04-24 ENCOUNTER — HOSPITAL ENCOUNTER (OUTPATIENT)
Dept: RADIOLOGY | Facility: HOSPITAL | Age: 74
Discharge: HOME | End: 2024-04-24
Attending: UROLOGY
Payer: MEDICARE

## 2024-04-24 VITALS
BODY MASS INDEX: 16.68 KG/M2 | WEIGHT: 125.88 LBS | HEART RATE: 81 BPM | HEIGHT: 73 IN | DIASTOLIC BLOOD PRESSURE: 70 MMHG | SYSTOLIC BLOOD PRESSURE: 109 MMHG

## 2024-04-24 DIAGNOSIS — R91.1 PULMONARY NODULE: ICD-10-CM

## 2024-04-24 DIAGNOSIS — N20.0 NEPHROLITHIASIS: ICD-10-CM

## 2024-04-24 DIAGNOSIS — N28.89 LEFT RENAL MASS: ICD-10-CM

## 2024-04-24 DIAGNOSIS — R97.20 ELEVATED PSA: Primary | ICD-10-CM

## 2024-04-24 PROCEDURE — 1101F PT FALLS ASSESS-DOCD LE1/YR: CPT | Mod: CPTII,S$GLB,, | Performed by: UROLOGY

## 2024-04-24 PROCEDURE — 3288F FALL RISK ASSESSMENT DOCD: CPT | Mod: CPTII,S$GLB,, | Performed by: UROLOGY

## 2024-04-24 PROCEDURE — 1126F AMNT PAIN NOTED NONE PRSNT: CPT | Mod: CPTII,S$GLB,, | Performed by: UROLOGY

## 2024-04-24 PROCEDURE — 3008F BODY MASS INDEX DOCD: CPT | Mod: CPTII,S$GLB,, | Performed by: UROLOGY

## 2024-04-24 PROCEDURE — 3078F DIAST BP <80 MM HG: CPT | Mod: CPTII,S$GLB,, | Performed by: UROLOGY

## 2024-04-24 PROCEDURE — 71045 X-RAY EXAM CHEST 1 VIEW: CPT | Mod: 26,,, | Performed by: RADIOLOGY

## 2024-04-24 PROCEDURE — 1159F MED LIST DOCD IN RCRD: CPT | Mod: CPTII,S$GLB,, | Performed by: UROLOGY

## 2024-04-24 PROCEDURE — 99214 OFFICE O/P EST MOD 30 MIN: CPT | Mod: S$GLB,,, | Performed by: UROLOGY

## 2024-04-24 PROCEDURE — 71045 X-RAY EXAM CHEST 1 VIEW: CPT | Mod: TC

## 2024-04-24 PROCEDURE — 1160F RVW MEDS BY RX/DR IN RCRD: CPT | Mod: CPTII,S$GLB,, | Performed by: UROLOGY

## 2024-04-24 PROCEDURE — 99213 OFFICE O/P EST LOW 20 MIN: CPT | Mod: PBBFAC,25 | Performed by: UROLOGY

## 2024-04-24 PROCEDURE — 99999 PR PBB SHADOW E&M-EST. PATIENT-LVL III: CPT | Mod: PBBFAC,,, | Performed by: UROLOGY

## 2024-04-24 PROCEDURE — 3074F SYST BP LT 130 MM HG: CPT | Mod: CPTII,S$GLB,, | Performed by: UROLOGY

## 2024-04-24 NOTE — PROGRESS NOTES
Chief Complaint:   Encounter Diagnoses   Name Primary?    Elevated PSA Yes    Left renal mass     Pulmonary nodule     Nephrolithiasis        HPI:    4/24/24- patient reports today in follow-up his still not received his chest x-ray are PSA.  Otherwise voiding well with no complaints, no evidence of stone pain.    72-year-old gentleman who comes in to be established by our office.  Known history of renal lesion suspicious for possible renal cell carcinoma and elevated PSA with a normal MRI.  Patient currently is on over-the-counter medications, with no evidence of lower urinary tract symptoms.  No gross hematuria, does have a history of smoking.  He does not have to get up at night to void, he is a good stream, no evidence of urgency.  No other urological history, no family history of urological cancers or stones.  Of note he did have an incidentally found small stone in the kidney.    Allergies:  Patient has no known allergies.    Medications:  See MAR    Review of Systems:  General: No fever, chills, fatigability, or weight loss.  Skin: No rashes, itching, or changes in color or texture of skin.  Chest: Denies SEGOVIA, cyanosis, wheezing, cough, and sputum production.  Abdomen: Appetite fine. No weight loss. Denies diarrhea, abdominal pain, hematemesis, or blood in stool.  Musculoskeletal: No joint stiffness or swelling. Denies back pain.  : As above.  All other review of systems negative.    PMH:   has a past medical history of Achalasia (11/7/2022), Hypertension, and Mixed hyperlipidemia.    PSH:   has a past surgical history that includes Esophagogastroduodenoscopy (N/A, 10/26/2022); Colonoscopy (N/A, 10/26/2022); Esophageal manometry with measurement of impedance (N/A, 11/07/2022); Esophagogastroduodenoscopy (N/A, 11/30/2022); and myotomy, peroral, endoscopic (N/A, 11/29/2022).    FamHx: family history includes Diabetes in his father and sister; Heart failure in his father.    SocHx:  reports that he quit  smoking about 20 months ago. His smoking use included cigars and cigarettes. He started smoking about 6 years ago. He has never used smokeless tobacco. He reports current alcohol use of about 1.0 standard drink of alcohol per week. He reports that he does not currently use drugs after having used the following drugs: Marijuana.      Physical Exam:  There were no vitals filed for this visit.    General: A&Ox3, no apparent distress, no deformities  Neck: No masses, normal ROM  Lungs: normal inspiration, no use of accessory muscles  Heart: normal pulse, no arrhythmias  Abdomen: Soft, NT, ND, no masses, no hernias, no hepatosplenomegaly  Skin: The skin is warm and dry. No jaundice.  Ext: No c/c/e.    Labs/Studies:   PSA 10.7 7/23  PSA 8.2 11/22  PSA 6.6 4/21  PSA 5.3 8/20  Creatinine 0.9 12/22  CT renal protocol 1.2 cm left AML, right renal stone 7/23  CT chest without contrast possible emphysema, nodule has disappeared 7/23  MRI PI-RADS 2 43g 11/22  CT with and without contrast right 6 mm lung nodule, heterogenously enhancing left 1.5 cm renal mass, 4 mm right renal stone 11/22    Impression/Plan:       1. Left AML- patient has been on active surveillance in [place] and now being followed by us.  CT demonstrates stability, continue yearly assessments and reassess at the next appointment.      2. Elevated PSA- MRI was negative, no family history of prostate cancer, no voiding complaints, no biopsies as of yet though.  PSA has been rising, it is due today.  I have recommended a biopsy and discussed this in detail, at this point he wants to continue to follow.  But is more amenable to proceeding if it goes up again today, therefore we may schedule if it is elevated.  Otherwise if normal see me in 6 months with a PSA and CT scan as stated above.  Call with any complaints in the meantime.      3. Pulmonary nodule- CT previously demonstrated no evidence of a recurrent nodule, patient is post due for a chest x-ray and  will proceed today.      4. Renal stone- incidentally found, no history of passage, call with any issues.

## 2024-04-25 ENCOUNTER — TELEPHONE (OUTPATIENT)
Dept: UROLOGY | Facility: CLINIC | Age: 74
End: 2024-04-25
Payer: MEDICARE

## 2024-04-25 NOTE — TELEPHONE ENCOUNTER
I called pt and informed him that his PSA was lower than it was in July 2023, but it was still elevated and that Dr. Kaplan recommended a prostate biopsy; pt stated that he would call back;  if he calls back and wants to schedule, please let MD know to send in meds.

## 2024-04-27 NOTE — TELEPHONE ENCOUNTER
"Attempted to speak with patient regarding rescheduling.  Patient became upset and said "I did concetta already."    "
----- Message from Kristopher Hercules sent at 9/21/2020  9:47 AM CDT -----  Regarding: pt  Would like ac all back in regards to rescheduling a appt . Please call back at .[phone] (home)     Thank You,   Kristopher Hercules          
No

## 2024-06-02 DIAGNOSIS — E78.2 HYPERLIPIDEMIA, MIXED: ICD-10-CM

## 2024-06-02 NOTE — TELEPHONE ENCOUNTER
Care Due:                  Date            Visit Type   Department     Provider  --------------------------------------------------------------------------------                                EP -                              PRIMARY      [facility]  Last Visit: 02-      CARE ([facility])   JOHN Madera                              EP -                              PRIMARY      [facility]  Next Visit: 07-      CARE ([facility])   JOHN Madera                                                            Last  Test          Frequency    Reason                     Performed    Due Date  --------------------------------------------------------------------------------    CMP.........  12 months..  atorvastatin,              12- 12-                             hydroCHLOROthiazide......    Lipid Panel.  12 months..  atorvastatin.............  Not Found    Overdue    [employer] Embedded Care Due Messages. Reference number: [id number].   6/02/2024 4:22:47 AM CDT

## 2024-06-03 NOTE — TELEPHONE ENCOUNTER
Refill Routing Note   Medication(s) are not appropriate for processing by [facility] for the following reason(s):        Patient not seen by provider within 15 months  Required labs outdated    [facility] action(s):  Defer     Requires labs : Yes             Appointments  past 12m or future 3m with PCP    Date Provider   Last Visit   2/6/2023 Malorie Madera, DO   Next Visit   7/3/2024 Malorei Madera, DO   ED visits in past 90 days: 0        Note composed:10:36 AM 06/03/2024

## 2024-06-04 RX ORDER — ATORVASTATIN CALCIUM 40 MG/1
40 TABLET, FILM COATED ORAL DAILY
Qty: 90 TABLET | Refills: 0 | Status: SHIPPED | OUTPATIENT
Start: 2024-06-04

## 2024-07-30 PROBLEM — I70.0 ATHEROSCLEROSIS OF AORTA: Status: ACTIVE | Noted: 2024-07-30

## 2024-07-30 PROBLEM — I25.10 CORONARY ATHEROSCLEROSIS: Status: ACTIVE | Noted: 2024-07-30

## 2024-07-30 PROBLEM — J43.9 EMPHYSEMA LUNG: Status: ACTIVE | Noted: 2024-07-30

## 2024-07-31 ENCOUNTER — PATIENT MESSAGE (OUTPATIENT)
Dept: RESEARCH | Facility: HOSPITAL | Age: 74
End: 2024-07-31
Payer: MEDICARE

## 2024-09-12 ENCOUNTER — TELEPHONE (OUTPATIENT)
Dept: SURGERY | Facility: CLINIC | Age: 74
End: 2024-09-12
Payer: MEDICARE

## 2024-09-12 DIAGNOSIS — K22.0 ACHALASIA: Primary | ICD-10-CM

## 2024-09-12 NOTE — TELEPHONE ENCOUNTER
Spoke with patient  He states he didn't have surveillance EGD done because he lives in [place]  Advised we could get that done for him there  He is agreeable to that and I told him someone would reach out to schedule.  Pt verbalized understanding to all and has no further questions at this time.    Will place referral to have EGD done in [place] and Teams message sent to Anika Cantu for scheduling.    Monona back from Anika Cantu.  She will contact patient for scheduling

## 2024-09-20 ENCOUNTER — HOSPITAL ENCOUNTER (OUTPATIENT)
Dept: PREADMISSION TESTING | Facility: HOSPITAL | Age: 74
Discharge: HOME | End: 2024-09-20
Attending: INTERNAL MEDICINE
Payer: MEDICARE

## 2024-09-20 DIAGNOSIS — K22.0 ACHALASIA: ICD-10-CM

## 2024-12-18 ENCOUNTER — HOSPITAL ENCOUNTER (INPATIENT)
Facility: HOSPITAL | Age: 74
LOS: 2 days | Discharge: HOME HEALTH | DRG: 391 | End: 2024-12-21
Attending: EMERGENCY MEDICINE | Admitting: FAMILY MEDICINE
Payer: MEDICARE

## 2024-12-18 DIAGNOSIS — R07.9 CHEST PAIN: ICD-10-CM

## 2024-12-18 DIAGNOSIS — R06.02 SHORTNESS OF BREATH: ICD-10-CM

## 2024-12-18 DIAGNOSIS — R13.10 DYSPHAGIA, UNSPECIFIED TYPE: ICD-10-CM

## 2024-12-18 DIAGNOSIS — N17.9 AKI (ACUTE KIDNEY INJURY): Primary | ICD-10-CM

## 2024-12-18 DIAGNOSIS — K22.0 ACHALASIA, ESOPHAGEAL: ICD-10-CM

## 2024-12-18 PROBLEM — R63.4 WEIGHT LOSS: Status: ACTIVE | Noted: 2024-12-18

## 2024-12-18 PROBLEM — E46 PROTEIN-CALORIE MALNUTRITION: Status: ACTIVE | Noted: 2024-12-18

## 2024-12-18 LAB
ALBUMIN SERPL BCP-MCNC: 4.1 G/DL (ref 3.5–5.2)
ALP SERPL-CCNC: 57 U/L (ref 40–150)
ALT SERPL W/O P-5'-P-CCNC: 26 U/L (ref 10–44)
ANION GAP SERPL CALC-SCNC: 19 MMOL/L (ref 8–16)
AST SERPL-CCNC: 25 U/L (ref 10–40)
BASOPHILS # BLD AUTO: 0.03 K/UL (ref 0–0.2)
BASOPHILS NFR BLD: 0.3 % (ref 0–1.9)
BILIRUB SERPL-MCNC: 0.9 MG/DL (ref 0.1–1)
BNP SERPL-MCNC: 28 PG/ML (ref 0–99)
BUN SERPL-MCNC: 61 MG/DL (ref 8–23)
CALCIUM SERPL-MCNC: 10.5 MG/DL (ref 8.7–10.5)
CHLORIDE SERPL-SCNC: 98 MMOL/L (ref 95–110)
CO2 SERPL-SCNC: 18 MMOL/L (ref 23–29)
CREAT SERPL-MCNC: 1.8 MG/DL (ref 0.5–1.4)
DIFFERENTIAL METHOD BLD: ABNORMAL
EOSINOPHIL # BLD AUTO: 0.1 K/UL (ref 0–0.5)
EOSINOPHIL NFR BLD: 0.7 % (ref 0–8)
ERYTHROCYTE [DISTWIDTH] IN BLOOD BY AUTOMATED COUNT: 14.5 % (ref 11.5–14.5)
EST. GFR  (NO RACE VARIABLE): 39 ML/MIN/1.73 M^2
GLUCOSE SERPL-MCNC: 95 MG/DL (ref 70–110)
HCT VFR BLD AUTO: 49.1 % (ref 40–54)
HGB BLD-MCNC: 16.4 G/DL (ref 14–18)
IMM GRANULOCYTES # BLD AUTO: 0.03 K/UL (ref 0–0.04)
IMM GRANULOCYTES NFR BLD AUTO: 0.3 % (ref 0–0.5)
LYMPHOCYTES # BLD AUTO: 1.4 K/UL (ref 1–4.8)
LYMPHOCYTES NFR BLD: 15.3 % (ref 18–48)
MCH RBC QN AUTO: 29.2 PG (ref 27–31)
MCHC RBC AUTO-ENTMCNC: 33.4 G/DL (ref 32–36)
MCV RBC AUTO: 87 FL (ref 82–98)
MONOCYTES # BLD AUTO: 0.5 K/UL (ref 0.3–1)
MONOCYTES NFR BLD: 5.4 % (ref 4–15)
NEUTROPHILS # BLD AUTO: 7.3 K/UL (ref 1.8–7.7)
NEUTROPHILS NFR BLD: 78 % (ref 38–73)
NRBC BLD-RTO: 0 /100 WBC
PLATELET # BLD AUTO: 285 K/UL (ref 150–450)
PMV BLD AUTO: 9.3 FL (ref 9.2–12.9)
POTASSIUM SERPL-SCNC: 4.4 MMOL/L (ref 3.5–5.1)
PROT SERPL-MCNC: 8.6 G/DL (ref 6–8.4)
RBC # BLD AUTO: 5.62 M/UL (ref 4.6–6.2)
SODIUM SERPL-SCNC: 135 MMOL/L (ref 136–145)
TROPONIN I SERPL DL<=0.01 NG/ML-MCNC: 0.01 NG/ML (ref 0–0.03)
WBC # BLD AUTO: 9.36 K/UL (ref 3.9–12.7)

## 2024-12-18 PROCEDURE — G0378 HOSPITAL OBSERVATION PER HR: HCPCS

## 2024-12-18 PROCEDURE — 93010 ELECTROCARDIOGRAM REPORT: CPT | Mod: ,,, | Performed by: INTERNAL MEDICINE

## 2024-12-18 PROCEDURE — 83880 ASSAY OF NATRIURETIC PEPTIDE: CPT | Performed by: EMERGENCY MEDICINE

## 2024-12-18 PROCEDURE — 25000003 PHARM REV CODE 250: Performed by: EMERGENCY MEDICINE

## 2024-12-18 PROCEDURE — 85025 COMPLETE CBC W/AUTO DIFF WBC: CPT | Performed by: EMERGENCY MEDICINE

## 2024-12-18 PROCEDURE — 25000003 PHARM REV CODE 250: Performed by: NURSE PRACTITIONER

## 2024-12-18 PROCEDURE — 93005 ELECTROCARDIOGRAM TRACING: CPT

## 2024-12-18 PROCEDURE — 99285 EMERGENCY DEPT VISIT HI MDM: CPT | Mod: 25

## 2024-12-18 PROCEDURE — 80053 COMPREHEN METABOLIC PANEL: CPT | Performed by: EMERGENCY MEDICINE

## 2024-12-18 PROCEDURE — S5010 5% DEXTROSE AND 0.45% SALINE: HCPCS | Performed by: NURSE PRACTITIONER

## 2024-12-18 PROCEDURE — 96360 HYDRATION IV INFUSION INIT: CPT

## 2024-12-18 PROCEDURE — 84484 ASSAY OF TROPONIN QUANT: CPT | Performed by: EMERGENCY MEDICINE

## 2024-12-18 RX ORDER — ONDANSETRON HYDROCHLORIDE 2 MG/ML
4 INJECTION, SOLUTION INTRAVENOUS EVERY 8 HOURS PRN
Status: DISCONTINUED | OUTPATIENT
Start: 2024-12-18 | End: 2024-12-21 | Stop reason: HOSPADM

## 2024-12-18 RX ORDER — NALOXONE HCL 0.4 MG/ML
0.02 VIAL (ML) INJECTION
Status: DISCONTINUED | OUTPATIENT
Start: 2024-12-18 | End: 2024-12-21 | Stop reason: HOSPADM

## 2024-12-18 RX ORDER — IBUPROFEN 200 MG
16 TABLET ORAL
Status: DISCONTINUED | OUTPATIENT
Start: 2024-12-18 | End: 2024-12-21 | Stop reason: HOSPADM

## 2024-12-18 RX ORDER — IPRATROPIUM BROMIDE AND ALBUTEROL SULFATE 2.5; .5 MG/3ML; MG/3ML
3 SOLUTION RESPIRATORY (INHALATION) EVERY 6 HOURS PRN
Status: DISCONTINUED | OUTPATIENT
Start: 2024-12-18 | End: 2024-12-21 | Stop reason: HOSPADM

## 2024-12-18 RX ORDER — IBUPROFEN 200 MG
24 TABLET ORAL
Status: DISCONTINUED | OUTPATIENT
Start: 2024-12-18 | End: 2024-12-21 | Stop reason: HOSPADM

## 2024-12-18 RX ORDER — GLUCAGON 1 MG
1 KIT INJECTION
Status: DISCONTINUED | OUTPATIENT
Start: 2024-12-18 | End: 2024-12-21 | Stop reason: HOSPADM

## 2024-12-18 RX ORDER — SODIUM CHLORIDE 0.9 % (FLUSH) 0.9 %
3 SYRINGE (ML) INJECTION EVERY 8 HOURS PRN
Status: DISCONTINUED | OUTPATIENT
Start: 2024-12-18 | End: 2024-12-21 | Stop reason: HOSPADM

## 2024-12-18 RX ORDER — DEXTROSE MONOHYDRATE AND SODIUM CHLORIDE 5; .45 G/100ML; G/100ML
INJECTION, SOLUTION INTRAVENOUS CONTINUOUS
Status: DISCONTINUED | OUTPATIENT
Start: 2024-12-18 | End: 2024-12-20

## 2024-12-18 RX ADMIN — DEXTROSE AND SODIUM CHLORIDE: 5; 450 INJECTION, SOLUTION INTRAVENOUS at 10:12

## 2024-12-18 RX ADMIN — SODIUM CHLORIDE 1000 ML: 0.9 INJECTION, SOLUTION INTRAVENOUS at 08:12

## 2024-12-19 ENCOUNTER — ANESTHESIA EVENT (OUTPATIENT)
Dept: ENDOSCOPY | Facility: HOSPITAL | Age: 74
End: 2024-12-19
Payer: MEDICARE

## 2024-12-19 ENCOUNTER — ANESTHESIA (OUTPATIENT)
Dept: ENDOSCOPY | Facility: HOSPITAL | Age: 74
End: 2024-12-19
Payer: MEDICARE

## 2024-12-19 DIAGNOSIS — K22.0 ACHALASIA, ESOPHAGEAL: Primary | ICD-10-CM

## 2024-12-19 LAB
ALBUMIN SERPL BCP-MCNC: 3.2 G/DL (ref 3.5–5.2)
ALP SERPL-CCNC: 43 U/L (ref 40–150)
ALT SERPL W/O P-5'-P-CCNC: 19 U/L (ref 10–44)
ANION GAP SERPL CALC-SCNC: 8 MMOL/L (ref 8–16)
AST SERPL-CCNC: 20 U/L (ref 10–40)
BASOPHILS # BLD AUTO: 0.03 K/UL (ref 0–0.2)
BASOPHILS NFR BLD: 0.4 % (ref 0–1.9)
BILIRUB SERPL-MCNC: 0.9 MG/DL (ref 0.1–1)
BUN SERPL-MCNC: 46 MG/DL (ref 8–23)
CALCIUM SERPL-MCNC: 8.7 MG/DL (ref 8.7–10.5)
CHLORIDE SERPL-SCNC: 103 MMOL/L (ref 95–110)
CO2 SERPL-SCNC: 23 MMOL/L (ref 23–29)
CREAT SERPL-MCNC: 1.2 MG/DL (ref 0.5–1.4)
DIFFERENTIAL METHOD BLD: ABNORMAL
EOSINOPHIL # BLD AUTO: 0.2 K/UL (ref 0–0.5)
EOSINOPHIL NFR BLD: 2.4 % (ref 0–8)
ERYTHROCYTE [DISTWIDTH] IN BLOOD BY AUTOMATED COUNT: 14.4 % (ref 11.5–14.5)
EST. GFR  (NO RACE VARIABLE): >60 ML/MIN/1.73 M^2
GLUCOSE SERPL-MCNC: 145 MG/DL (ref 70–110)
HCT VFR BLD AUTO: 39.1 % (ref 40–54)
HGB BLD-MCNC: 13.3 G/DL (ref 14–18)
IMM GRANULOCYTES # BLD AUTO: 0.02 K/UL (ref 0–0.04)
IMM GRANULOCYTES NFR BLD AUTO: 0.2 % (ref 0–0.5)
LYMPHOCYTES # BLD AUTO: 1.5 K/UL (ref 1–4.8)
LYMPHOCYTES NFR BLD: 19 % (ref 18–48)
MAGNESIUM SERPL-MCNC: 2.3 MG/DL (ref 1.6–2.6)
MCH RBC QN AUTO: 29.2 PG (ref 27–31)
MCHC RBC AUTO-ENTMCNC: 34 G/DL (ref 32–36)
MCV RBC AUTO: 86 FL (ref 82–98)
MONOCYTES # BLD AUTO: 0.7 K/UL (ref 0.3–1)
MONOCYTES NFR BLD: 8.8 % (ref 4–15)
NEUTROPHILS # BLD AUTO: 5.6 K/UL (ref 1.8–7.7)
NEUTROPHILS NFR BLD: 69.2 % (ref 38–73)
NRBC BLD-RTO: 0 /100 WBC
PLATELET # BLD AUTO: 268 K/UL (ref 150–450)
PMV BLD AUTO: 9 FL (ref 9.2–12.9)
POTASSIUM SERPL-SCNC: 3.5 MMOL/L (ref 3.5–5.1)
PROT SERPL-MCNC: 6.4 G/DL (ref 6–8.4)
RBC # BLD AUTO: 4.56 M/UL (ref 4.6–6.2)
SODIUM SERPL-SCNC: 134 MMOL/L (ref 136–145)
WBC # BLD AUTO: 8.06 K/UL (ref 3.9–12.7)

## 2024-12-19 PROCEDURE — S5010 5% DEXTROSE AND 0.45% SALINE: HCPCS | Performed by: NURSE PRACTITIONER

## 2024-12-19 PROCEDURE — 63600175 PHARM REV CODE 636 W HCPCS: Mod: JZ,JG | Performed by: INTERNAL MEDICINE

## 2024-12-19 PROCEDURE — 97535 SELF CARE MNGMENT TRAINING: CPT

## 2024-12-19 PROCEDURE — 27201028 HC NEEDLE, SCLERO: Performed by: INTERNAL MEDICINE

## 2024-12-19 PROCEDURE — 97116 GAIT TRAINING THERAPY: CPT

## 2024-12-19 PROCEDURE — 96361 HYDRATE IV INFUSION ADD-ON: CPT

## 2024-12-19 PROCEDURE — 97166 OT EVAL MOD COMPLEX 45 MIN: CPT

## 2024-12-19 PROCEDURE — 0DJ08ZZ INSPECTION OF UPPER INTESTINAL TRACT, VIA NATURAL OR ARTIFICIAL OPENING ENDOSCOPIC: ICD-10-PCS | Performed by: INTERNAL MEDICINE

## 2024-12-19 PROCEDURE — 43236 UPPR GI SCOPE W/SUBMUC INJ: CPT | Performed by: INTERNAL MEDICINE

## 2024-12-19 PROCEDURE — 97530 THERAPEUTIC ACTIVITIES: CPT

## 2024-12-19 PROCEDURE — 85025 COMPLETE CBC W/AUTO DIFF WBC: CPT | Performed by: NURSE PRACTITIONER

## 2024-12-19 PROCEDURE — 25000003 PHARM REV CODE 250: Performed by: INTERNAL MEDICINE

## 2024-12-19 PROCEDURE — 37000009 HC ANESTHESIA EA ADD 15 MINS: Performed by: INTERNAL MEDICINE

## 2024-12-19 PROCEDURE — 63600175 PHARM REV CODE 636 W HCPCS: Performed by: NURSE ANESTHETIST, CERTIFIED REGISTERED

## 2024-12-19 PROCEDURE — 36415 COLL VENOUS BLD VENIPUNCTURE: CPT | Performed by: NURSE PRACTITIONER

## 2024-12-19 PROCEDURE — 25000003 PHARM REV CODE 250: Performed by: NURSE ANESTHETIST, CERTIFIED REGISTERED

## 2024-12-19 PROCEDURE — S5010 5% DEXTROSE AND 0.45% SALINE: HCPCS | Performed by: INTERNAL MEDICINE

## 2024-12-19 PROCEDURE — 37000008 HC ANESTHESIA 1ST 15 MINUTES: Performed by: INTERNAL MEDICINE

## 2024-12-19 PROCEDURE — 99222 1ST HOSP IP/OBS MODERATE 55: CPT | Mod: 25,,, | Performed by: INTERNAL MEDICINE

## 2024-12-19 PROCEDURE — 96372 THER/PROPH/DIAG INJ SC/IM: CPT | Performed by: INTERNAL MEDICINE

## 2024-12-19 PROCEDURE — 3E0G8GC INTRODUCTION OF OTHER THERAPEUTIC SUBSTANCE INTO UPPER GI, VIA NATURAL OR ARTIFICIAL OPENING ENDOSCOPIC: ICD-10-PCS | Performed by: INTERNAL MEDICINE

## 2024-12-19 PROCEDURE — 80053 COMPREHEN METABOLIC PANEL: CPT | Performed by: NURSE PRACTITIONER

## 2024-12-19 PROCEDURE — 25000003 PHARM REV CODE 250: Performed by: NURSE PRACTITIONER

## 2024-12-19 PROCEDURE — 83735 ASSAY OF MAGNESIUM: CPT | Performed by: NURSE PRACTITIONER

## 2024-12-19 PROCEDURE — 43236 UPPR GI SCOPE W/SUBMUC INJ: CPT | Mod: ,,, | Performed by: INTERNAL MEDICINE

## 2024-12-19 PROCEDURE — 97162 PT EVAL MOD COMPLEX 30 MIN: CPT

## 2024-12-19 PROCEDURE — 21400001 HC TELEMETRY ROOM

## 2024-12-19 RX ORDER — EPHEDRINE SULFATE 50 MG/ML
INJECTION, SOLUTION INTRAVENOUS
Status: DISCONTINUED | OUTPATIENT
Start: 2024-12-19 | End: 2024-12-19

## 2024-12-19 RX ORDER — LIDOCAINE HYDROCHLORIDE 10 MG/ML
INJECTION, SOLUTION EPIDURAL; INFILTRATION; INTRACAUDAL; PERINEURAL
Status: DISCONTINUED | OUTPATIENT
Start: 2024-12-19 | End: 2024-12-19

## 2024-12-19 RX ORDER — PROPOFOL 10 MG/ML
VIAL (ML) INTRAVENOUS
Status: DISCONTINUED | OUTPATIENT
Start: 2024-12-19 | End: 2024-12-19

## 2024-12-19 RX ADMIN — DEXTROSE AND SODIUM CHLORIDE: 5; 450 INJECTION, SOLUTION INTRAVENOUS at 07:12

## 2024-12-19 RX ADMIN — PROPOFOL 20 MG: 10 INJECTION, EMULSION INTRAVENOUS at 12:12

## 2024-12-19 RX ADMIN — PROPOFOL 30 MG: 10 INJECTION, EMULSION INTRAVENOUS at 12:12

## 2024-12-19 RX ADMIN — PROPOFOL 90 MG: 10 INJECTION, EMULSION INTRAVENOUS at 12:12

## 2024-12-19 RX ADMIN — LIDOCAINE HYDROCHLORIDE 100 MG: 10 SOLUTION INTRAVENOUS at 12:12

## 2024-12-19 RX ADMIN — DEXTROSE AND SODIUM CHLORIDE: 5; 450 INJECTION, SOLUTION INTRAVENOUS at 01:12

## 2024-12-19 RX ADMIN — EPHEDRINE SULFATE 25 MG: 50 INJECTION INTRAVENOUS at 12:12

## 2024-12-19 NOTE — HPI
"Patient is a 74 year old male with past medical history of hypertension, hyperlipidemia, GERD, kidney stones, left renal mass, lung nodule, and achalasia s/p myotomy 11/2022 due to dysphagia (and weight loss of 50 lbs) who presented to ED for evaluation of weakness. Family reports worsening weakness, falls, and difficulty swallowing for past several months. Per family (ex-wife) at bedside, he is not eating or drinking, he says everything "just comes back up." He had been able to tolerate solids, but does say he has no appetite and when he tries to drink liquids including Ensure, he has to spit it out, says he cannot swallow it. He has developed generalized weakness and has had some falls. He denies pain, shortness of breath, chest pain, abdominal pain, fever, chills, diarrhea.   Vital signs are stable while in ED, temp 97.7°, blood pressure 118/75, 100% SpO2 on room air.  Heart rate 82.  Lab workup shows dehydration with CO2 18, BUN 61, creatinine 1.8.  BNP 28, troponin 0.006, normal LFTs.  Chest x-ray demonstrates a lungs are clear.  While in ED, patient was given liquids to drink, was unable to tolerate and vomited right afterwards. Hospital Medicine was consulted for admission due to MALLY and dysphagia.  "

## 2024-12-19 NOTE — ASSESSMENT & PLAN NOTE
Patient's blood pressure range in the last 24 hours was: BP  Min: 105/74  Max: 118/75.The patient's inpatient anti-hypertensive regimen is listed below:  Current Antihypertensives   No longer on medications     Plan  - BP is controlled, no changes needed to their regimen

## 2024-12-19 NOTE — TRANSFER OF CARE
Anesthesia Transfer of Care Note    Patient: Jose Young    Procedure(s) Performed: Procedure(s) (LRB):  EGD w/ botox (N/A)    Patient location: GI    Anesthesia Type: MAC    Transport from OR: Transported from OR on room air with adequate spontaneous ventilation    Post pain: adequate analgesia    Post assessment: no apparent anesthetic complications and tolerated procedure well    Post vital signs: stable    Level of consciousness: responds to stimulation    Nausea/Vomiting: no nausea/vomiting    Complications: none    Transfer of care protocol was followedComments: Report given to PACU RN. Hand Off Tool Used. RN given opportunity to ask questions or clarify concerns. No Concerns verbalized. RN was asked if ready to assume care of patient. RN verbally confirmed. Pt. Left in stable condition. SV. Vital Signs Return to Near Baseline. No s/s of distress noted      Last vitals: Visit Vitals  /67   Pulse 63   Temp 36.5 °C (97.7 °F)   Resp 15   Ht 6' (1.829 m)   Wt 59 kg (130 lb)   SpO2 100%   BMI 17.63 kg/m²

## 2024-12-19 NOTE — H&P (VIEW-ONLY)
[facility]  Gastroenterology  Consult Note    Patient Name: Jose Young  MRN: [id number]  Admission Date: 12/18/2024  Hospital Length of Stay: 0 days  Code Status: Full Code   Attending Provider: Dariela Amezcua MD   Consulting Provider: Yrn Leos PA-C  Primary Care Physician: Malorie Madera DO  Principal Problem:MALLY (acute kidney injury)    Inpatient consult to Gastroenterology  Consult performed by: Yrn Leos PA-C  Consult ordered by: Colette Joseph NP  Reason for consult: Dysphagia; dehydration      Subjective:     HPI:  The patient has a history of Type II Achalasia diagnosed in 2022 and followed by Dr. Lomeli in [place]. S/p POEM on 11/29/22 by Dr. Gates. The patient presented to the ER for weakness. He reports dysphagia to liquids with gradual worsening over the last few months. He is unable to take liquids without vomiting. He can keep solids down but they take time to get down. He denies hematemesis, hematochezia, melena or change in bowel habits. He says a home nurse recently told him he gained 4 pounds. In the ER, CBC was unremarkable. BUN 61, creatinine 1.8, sodium 135, potassium 4.4, albumin 4.1. after hydration, BUN 46 and creatinine 1.2.     Past Medical History:   Diagnosis Date    Achalasia 11/07/2022    GERD (gastroesophageal reflux disease)     Hypertension     Kidney stones     Left renal mass     Lung nodule     Mixed hyperlipidemia     Weight loss        Past Surgical History:   Procedure Laterality Date    COLONOSCOPY N/A 10/26/2022    Procedure: COLONOSCOPY;  Surgeon: Denver Ramos MD;  Location: [facility] ENDO;  Service: Endoscopy;  Laterality: N/A;    ESOPHAGEAL MANOMETRY WITH MEASUREMENT OF IMPEDANCE N/A 11/07/2022    Procedure: MANOMETRY, ESOPHAGUS, WITH IMPEDANCE MEASUREMENT;  Surgeon: Delores Lomeli MD;  Location: [facility] ([address] [place] [address]);  Service: Endoscopy;  Laterality: N/A;  instructions sent to [facility]    ESOPHAGOGASTRODUODENOSCOPY N/A  10/26/2022    Procedure: EGD (ESOPHAGOGASTRODUODENOSCOPY);  Surgeon: Denver Ramos MD;  Location: [facility];  Service: Endoscopy;  Laterality: N/A;  Medically Urgent  10/17 fully vaccinated; instructions to portal-st    ESOPHAGOGASTRODUODENOSCOPY N/A 2022    Procedure: EGD (ESOPHAGOGASTRODUODENOSCOPY) with musocal clip application;  Surgeon: Dianen Aguirre MD;  Location: [address];  Service: General;  Laterality: N/A;    MYOTOMY, PERORAL, ENDOSCOPIC N/A 2022    Procedure: MYOTOMY, PERORAL, ENDOSCOPIC POEM;  Surgeon: Dianne Aguirre MD;  Location: [facility] OR [address] [place] [address];  Service: General;  Laterality: N/A;       Review of patient's allergies indicates:  No Known Allergies  Family History       Problem Relation (Age of Onset)    Diabetes Father, Sister    Heart failure Father          Tobacco Use    Smoking status: Former     Current packs/day: 0.00     Types: Cigars, Cigarettes     Start date:      Quit date: 2022     Years since quittin.3    Smokeless tobacco: Never    Tobacco comments:     Currently uses a non-nicotine vape pen   Substance and Sexual Activity    Alcohol use: Yes     Alcohol/week: 1.0 standard drink of alcohol     Types: 1 Cans of beer per week    Drug use: Not Currently     Types: Marijuana    Sexual activity: Not Currently     Review of Systems   Constitutional:  Negative for fever.   HENT:  Negative for hearing loss.    Eyes:  Negative for visual disturbance.   Respiratory:  Negative for cough and shortness of breath.    Cardiovascular:  Negative for chest pain and palpitations.   Gastrointestinal:         As per HPI.   Genitourinary:  Negative for difficulty urinating, dysuria, frequency and hematuria.   Musculoskeletal:  Negative for arthralgias and back pain.   Skin:  Negative for color change.   Neurological:  Positive for weakness. Negative for seizures, syncope and headaches.   Hematological:  Does not bruise/bleed easily.     Objective:      Vital Signs (Most Recent):  Temp: 97.7 °F (36.5 °C) (12/19/24 0428)  Pulse: (!) 58 (12/19/24 0739)  Resp: 20 (12/19/24 0428)  BP: (!) 110/53 (12/19/24 0500)  SpO2: 99 % (12/19/24 0428) Vital Signs (24h Range):  Temp:  [97.6 °F (36.4 °C)-98 °F (36.7 °C)] 97.7 °F (36.5 °C)  Pulse:  [58-90] 58  Resp:  [14-20] 20  SpO2:  [98 %-100 %] 99 %  BP: ()/(52-75) 110/53     Weight: 59 kg (130 lb) (12/18/24 1642)  Body mass index is 17.63 kg/m².      Intake/Output Summary (Last 24 hours) at 12/19/2024 0920  Last data filed at 12/19/2024 0740  Gross per 24 hour   Intake 0 ml   Output 500 ml   Net -500 ml       Lines/Drains/Airways       Peripheral Intravenous Line  Duration                  Peripheral IV - Single Lumen 12/18/24 2044 20 G Left Antecubital <1 day                     Physical Exam  Constitutional:       General: He is not in acute distress.     Appearance: He is cachectic.   HENT:      Head: Normocephalic and atraumatic.   Eyes:      Extraocular Movements: Extraocular movements intact.   Cardiovascular:      Rate and Rhythm: Normal rate and regular rhythm.      Heart sounds: Normal heart sounds. No murmur heard.  Pulmonary:      Effort: Pulmonary effort is normal. No respiratory distress.      Breath sounds: Normal breath sounds. No wheezing.   Abdominal:      General: Bowel sounds are normal. There is no distension.      Palpations: Abdomen is soft. There is no mass.      Tenderness: There is no abdominal tenderness.   Musculoskeletal:      Cervical back: Normal range of motion and neck supple.      Right lower leg: No edema.      Left lower leg: No edema.   Skin:     General: Skin is warm and dry.      Findings: No rash.   Neurological:      Mental Status: He is alert and oriented to person, place, and time.      Cranial Nerves: No cranial nerve deficit.   Psychiatric:         Behavior: Behavior normal.          Significant Labs:  CBC:   Recent Labs   Lab 12/18/24  1834 12/19/24  0651   WBC 9.36 8.06  "  HGB 16.4 13.3*   HCT 49.1 39.1*    268     CMP:   Recent Labs   Lab 12/19/24  0651   *   CALCIUM 8.7   ALBUMIN 3.2*   PROT 6.4   *   K 3.5   CO2 23      BUN 46*   CREATININE 1.2   ALKPHOS 43   ALT 19   AST 20   BILITOT 0.9     Coagulation: No results for input(s): "PT", "INR", "APTT" in the last 48 hours.    Significant Imaging:  Imaging results within the past 24 hours have been reviewed.  Assessment/Plan:     Renal/  * MALLY (acute kidney injury)  Improving with IV hydration.     Endocrine  Protein-calorie malnutrition  BMI low but albumin 4.     Measurements:  Wt Readings from Last 1 Encounters:   12/18/24 59 kg (130 lb)   Body mass index is 17.63 kg/m².    GI  Achalasia, esophageal  Dx 2022 followed by Dr. Lomeli  S/p POEM by Dr. Gates.  Now with tolerance of solids but not liquids causing dehydration.   Albumin 4 and reportedly some recent weight gain.   Will reach out to Dr. Lomeli in [place] but will likely get an esophagram while here. Will need to arrange follow up [place] with specialists.          Thank you for your consult. I will follow-up with patient. Please contact us if you have any additional questions.    Yrn Leos PA-C  Gastroenterology  [facility]  "

## 2024-12-19 NOTE — PT/OT/SLP EVAL
Occupational Therapy Evaluation and Treatment    Name: Jose Young  MRN: [id number]  Admitting Diagnosis: MALLY (acute kidney injury)  Recent Surgery: Procedure(s) (LRB):  EGD w/ botox (N/A) Day of Surgery    Recommendations:     Discharge Recommendations: Low Intensity Therapy  Level of Assistance Recommended: 24 hours light assistance  Discharge Equipment Recommendations: walker, rolling  Barriers to discharge:      Assessment:     Jose Young is a 74 y.o. male with a medical diagnosis of MALLY (acute kidney injury). He presents with performance deficits affecting function including weakness, impaired balance, decreased safety awareness, impaired endurance, visual deficits, impaired self care skills, impaired functional mobility, decreased upper extremity function, gait instability.     Rehab Prognosis: Good; patient would benefit from acute OT services to address these deficits and reach maximum level of function.    Plan:     Patient to be seen 2 x/week to address the above listed problems via self-care/home management, therapeutic exercises, therapeutic activities  Plan of Care Expires: 01/02/25  Plan of Care Reviewed with: patient    Subjective     Chief Complaint: DEBILITY AND GENERALIZED WEAKNESS  Patient Comments/Goals: GET STRONGER  Pain/Comfort:  Pain Rating 1: 0/10    Patients cultural, spiritual, Mosque conflicts given the current situation:      Social History:  Living Environment: Patient lives alone in a single story home with number of outside stair(s): 0  Prior Level of Function: Prior to admission, patient was independent  Roles and Routines: Patient was driving and not working prior to admission.  Equipment Used at Home: none (Simultaneous filing. User may not have seen previous data.)  DME owned (not currently used): none  Assistance Upon Discharge: family    Objective:     Communicated with nurse and epic chart review prior to session. Patient found HOB elevated with telemetry, peripheral IV  upon OT entry to room.    General Precautions: Standard, NPO, fall   Orthopedic Precautions: N/A   Braces: N/A    Respiratory Status: Room air    Occupational Performance    Gait belt applied - Yes    Bed Mobility:   Rolling/Turning to Left with stand by assistance  Scooting anteriorly to EOB to have both feet planted on floor: stand by assistance  Supine to sit from left side of bed with stand by assistance    Functional Mobility/Transfers:  Sit <> Stand Transfer with minimum assistance with rolling walker  Bed <> Chair Transfer using Step Transfer technique with minimum assistance with rolling walker  Functional Mobility: pt ambulated 200 feet with cga and rolling walker    Activities of Daily Living:  Upper Body Dressing: contact guard assistance  Lower Body Dressing: stand by assistance    Cognitive/Visual Perceptual:  Cognitive/Psychosocial Skills:    -     Oriented to: Person, Place, Time, Situation  -     Follows Commands/attention: Follows multistep  commands  -     Communication: clear/fluent  -     Memory: No Deficits noted  -     Safety awareness/insight to disability: impaired    Physical Exam:  Upper Extremity Range of Motion:     -       Right Upper Extremity: WFL  -       Left Upper Extremity: WFL  Upper Extremity Strength:    -       Right Upper Extremity: mmt: 3/5 grossly  -       Left Upper Extremity: mmt: 3/5 grossly   Strength:    -       Right Upper Extremity: mmt: 3/5 grossly  -       Left Upper Extremity: mmt: 3/5 grossly    AMPAC 6 Click ADL:  AMPAC Total Score: 17    Treatment & Education:  Educated patient on importance of increased tolerance to upright position and direct impact on CV endurance and strength. Patient encouraged to sit up in chair/ EOB, for a minimum of 2 consecutive hours including for all meals.. Encouraged patient to perform AROM TE to BUE throughout the day within all available planes of motion. Re enforced importance of utilizing call light to meet needs in room and  not attempt to get up without staff assistance. Patient verbalized understanding and agreed to comply.        The mobility limitation cannot be sufficiently resolved by the use of a cane.   Patient's functional mobility deficit can be sufficiently resolved with the use of a rolling walker. Patient's mobility limitation significantly impairs their ability to participate in one of more activities of daily living. The use of a rolling walker will significantly improve the patient's ability to participate in MRADLS and the patient will use it on regular basis in the home.     This patient has a mobility limitation that   Prevents them entirely  from accomplishing MRADL's in customary locations in the home   Places them at reasonable determined heightened risk of mobility or mortality secondary to attempts to perform an MRADL   Prevents them from completing MRADL's in a reasonable time frame      Patient clear to stand pivot transfer with RN/PCT, assist xcga with rolling walker .    Patient left up in chair with all lines intact, call button in reach, and chair alarm on.    GOALS:   Multidisciplinary Problems       Occupational Therapy Goals          Problem: Occupational Therapy    Goal Priority Disciplines Outcome Interventions   Occupational Therapy Goal     OT, PT/OT     Description: O.T. GOALS TO BE MET BY 1-2-25  PT WILL TOLERATE 1 SET X 15 REPS B UE ROM EXERCISE   SBA WITH LE DRESSING  SBA WITH ROLLING WALKER TOILET TRANSFER                       History:     Past Medical History:   Diagnosis Date    Achalasia 11/07/2022    GERD (gastroesophageal reflux disease)     Hypertension     Kidney stones     Left renal mass     Lung nodule     Mixed hyperlipidemia     Weight loss          Past Surgical History:   Procedure Laterality Date    COLONOSCOPY N/A 10/26/2022    Procedure: COLONOSCOPY;  Surgeon: Denver Ramos MD;  Location: [facility];  Service: Endoscopy;  Laterality: N/A;    ESOPHAGEAL MANOMETRY WITH MEASUREMENT  OF IMPEDANCE N/A 11/07/2022    Procedure: MANOMETRY, ESOPHAGUS, WITH IMPEDANCE MEASUREMENT;  Surgeon: Delores Lomeli MD;  [facility]  Service: Endoscopy;  Laterality: N/A;  instructions sent to [facility]    ESOPHAGOGASTRODUODENOSCOPY N/A 10/26/2022    Procedure: EGD (ESOPHAGOGASTRODUODENOSCOPY);  Surgeon: Denver Ramos MD;  Location: [facility];  Service: Endoscopy;  Laterality: N/A;  Medically Urgent  10/17 fully vaccinated; instructions to [facility]    ESOPHAGOGASTRODUODENOSCOPY N/A 11/30/2022    Procedure: EGD (ESOPHAGOGASTRODUODENOSCOPY) with musocal clip application;  Surgeon: Dianne Aguirre MD;  Location: [facility] OR [address] [place] [address];  Service: General;  Laterality: N/A;    MYOTOMY, PERORAL, ENDOSCOPIC N/A 11/29/2022    Procedure: MYOTOMY, PERORAL, ENDOSCOPIC POEM;  Surgeon: Dianne Aguirre MD;  Location: [facility] OR [address] [place] [address];  Service: General;  Laterality: N/A;       Time Tracking:     OT Date of Treatment: 12/19/24  OT Start Time: 0955  OT Stop Time: 1020  OT Total Time (min): 25 min    Billable Minutes: Evaluation 15 minutes  and Therapeutic Activity 10 minutes    12/19/2024

## 2024-12-19 NOTE — ANESTHESIA PREPROCEDURE EVALUATION
12/19/2024  Jose Young is a 74 y.o., male.  Patient Active Problem List   Diagnosis    Cigar smoker motivated to quit    Elevated PSA    Hyperlipidemia, mixed    Hypertension, essential    Achalasia, esophageal    Left renal mass    Pulmonary nodule    Nephrolithiasis    Atherosclerosis of aorta    Coronary atherosclerosis    Emphysema lung    Dysphagia    MALLY (acute kidney injury)    Weight loss    Protein-calorie malnutrition         Pre-op Assessment    I have reviewed the Patient Summary Reports.     I have reviewed the Nursing Notes. I have reviewed the NPO Status.   I have reviewed the Medications.     Review of Systems  Anesthesia Hx:  No problems with previous Anesthesia             Denies Family Hx of Anesthesia complications.    Denies Personal Hx of Anesthesia complications.                    Social:  Non-Smoker       Hematology/Oncology:  Hematology Normal   Oncology Normal                                   EENT/Dental:  EENT/Dental Normal           Cardiovascular:     Hypertension   CAD              ECG has been reviewed.                            Pulmonary:   COPD, mild                     Renal/:  Chronic Renal Disease renal calculi               Hepatic/GI:     GERD                Musculoskeletal:  Musculoskeletal Normal                Neurological:  Neurology Normal                                      Endocrine:  Endocrine Normal            Dermatological:  Skin Normal    Psych:  Psychiatric Normal                    Physical Exam  General: Well nourished, Cooperative, Alert and Oriented    Airway:  Mallampati: II   Mouth Opening: Normal  TM Distance: Normal  Tongue: Normal  Neck ROM: Normal ROM    Dental:  Intact  Pt denies loose or removable dentition  Heart:  Rate: Normal  Rhythm: Regular Rhythm        Anesthesia Plan  Type of Anesthesia, risks & benefits discussed:    Anesthesia  Type: MAC, Gen Natural Airway  Intra-op Monitoring Plan: Standard ASA Monitors  Post Op Pain Control Plan: multimodal analgesia  Induction:  IV  Informed Consent: Informed consent signed with the Patient and all parties understand the risks and agree with anesthesia plan.  All questions answered.   ASA Score: 2  Day of Surgery Review of History & Physical: H&P Update referred to the surgeon/provider.I have interviewed and examined the patient. I have reviewed the patient's H&P dated: There are no significant changes.     Ready For Surgery From Anesthesia Perspective.     .

## 2024-12-19 NOTE — ASSESSMENT & PLAN NOTE
Nutrition consulted. Most recent weight and BMI monitored-     Measurements:  Wt Readings from Last 1 Encounters:   12/18/24 59 kg (130 lb)   Body mass index is 17.63 kg/m².    Currently NPO due to swallowing difficulty, pending ST evaluation

## 2024-12-19 NOTE — PROGRESS NOTES
ST swallowing evaluation orders received upon admission. Pt with extensive esophageal dysphagia and GI hx, including achalasia dx 2022 s/p myotomy.  ST spoke with attending and GI PA---Pt to remain NPO with plans for EGD with GI today.  ST will f/u post-procedure if clinically indicated.

## 2024-12-19 NOTE — PLAN OF CARE
Problem: Skin Injury Risk Increased  Goal: Skin Health and Integrity  Outcome: Progressing     Problem: Adult Inpatient Plan of Care  Goal: Plan of Care Review  Outcome: Progressing  Goal: Patient-Specific Goal (Individualized)  Outcome: Progressing  Goal: Absence of Hospital-Acquired Illness or Injury  Outcome: Progressing  Goal: Optimal Comfort and Wellbeing  Outcome: Progressing  Goal: Readiness for Transition of Care  Outcome: Progressing     Problem: Infection  Goal: Absence of Infection Signs and Symptoms  Outcome: Progressing     Problem: Acute Kidney Injury/Impairment  Goal: Fluid and Electrolyte Balance  Outcome: Progressing  Goal: Improved Oral Intake  Outcome: Progressing  Goal: Effective Renal Function  Outcome: Progressing

## 2024-12-19 NOTE — PLAN OF CARE
Dr. Humphreys at  to discuss findings. VSS. No  Pain, no GI bleeding. Pt to be discharged from unit.

## 2024-12-19 NOTE — BRIEF OP NOTE
Inpatient Endoscopy Brief Operative Note      Admit Date: 12/18/2024    Procedure Date and Time:  12/19/2024 1:00 PM    Attending Physician: Dariela Amezcua MD     Principal Admitting Diagnoses: MALLY (acute kidney injury)         Discharge Diagnosis: The primary encounter diagnosis was Achalasia, esophageal. Diagnoses of Shortness of breath and Chest pain were also pertinent to this visit.     Discharged Condition: Stable    Indication for Admission: MALLY (acute kidney injury)     Procedure Performed: EGD with Botox injection    SEE PROVATIONS REPORTS FOR DETAILS.    Pathology (if any):  Specimen (24h ago, onward)      None            Estimated Blood Loss: 1 ml.    Discussed with: patient.    Disposition: Return to hospital [facility].    Recommendations:   Full liquids  Aspiration precautions  Repeat EGD with Botox in 6 weeks  Patient to arrange GI follow up with Dr. Lomeli GI at [facility]. Discussed with patient.      Communicated with hospital medicine service regarding the above findings.       Malorie Humphreys MD  Inpatient Gastroenterology  [facility]

## 2024-12-19 NOTE — ASSESSMENT & PLAN NOTE
Consult ST for evaluation  May need gastroenterology consultation and possible EGD  NPO  Hydrating with IV fluids

## 2024-12-19 NOTE — PLAN OF CARE
[facility]  Discharge Assessment    Primary Care Provider: Malorie Madera DO     Discharge Assessment (most recent)       BRIEF DISCHARGE ASSESSMENT - 12/19/24 8246          Discharge Planning    Assessment Type Discharge Planning Brief Assessment     Resource/Environmental Concerns none     Support Systems Children;Friends/neighbors     Equipment Currently Used at Home none     Current Living Arrangements home     Patient/Family Anticipates Transition to home     Patient/Family Anticipated Services at Transition none     DME Needed Upon Discharge  none     Discharge Plan A Home

## 2024-12-19 NOTE — SUBJECTIVE & OBJECTIVE
Past Medical History:   Diagnosis Date    Achalasia 11/07/2022    GERD (gastroesophageal reflux disease)     Hypertension     Kidney stones     Left renal mass     Lung nodule     Mixed hyperlipidemia     Weight loss        Past Surgical History:   Procedure Laterality Date    COLONOSCOPY N/A 10/26/2022    Procedure: COLONOSCOPY;  Surgeon: Denver Ramos MD;  Location: [facility];  Service: Endoscopy;  Laterality: N/A;    ESOPHAGEAL MANOMETRY WITH MEASUREMENT OF IMPEDANCE N/A 11/07/2022    Procedure: MANOMETRY, ESOPHAGUS, WITH IMPEDANCE MEASUREMENT;  Surgeon: Delores Lomeli MD;  Location: [facility] (4TH FLR);  Service: Endoscopy;  Laterality: N/A;  instructions sent to [facility]    ESOPHAGOGASTRODUODENOSCOPY N/A 10/26/2022    Procedure: EGD (ESOPHAGOGASTRODUODENOSCOPY);  Surgeon: Denver Ramos MD;  Location: [facility];  Service: Endoscopy;  Laterality: N/A;  Medically Urgent  10/17 fully vaccinated; instructions to [facility]    ESOPHAGOGASTRODUODENOSCOPY N/A 11/30/2022    Procedure: EGD (ESOPHAGOGASTRODUODENOSCOPY) with musocal clip application;  Surgeon: Dianne Aguirre MD;  Location: [facility] OR 2ND FLR;  Service: General;  Laterality: N/A;    MYOTOMY, PERORAL, ENDOSCOPIC N/A 11/29/2022    Procedure: MYOTOMY, PERORAL, ENDOSCOPIC POEM;  Surgeon: Dianne Aguirre MD;  [facility];  Service: General;  Laterality: N/A;       Review of patient's allergies indicates:  No Known Allergies    No current facility-administered medications on file prior to encounter.     Current Outpatient Medications on File Prior to Encounter   Medication Sig    ascorbic acid, vitamin C, (VITAMIN C) 250 MG tablet Take 250 mg by mouth once daily.    atorvastatin (LIPITOR) 40 MG tablet Take 1 tablet (40 mg total) by mouth once daily.    b complex vitamins tablet Take 1 tablet by mouth once daily.    cyproheptadine (PERIACTIN) 4 mg tablet Take 4 mg by mouth 3 (three) times daily. Pt needs refill    erythromycin  (ROMYCIN) ophthalmic ointment APPLY A 3 MM RIBBON TO THE AFFECTED EYE AT NIGHT FOR 7 DAYS    hydroCHLOROthiazide (MICROZIDE) 12.5 mg capsule Take 1 capsule (12.5 mg total) by mouth once daily.    Lactobacillus combination no.8 (ADULT PROBIOTIC ORAL) Take by mouth.    ofloxacin (OCUFLOX) 0.3 % ophthalmic solution INSTILL 1 DROP INTO THE RIGHT EYE FOUR TIMES DAILY FOR 7 DAYS    omeprazole (PRILOSEC) 40 MG capsule Take 1 capsule (40 mg total) by mouth every morning.    vitamin D (VITAMIN D3) 1000 units Tab Take 1,000 Units by mouth once daily.     Family History       Problem Relation (Age of Onset)    Diabetes Father, Sister    Heart failure Father          Tobacco Use    Smoking status: Former     Current packs/day: 0.00     Types: Cigars, Cigarettes     Start date:      Quit date: 2022     Years since quittin.3    Smokeless tobacco: Never    Tobacco comments:     Currently uses a non-nicotine vape pen   Substance and Sexual Activity    Alcohol use: Yes     Alcohol/week: 1.0 standard drink of alcohol     Types: 1 Cans of beer per week    Drug use: Not Currently     Types: Marijuana    Sexual activity: Not Currently     Review of Systems   Constitutional:  Negative for chills, diaphoresis and fever.   HENT:  Positive for trouble swallowing.    Eyes: Negative.    Respiratory: Negative.  Negative for cough, chest tightness, shortness of breath and wheezing.    Cardiovascular: Negative.  Negative for chest pain, palpitations and leg swelling.   Gastrointestinal:  Positive for vomiting. Negative for abdominal distention and abdominal pain.   Endocrine: Negative.    Genitourinary: Negative.    Musculoskeletal:  Positive for gait problem.        Weakness, falls   Allergic/Immunologic: Negative.    Neurological:  Positive for weakness. Negative for dizziness, seizures, syncope, facial asymmetry, speech difficulty, light-headedness and headaches.        Falls   Psychiatric/Behavioral: Negative.       Objective:      Vital Signs (Most Recent):  Temp: 97.7 °F (36.5 °C) (12/18/24 1642)  Pulse: 82 (12/18/24 1932)  Resp: 16 (12/18/24 1932)  BP: 118/75 (12/18/24 1932)  SpO2: 100 % (12/18/24 1932) Vital Signs (24h Range):  Temp:  [97.7 °F (36.5 °C)] 97.7 °F (36.5 °C)  Pulse:  [71-90] 82  Resp:  [14-20] 16  SpO2:  [99 %-100 %] 100 %  BP: (105-118)/(57-75) 118/75     Weight: 59 kg (130 lb)  Body mass index is 17.63 kg/m².     Physical Exam  Constitutional:       Appearance: He is ill-appearing. He is not toxic-appearing or diaphoretic.      Comments: Thin, frail, emaciated gentleman, no distress   HENT:      Head: Normocephalic.      Nose: Nose normal.      Mouth/Throat:      Mouth: Mucous membranes are dry.   Eyes:      Pupils: Pupils are equal, round, and reactive to light.   Cardiovascular:      Rate and Rhythm: Normal rate and regular rhythm.      Pulses: Normal pulses.      Heart sounds: Normal heart sounds.   Pulmonary:      Effort: Pulmonary effort is normal. No respiratory distress.      Breath sounds: Normal breath sounds. No stridor. No wheezing, rhonchi or rales.   Chest:      Chest wall: No tenderness.   Abdominal:      General: Bowel sounds are normal. There is no distension.      Palpations: Abdomen is soft.      Tenderness: There is no abdominal tenderness. There is no guarding.   Musculoskeletal:         General: Normal range of motion.      Cervical back: Neck supple.      Right lower leg: No edema.      Left lower leg: No edema.   Skin:     General: Skin is warm and dry.      Capillary Refill: Capillary refill takes less than 2 seconds.   Neurological:      General: No focal deficit present.      Mental Status: He is alert and oriented to person, place, and time.      Motor: Weakness present.      Comments: Generalized weakness   Psychiatric:         Mood and Affect: Mood normal.         Behavior: Behavior normal.              CRANIAL NERVES     CN III, IV, VI   Pupils are equal, round, and reactive to light.        Significant Labs: All pertinent labs within the past 24 hours have been reviewed.    CBC:   Recent Labs   Lab 12/18/24  1834   WBC 9.36   HGB 16.4   HCT 49.1        CMP:   Recent Labs   Lab 12/18/24  1834   *   K 4.4   CL 98   CO2 18*   GLU 95   BUN 61*   CREATININE 1.8*   CALCIUM 10.5   PROT 8.6*   ALBUMIN 4.1   BILITOT 0.9   ALKPHOS 57   AST 25   ALT 26   ANIONGAP 19*     Troponin:   Recent Labs   Lab 12/18/24  1834   TROPONINI 0.006     BNP 28    Significant Imaging: I have reviewed all pertinent imaging results/findings within the past 24 hours.    CXR -- lungs appear clear, per my interpretation

## 2024-12-19 NOTE — PLAN OF CARE
Discussed poc with pt, pt verbalized understanding    Pt had EGD this shift    VS wnl  Cardiac monitoring in use, pt is NSR to SB  Fall precautions in place, remains injury free  Pt denies c/o pain and nausea    IVFs  Accurate I&Os. Full liquid diet ordered. Pt tolerating  Bed locked at lowest position  Call light within reach    Chart check complete  Will cont with POC

## 2024-12-19 NOTE — SUBJECTIVE & OBJECTIVE
Past Medical History:   Diagnosis Date    Achalasia 2022    GERD (gastroesophageal reflux disease)     Hypertension     Kidney stones     Left renal mass     Lung nodule     Mixed hyperlipidemia     Weight loss        Past Surgical History:   Procedure Laterality Date    COLONOSCOPY N/A 10/26/2022    Procedure: COLONOSCOPY;  Surgeon: Denver Ramos MD;  Location: [facility];  Service: Endoscopy;  Laterality: N/A;    ESOPHAGEAL MANOMETRY WITH MEASUREMENT OF IMPEDANCE N/A 2022    Procedure: MANOMETRY, ESOPHAGUS, WITH IMPEDANCE MEASUREMENT;  Surgeon: Delores Lomeli MD;  Location: [facility] (4TH FLR);  Service: Endoscopy;  Laterality: N/A;  instructions sent to [facility]    ESOPHAGOGASTRODUODENOSCOPY N/A 10/26/2022    Procedure: EGD (ESOPHAGOGASTRODUODENOSCOPY);  Surgeon: Denver Ramos MD;  Location: [facility];  Service: Endoscopy;  Laterality: N/A;  Medically Urgent  10/17 fully vaccinated; instructions to [place]-    ESOPHAGOGASTRODUODENOSCOPY N/A 2022    Procedure: EGD (ESOPHAGOGASTRODUODENOSCOPY) with musocal clip application;  Surgeon: Dianne Aguirre MD;  Location: [facility] OR 2ND FLR;  Service: General;  Laterality: N/A;    MYOTOMY, PERORAL, ENDOSCOPIC N/A 2022    Procedure: MYOTOMY, PERORAL, ENDOSCOPIC POEM;  Surgeon: Dianne Aguirre MD;  Location: [facility] OR 2ND FLR;  Service: General;  Laterality: N/A;       Review of patient's allergies indicates:  No Known Allergies  Family History       Problem Relation (Age of Onset)    Diabetes Father, Sister    Heart failure Father          Tobacco Use    Smoking status: Former     Current packs/day: 0.00     Types: Cigars, Cigarettes     Start date:      Quit date: 2022     Years since quittin.3    Smokeless tobacco: Never    Tobacco comments:     Currently uses a non-nicotine vape pen   Substance and Sexual Activity    Alcohol use: Yes     Alcohol/week: 1.0 standard drink of alcohol     Types: 1 Cans of beer per  week    Drug use: Not Currently     Types: Marijuana    Sexual activity: Not Currently     Review of Systems   Constitutional:  Negative for fever.   HENT:  Negative for hearing loss.    Eyes:  Negative for visual disturbance.   Respiratory:  Negative for cough and shortness of breath.    Cardiovascular:  Negative for chest pain and palpitations.   Gastrointestinal:         As per HPI.   Genitourinary:  Negative for difficulty urinating, dysuria, frequency and hematuria.   Musculoskeletal:  Negative for arthralgias and back pain.   Skin:  Negative for color change.   Neurological:  Positive for weakness. Negative for seizures, syncope and headaches.   Hematological:  Does not bruise/bleed easily.     Objective:     Vital Signs (Most Recent):  Temp: 97.7 °F (36.5 °C) (12/19/24 0428)  Pulse: (!) 58 (12/19/24 0739)  Resp: 20 (12/19/24 0428)  BP: (!) 110/53 (12/19/24 0500)  SpO2: 99 % (12/19/24 0428) Vital Signs (24h Range):  Temp:  [97.6 °F (36.4 °C)-98 °F (36.7 °C)] 97.7 °F (36.5 °C)  Pulse:  [58-90] 58  Resp:  [14-20] 20  SpO2:  [98 %-100 %] 99 %  BP: ()/(52-75) 110/53     Weight: 59 kg (130 lb) (12/18/24 1642)  Body mass index is 17.63 kg/m².      Intake/Output Summary (Last 24 hours) at 12/19/2024 0920  Last data filed at 12/19/2024 0740  Gross per 24 hour   Intake 0 ml   Output 500 ml   Net -500 ml       Lines/Drains/Airways       Peripheral Intravenous Line  Duration                  Peripheral IV - Single Lumen 12/18/24 2044 20 G Left Antecubital <1 day                     Physical Exam  Constitutional:       General: He is not in acute distress.     Appearance: He is cachectic.   HENT:      Head: Normocephalic and atraumatic.   Eyes:      Extraocular Movements: Extraocular movements intact.   Cardiovascular:      Rate and Rhythm: Normal rate and regular rhythm.      Heart sounds: Normal heart sounds. No murmur heard.  Pulmonary:      Effort: Pulmonary effort is normal. No respiratory distress.      Breath  "sounds: Normal breath sounds. No wheezing.   Abdominal:      General: Bowel sounds are normal. There is no distension.      Palpations: Abdomen is soft. There is no mass.      Tenderness: There is no abdominal tenderness.   Musculoskeletal:      Cervical back: Normal range of motion and neck supple.      Right lower leg: No edema.      Left lower leg: No edema.   Skin:     General: Skin is warm and dry.      Findings: No rash.   Neurological:      Mental Status: He is alert and oriented to person, place, and time.      Cranial Nerves: No cranial nerve deficit.   Psychiatric:         Behavior: Behavior normal.          Significant Labs:  CBC:   Recent Labs   Lab 12/18/24  1834 12/19/24  0651   WBC 9.36 8.06   HGB 16.4 13.3*   HCT 49.1 39.1*    268     CMP:   Recent Labs   Lab 12/19/24  0651   *   CALCIUM 8.7   ALBUMIN 3.2*   PROT 6.4   *   K 3.5   CO2 23      BUN 46*   CREATININE 1.2   ALKPHOS 43   ALT 19   AST 20   BILITOT 0.9     Coagulation: No results for input(s): "PT", "INR", "APTT" in the last 48 hours.    Significant Imaging:  Imaging results within the past 24 hours have been reviewed.  "

## 2024-12-19 NOTE — H&P
"  [facility]  History & Physical    Patient Name: Jose Young  MRN: [id number]  Patient Class: OP- Observation  Admission Date: 12/18/2024  Attending Physician: Louann Gutierrez MD   Primary Care Provider: Malorie Madera DO         Patient information was obtained from patient, relative(s), past medical records, and ER records.     Subjective:     Principal Problem:MALLY (acute kidney injury)    Chief Complaint:   Chief Complaint   Patient presents with    Weakness     Pt reports weakness with SEGOVIA for a couple of days. No reported pain currently.        HPI: Patient is a 74 year old male with past medical history of hypertension, hyperlipidemia, GERD, kidney stones, left renal mass, lung nodule, and achalasia s/p myotomy 11/2022 due to dysphagia (and weight loss of 50 lbs) who presented to ED for evaluation of weakness. Family reports worsening weakness, falls, and difficulty swallowing for past several months. Per family (ex-wife) at bedside, he is not eating or drinking, he says everything "just comes back up." He had been able to tolerate solids, but does say he has no appetite and when he tries to drink liquids including Ensure, he has to spit it out, says he cannot swallow it. He has developed generalized weakness and has had some falls. He denies pain, shortness of breath, chest pain, abdominal pain, fever, chills, diarrhea.   Vital signs are stable while in ED, temp 97.7°, blood pressure 118/75, 100% SpO2 on room air.  Heart rate 82.  Lab workup shows dehydration with CO2 18, BUN 61, creatinine 1.8.  BNP 28, troponin 0.006, normal LFTs.  Chest x-ray demonstrates a lungs are clear.  While in ED, patient was given liquids to drink, was unable to tolerate and vomited right afterwards. Hospital Medicine was consulted for admission due to MALLY and dysphagia.    Past Medical History:   Diagnosis Date    Achalasia 11/07/2022    GERD (gastroesophageal reflux disease)     Hypertension     " Kidney stones     Left renal mass     Lung nodule     Mixed hyperlipidemia     Weight loss        Past Surgical History:   Procedure Laterality Date    COLONOSCOPY N/A 10/26/2022    Procedure: COLONOSCOPY;  Surgeon: Denver Ramos MD;  Location: [facility];  Service: Endoscopy;  Laterality: N/A;    ESOPHAGEAL MANOMETRY WITH MEASUREMENT OF IMPEDANCE N/A 11/07/2022    Procedure: MANOMETRY, ESOPHAGUS, WITH IMPEDANCE MEASUREMENT;  Surgeon: Delores Lomeli MD;  Location: [facility] (4TH FLR);  Service: Endoscopy;  Laterality: N/A;  instructions sent to [facility]    ESOPHAGOGASTRODUODENOSCOPY N/A 10/26/2022    Procedure: EGD (ESOPHAGOGASTRODUODENOSCOPY);  Surgeon: Denver Ramos MD;  Location: [facility];  Service: Endoscopy;  Laterality: N/A;  Medically Urgent  10/17 fully vaccinated; instructions to [place]-    ESOPHAGOGASTRODUODENOSCOPY N/A 11/30/2022    Procedure: EGD (ESOPHAGOGASTRODUODENOSCOPY) with musocal clip application;  Surgeon: Dianne Aguirre MD;  Location: [facility] OR 2ND FLR;  Service: General;  Laterality: N/A;    MYOTOMY, PERORAL, ENDOSCOPIC N/A 11/29/2022    Procedure: MYOTOMY, PERORAL, ENDOSCOPIC POEM;  Surgeon: Dianne Aguirre MD;  [facility];  Service: General;  Laterality: N/A;       Review of patient's allergies indicates:  No Known Allergies    No current facility-administered medications on file prior to encounter.     Current Outpatient Medications on File Prior to Encounter   Medication Sig    ascorbic acid, vitamin C, (VITAMIN C) 250 MG tablet Take 250 mg by mouth once daily.    atorvastatin (LIPITOR) 40 MG tablet Take 1 tablet (40 mg total) by mouth once daily.    b complex vitamins tablet Take 1 tablet by mouth once daily.    cyproheptadine (PERIACTIN) 4 mg tablet Take 4 mg by mouth 3 (three) times daily. Pt needs refill    erythromycin (ROMYCIN) ophthalmic ointment APPLY A 3 MM RIBBON TO THE AFFECTED EYE AT NIGHT FOR 7 DAYS    hydroCHLOROthiazide (MICROZIDE)  12.5 mg capsule Take 1 capsule (12.5 mg total) by mouth once daily.    Lactobacillus combination no.8 (ADULT PROBIOTIC ORAL) Take by mouth.    ofloxacin (OCUFLOX) 0.3 % ophthalmic solution INSTILL 1 DROP INTO THE RIGHT EYE FOUR TIMES DAILY FOR 7 DAYS    omeprazole (PRILOSEC) 40 MG capsule Take 1 capsule (40 mg total) by mouth every morning.    vitamin D (VITAMIN D3) 1000 units Tab Take 1,000 Units by mouth once daily.     Family History       Problem Relation (Age of Onset)    Diabetes Father, Sister    Heart failure Father          Tobacco Use    Smoking status: Former     Current packs/day: 0.00     Types: Cigars, Cigarettes     Start date:      Quit date: 2022     Years since quittin.3    Smokeless tobacco: Never    Tobacco comments:     Currently uses a non-nicotine vape pen   Substance and Sexual Activity    Alcohol use: Yes     Alcohol/week: 1.0 standard drink of alcohol     Types: 1 Cans of beer per week    Drug use: Not Currently     Types: Marijuana    Sexual activity: Not Currently     Review of Systems   Constitutional:  Negative for chills, diaphoresis and fever.   HENT:  Positive for trouble swallowing.    Eyes: Negative.    Respiratory: Negative.  Negative for cough, chest tightness, shortness of breath and wheezing.    Cardiovascular: Negative.  Negative for chest pain, palpitations and leg swelling.   Gastrointestinal:  Positive for vomiting. Negative for abdominal distention and abdominal pain.   Endocrine: Negative.    Genitourinary: Negative.    Musculoskeletal:  Positive for gait problem.        Weakness, falls   Allergic/Immunologic: Negative.    Neurological:  Positive for weakness. Negative for dizziness, seizures, syncope, facial asymmetry, speech difficulty, light-headedness and headaches.        Falls   Psychiatric/Behavioral: Negative.       Objective:     Vital Signs (Most Recent):  Temp: 97.7 °F (36.5 °C) (24 1642)  Pulse: 82 (24 1932)  Resp: 16 (24  1932)  BP: 118/75 (12/18/24 1932)  SpO2: 100 % (12/18/24 1932) Vital Signs (24h Range):  Temp:  [97.7 °F (36.5 °C)] 97.7 °F (36.5 °C)  Pulse:  [71-90] 82  Resp:  [14-20] 16  SpO2:  [99 %-100 %] 100 %  BP: (105-118)/(57-75) 118/75     Weight: 59 kg (130 lb)  Body mass index is 17.63 kg/m².     Physical Exam  Constitutional:       Appearance: He is ill-appearing. He is not toxic-appearing or diaphoretic.      Comments: Thin, frail, emaciated gentleman, no distress   HENT:      Head: Normocephalic.      Nose: Nose normal.      Mouth/Throat:      Mouth: Mucous membranes are dry.   Eyes:      Pupils: Pupils are equal, round, and reactive to light.   Cardiovascular:      Rate and Rhythm: Normal rate and regular rhythm.      Pulses: Normal pulses.      Heart sounds: Normal heart sounds.   Pulmonary:      Effort: Pulmonary effort is normal. No respiratory distress.      Breath sounds: Normal breath sounds. No stridor. No wheezing, rhonchi or rales.   Chest:      Chest wall: No tenderness.   Abdominal:      General: Bowel sounds are normal. There is no distension.      Palpations: Abdomen is soft.      Tenderness: There is no abdominal tenderness. There is no guarding.   Musculoskeletal:         General: Normal range of motion.      Cervical back: Neck supple.      Right lower leg: No edema.      Left lower leg: No edema.   Skin:     General: Skin is warm and dry.      Capillary Refill: Capillary refill takes less than 2 seconds.   Neurological:      General: No focal deficit present.      Mental Status: He is alert and oriented to person, place, and time.      Motor: Weakness present.      Comments: Generalized weakness   Psychiatric:         Mood and Affect: Mood normal.         Behavior: Behavior normal.              CRANIAL NERVES     CN III, IV, VI   Pupils are equal, round, and reactive to light.       Significant Labs: All pertinent labs within the past 24 hours have been reviewed.    CBC:   Recent Labs   Lab  12/18/24  1834   WBC 9.36   HGB 16.4   HCT 49.1        CMP:   Recent Labs   Lab 12/18/24 1834   *   K 4.4   CL 98   CO2 18*   GLU 95   BUN 61*   CREATININE 1.8*   CALCIUM 10.5   PROT 8.6*   ALBUMIN 4.1   BILITOT 0.9   ALKPHOS 57   AST 25   ALT 26   ANIONGAP 19*     Troponin:   Recent Labs   Lab 12/18/24 1834   TROPONINI 0.006     BNP 28    Significant Imaging: I have reviewed all pertinent imaging results/findings within the past 24 hours.    CXR -- lungs appear clear, per my interpretation    Assessment/Plan:     * MALLY (acute kidney injury)  MALLY is likely due to pre-renal azotemia due to dehydration. Baseline creatinine is  0.8-1.0 . Most recent creatinine and eGFR are listed below.  Recent Labs     12/18/24 1834   CREATININE 1.8*   EGFRNORACEVR 39*      Plan  - MALLY is secondary to poor oral intake in setting of dysphagia/vomiting and decreased appetite  - Hydrating with IV fluids  - Repeat labs in AM    Protein-calorie malnutrition  Nutrition consulted. Most recent weight and BMI monitored-     Measurements:  Wt Readings from Last 1 Encounters:   12/18/24 59 kg (130 lb)   Body mass index is 17.63 kg/m².    Currently NPO due to swallowing difficulty, pending ST evaluation     Weight loss  Family reports weight loss of greater than 50 lbs due to very poor appetite/oral intake  Increased trouble swallowing over past three months    Dysphagia  Consult ST for evaluation  May need gastroenterology consultation and possible EGD  NPO  Hydrating with IV fluids      Achalasia, esophageal  Had myotomy November 2022        Hypertension, essential  Patient's blood pressure range in the last 24 hours was: BP  Min: 105/74  Max: 118/75.The patient's inpatient anti-hypertensive regimen is listed below:  Current Antihypertensives   No longer on medications     Plan  - BP is controlled, no changes needed to their regimen      Hyperlipidemia, mixed  Hold statin for now, while NPO        VTE Risk Mitigation (From  admission, onward)           Ordered     IP VTE HIGH RISK PATIENT  Once         12/18/24 2009     Place sequential compression device  Until discontinued         12/18/24 2009     Reason for No Pharmacological VTE Prophylaxis  Once        Comments: Procedure may be warranted   Question:  Reasons:  Answer:  Physician Provided (leave comment)    12/18/24 2009                   This patient's case has been reviewed by my supervising physician, Dr. Louann Gutierrez.  Supervising physician will provide additional orders and recommendations at his or her discretion.    On 12/18/2024, patient should be placed in hospital observation services under my care in collaboration with Dr. Louann Gutierrez.           Colette Joseph NP  Department of Hospital Medicine  [facility] - Med Surg

## 2024-12-19 NOTE — ASSESSMENT & PLAN NOTE
Family reports weight loss of greater than 50 lbs due to very poor appetite/oral intake  Increased trouble swallowing over past three months

## 2024-12-19 NOTE — ASSESSMENT & PLAN NOTE
BMI low but albumin 4.     Measurements:  Wt Readings from Last 1 Encounters:   12/18/24 59 kg (130 lb)   Body mass index is 17.63 kg/m².

## 2024-12-19 NOTE — INTERVAL H&P NOTE
The patient has been examined and the H&P has been reviewed:    I concur with the findings and no changes have occurred since H&P was written.    Anesthesia/Surgery risks, benefits and alternative options discussed and understood by patient/family.      The risks, benefits and alternatives of the procedure were discussed with the patient in detail. This discussion was had in the presence of endoscopy staff. The risks include, risks of adverse reaction to sedation requiring the use of reversal agents, bleeding requiring blood transfusion, perforation requiring surgical intervention and technical failure. Other risks include aspiration leading to respiratory distress and respiratory failure resulting in endotracheal intubation and mechanical ventilation including death. If anesthesia is being utilized for this procedure, it is up to the anesthesiologist to determine airway safety including elective endotracheal intubation. Questions were answered, they agree to proceed. There was no language barriers.      Active Hospital Problems    Diagnosis  POA    *MALLY (acute kidney injury) [N17.9]  Yes    Dysphagia [R13.10]  Yes    Weight loss [R63.4]  Yes    Protein-calorie malnutrition [E46]  Yes    Achalasia, esophageal [K22.0]  Yes    Hypertension, essential [I10]  Yes    Hyperlipidemia, mixed [E78.2]  Yes      Resolved Hospital Problems   No resolved problems to display.

## 2024-12-19 NOTE — PROVATION PATIENT INSTRUCTIONS
Discharge Summary/Instructions after an Endoscopic Procedure  Patient Name: Jose Young  Patient MRN: [id number]  Patient YOB: 1950 Thursday, December 19, 2024 Malorie Humphreys MD  Dear patient,  As a result of recent federal legislation (The Federal Cures Act), you may   receive lab or pathology results from your procedure in your [facility]   account before your physician is able to contact you. Your physician or   their representative will relay the results to you with their   recommendations at their soonest availability.  Thank you,  RESTRICTIONS:  During your procedure today, you received medications for sedation.  These   medications may affect your judgment, balance and coordination.  Therefore,   for 24 hours, you have the following restrictions:   - DO NOT drive a car, operate machinery, make legal/financial decisions,   sign important papers or drink alcohol.    ACTIVITY:  Today: no heavy lifting, straining or running due to procedural   sedation/anesthesia.  The following day: return to full activity including work.  DIET:  Eat and drink normally unless instructed otherwise.     TREATMENT FOR COMMON SIDE EFFECTS:  - Mild abdominal pain, nausea, belching, bloating or excessive gas:  rest,   eat lightly and use a heating pad.  - Sore Throat: treat with throat lozenges and/or gargle with warm salt   water.  - Because air was used during the procedure, expelling large amounts of air   from your rectum or belching is normal.  - If a bowel prep was taken, you may not have a bowel movement for 1-3 days.    This is normal.  SYMPTOMS TO WATCH FOR AND REPORT TO YOUR PHYSICIAN:  1. Abdominal pain or bloating, other than gas cramps.  2. Chest pain.  3. Back pain.  4. Signs of infection such as: chills or fever occurring within 24 hours   after the procedure.  5. Rectal bleeding, which would show as bright red, maroon, or black stools.   (A tablespoon of blood from the rectum is not serious, especially  if   hemorrhoids are present.)  6. Vomiting.  7. Weakness or dizziness.  GO DIRECTLY TO THE NEAREST EMERGENCY ROOM IF YOU HAVE ANY OF THE FOLLOWING:      Difficulty breathing              Chills and/or fever over 101 F   Persistent vomiting and/or vomiting blood   Severe abdominal pain   Severe chest pain   Black, tarry stools   Bleeding- more than one tablespoon   Any other symptom or condition that you feel may need urgent attention  Your doctor recommends these additional instructions:  If any biopsies were taken, your doctors clinic will contact you in 1 to 2   weeks with any results.  - [facility].   - Full liquid diet.   - Aspiration precautions  - Observe patient's clinical course.   - Repeat upper endoscopy in 6 weeks for retreatment.   - Return to GI clinic with Dr. Delores Lomeli of Gastroenterology.  For questions, problems or results please call your physician Malorie Humphreys MD at Work:  [phone]  If you have any questions about the above instructions, call the GI   department at [phone] or call the endoscopy unit at [phone]   from 7am until 3 pm.  [facility], EMERGENCY ROOM PHONE NUMBER:   [phone]  IF A COMPLICATION OR EMERGENCY SITUATION ARISES AND YOU ARE UNABLE TO REACH   YOUR PHYSICIAN - GO DIRECTLY TO THE EMERGENCY ROOM.  I have read or have had read to me these discharge instructions for my   procedure and have received a written copy.  I understand these   instructions and will follow-up with my physician if I have any questions.     __________________________________       _____________________________________  Nurse Signature                                          Patient/Designated   Responsible Party Signature  MD Malorie Bay MD  12/19/2024 1:00:25 PM  This report has been verified and signed electronically.  Dear patient,  As a result of recent federal legislation (The Federal  Cures Act), you may   receive lab or pathology results from your procedure in your [facility]   account before your physician is able to contact you. Your physician or   their representative will relay the results to you with their   recommendations at their soonest availability.  Thank you,  PROVATION

## 2024-12-19 NOTE — ASSESSMENT & PLAN NOTE
Dx 2022 followed by Dr. Lomeli  S/p POEM by Dr. Gates.  Now with tolerance of solids but not liquids causing dehydration.   Albumin 4 and reportedly some recent weight gain.   Will reach out to Dr. Lomeli in [place] but will likely get an esophagram while here. Will need to arrange follow up [place] with specialists.

## 2024-12-19 NOTE — PT/OT/SLP EVAL
Physical Therapy Evaluation    Patient Name:  Jose Young   MRN:  [id number]    Recommendations:     Discharge Recommendations: Low Intensity Therapy   Discharge Equipment Recommendations: walker, rolling   Barriers to discharge: None    Assessment:     Jose Young is a 74 y.o. male admitted with a medical diagnosis of MALLY (acute kidney injury).  He presents with the following impairments/functional limitations: weakness, impaired endurance, impaired self care skills, gait instability, impaired balance, decreased ROM, impaired functional mobility, decreased lower extremity function .    Rehab Prognosis: Good; patient would benefit from acute skilled PT services to address these deficits and reach maximum level of function.    Recent Surgery: Procedure(s) (LRB):  EGD w/ botox (N/A) Day of Surgery    Plan:     During this hospitalization, patient to be seen 3 x/week to address the identified rehab impairments via gait training, therapeutic activities, therapeutic exercises and progress toward the following goals:    Plan of Care Expires:  01/02/25    Subjective     Chief Complaint: WEAKNESS   Patient/Family Comments/goals: INC STRENGTH   Pain/Comfort:  Pain Rating 1: 0/10  Pain Rating Post-Intervention 1: 0/10    Patients cultural, spiritual, [employer] conflicts given the current situation:      Living Environment:   PT LIVES AT HOME ALONE IN A ONE STORY HOME WITH NO STEPS TO ENTER HOME   Prior to admission, 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  Equipment used at home: none.  DME owned (not currently used): none.  Upon discharge, patient will have assistance from UNKNOWN .    Objective:     Communicated with NURSE RAÚL AND EPIC CHART REVIEW  prior to session.  Patient found supine with peripheral IV, telemetry  upon PT entry to room.    General Precautions: Standard, NPO, fall  Orthopedic Precautions:N/A   Braces: N/A  Respiratory Status: Room  "air    Exams:  Cognitive Exam:  Patient is oriented to Person, Place, Time, and Situation  RLE ROM: WFL  RLE Strength: 4/5  LLE ROM: WFL  LLE Strength: 4/5    Functional Mobility:  Bed Mobility:     Rolling Left:  independence  Scooting: independence  Supine to Sit: independence  Transfers:     Sit to Stand:  contact guard assistance with rolling walker  Bed to Chair: contact guard assistance with  rolling walker  using  Stand Pivot  Gait: PT GT TRAINED X 110' WITH CGA >SBA WITH RW.       AM-PAC 6 CLICK MOBILITY  Total Score:20       Treatment & Education:  GT. BELT AND  SOCKS DONNED PRIOR TO OOB MOBILITY.  PT GT TRAINED AND RETURNED TO RM T./F TO CHAIR WITH RW AND CGA>SBA. PT EDUCATED ON SAFETY WITH RW.   PT EDUCATED ON ROLE OF P.T. AND ON REC FOR CONT P.T. AND USE OF RW @ D/C  PT EDUCATED ON "CALL DON'T FALL", ENCOURAGED TO CALL FOR ASSISTANCE WITH ALL NEEDS FOR OOB MOBILITY.      Patient left up in chair with all lines intact, call button in reach, and chair alarm on.    GOALS:   Multidisciplinary Problems       Physical Therapy Goals          Problem: Physical Therapy    Goal Priority Disciplines Outcome Interventions   Physical Therapy Goal     PT, PT/OT     Description: LT25  1. PT WILL COMPLETE BED MOBILITY IND  2. PT WILL STAND T/F TO CHAIR WITH RW MOD I  3. PT WILL GT TRAIN X 250' WITH RW MOD I TO PROGRESS   4. PT WILL INC AMPAC SCORE BY 2 POINTS TO PROGRESS GROSS FUNC MOBILITY.                          History:     Past Medical History:   Diagnosis Date    Achalasia 2022    GERD (gastroesophageal reflux disease)     Hypertension     Kidney stones     Left renal mass     Lung nodule     Mixed hyperlipidemia     Weight loss        Past Surgical History:   Procedure Laterality Date    COLONOSCOPY N/A 10/26/2022    Procedure: COLONOSCOPY;  Surgeon: Denver Ramos MD;  Location: [facility];  Service: Endoscopy;  Laterality: N/A;    ESOPHAGEAL MANOMETRY WITH MEASUREMENT OF IMPEDANCE N/A 2022 "    Procedure: MANOMETRY, ESOPHAGUS, WITH IMPEDANCE MEASUREMENT;  Surgeon: Delores Lomeli MD;  Location: [facility] (4[facility] FLR);  Service: Endoscopy;  Laterality: N/A;  instructions sent to [facility]    ESOPHAGOGASTRODUODENOSCOPY N/A 10/26/2022    Procedure: EGD (ESOPHAGOGASTRODUODENOSCOPY);  Surgeon: Denver Ramos MD;  Location: [facility] ENDO;  Service: Endoscopy;  Laterality: N/A;  Medically Urgent  10/17 fully vaccinated; instructions to [facility]    ESOPHAGOGASTRODUODENOSCOPY N/A 11/30/2022    Procedure: EGD (ESOPHAGOGASTRODUODENOSCOPY) with musocal clip application;  Surgeon: Dianne Aguirre MD;  Location: [facility] OR [address] [place] [address];  Service: General;  Laterality: N/A;    MYOTOMY, PERORAL, ENDOSCOPIC N/A 11/29/2022    Procedure: MYOTOMY, PERORAL, ENDOSCOPIC POEM;  Surgeon: Dianne Aguirre MD;  Location: [facility] OR [address] [place] [address];  Service: General;  Laterality: N/A;       Time Tracking:     PT Received On: 12/19/24  PT Start Time: 0950     PT Stop Time: 1015  PT Total Time (min): 25 min     Billable Minutes: Evaluation 15 and Gait Training 10      12/19/2024

## 2024-12-19 NOTE — HPI
The patient has a history of Type II Achalasia diagnosed in 2022 and followed by Dr. Lomeli in [place]. S/p POEM on 11/29/22 by Dr. Gates. The patient presented to the ER for weakness. He reports dysphagia to liquids with gradual worsening over the last few months. He is unable to take liquids without vomiting. He can keep solids down but they take time to get down. He denies hematemesis, hematochezia, melena or change in bowel habits. He says a home nurse recently told him he gained 4 pounds. In the ER, CBC was unremarkable. BUN 61, creatinine 1.8, sodium 135, potassium 4.4, albumin 4.1. after hydration, BUN 46 and creatinine 1.2.

## 2024-12-19 NOTE — CONSULTS
[facility]  Gastroenterology  Consult Note    Patient Name: Jose Young  MRN: [id number]  Admission Date: 12/18/2024  Hospital Length of Stay: 0 days  Code Status: Full Code   Attending Provider: Dariela Amezcua MD   Consulting Provider: Yrn Leos PA-C  Primary Care Physician: Malorie Madera DO  Principal Problem:MALLY (acute kidney injury)    Inpatient consult to Gastroenterology  Consult performed by: Yrn Leos PA-C  Consult ordered by: Colette Joseph NP  Reason for consult: Dysphagia; dehydration      Subjective:     HPI:  The patient has a history of Type II Achalasia diagnosed in 2022 and followed by Dr. Lomeli in [place]. S/p POEM on 11/29/22 by Dr. Gates. The patient presented to the ER for weakness. He reports dysphagia to liquids with gradual worsening over the last few months. He is unable to take liquids without vomiting. He can keep solids down but they take time to get down. He denies hematemesis, hematochezia, melena or change in bowel habits. He says a home nurse recently told him he gained 4 pounds. In the ER, CBC was unremarkable. BUN 61, creatinine 1.8, sodium 135, potassium 4.4, albumin 4.1. after hydration, BUN 46 and creatinine 1.2.     Past Medical History:   Diagnosis Date    Achalasia 11/07/2022    GERD (gastroesophageal reflux disease)     Hypertension     Kidney stones     Left renal mass     Lung nodule     Mixed hyperlipidemia     Weight loss        Past Surgical History:   Procedure Laterality Date    COLONOSCOPY N/A 10/26/2022    Procedure: COLONOSCOPY;  Surgeon: Denver Ramos MD;  Location: [facility] ENDO;  Service: Endoscopy;  Laterality: N/A;    ESOPHAGEAL MANOMETRY WITH MEASUREMENT OF IMPEDANCE N/A 11/07/2022    Procedure: MANOMETRY, ESOPHAGUS, WITH IMPEDANCE MEASUREMENT;  Surgeon: Delores Lomeli MD;  Location: [facility] ([address] [place] [address]);  Service: Endoscopy;  Laterality: N/A;  instructions sent to [facility]    ESOPHAGOGASTRODUODENOSCOPY N/A  10/26/2022    Procedure: EGD (ESOPHAGOGASTRODUODENOSCOPY);  Surgeon: Denver Ramos MD;  Location: [facility];  Service: Endoscopy;  Laterality: N/A;  Medically Urgent  10/17 fully vaccinated; instructions to portal-st    ESOPHAGOGASTRODUODENOSCOPY N/A 2022    Procedure: EGD (ESOPHAGOGASTRODUODENOSCOPY) with musocal clip application;  Surgeon: Dianne Aguirre MD;  Location: [address];  Service: General;  Laterality: N/A;    MYOTOMY, PERORAL, ENDOSCOPIC N/A 2022    Procedure: MYOTOMY, PERORAL, ENDOSCOPIC POEM;  Surgeon: Dianne Aguirre MD;  Location: [facility] OR [address] [place] [address];  Service: General;  Laterality: N/A;       Review of patient's allergies indicates:  No Known Allergies  Family History       Problem Relation (Age of Onset)    Diabetes Father, Sister    Heart failure Father          Tobacco Use    Smoking status: Former     Current packs/day: 0.00     Types: Cigars, Cigarettes     Start date:      Quit date: 2022     Years since quittin.3    Smokeless tobacco: Never    Tobacco comments:     Currently uses a non-nicotine vape pen   Substance and Sexual Activity    Alcohol use: Yes     Alcohol/week: 1.0 standard drink of alcohol     Types: 1 Cans of beer per week    Drug use: Not Currently     Types: Marijuana    Sexual activity: Not Currently     Review of Systems   Constitutional:  Negative for fever.   HENT:  Negative for hearing loss.    Eyes:  Negative for visual disturbance.   Respiratory:  Negative for cough and shortness of breath.    Cardiovascular:  Negative for chest pain and palpitations.   Gastrointestinal:         As per HPI.   Genitourinary:  Negative for difficulty urinating, dysuria, frequency and hematuria.   Musculoskeletal:  Negative for arthralgias and back pain.   Skin:  Negative for color change.   Neurological:  Positive for weakness. Negative for seizures, syncope and headaches.   Hematological:  Does not bruise/bleed easily.     Objective:      Vital Signs (Most Recent):  Temp: 97.7 °F (36.5 °C) (12/19/24 0428)  Pulse: (!) 58 (12/19/24 0739)  Resp: 20 (12/19/24 0428)  BP: (!) 110/53 (12/19/24 0500)  SpO2: 99 % (12/19/24 0428) Vital Signs (24h Range):  Temp:  [97.6 °F (36.4 °C)-98 °F (36.7 °C)] 97.7 °F (36.5 °C)  Pulse:  [58-90] 58  Resp:  [14-20] 20  SpO2:  [98 %-100 %] 99 %  BP: ()/(52-75) 110/53     Weight: 59 kg (130 lb) (12/18/24 1642)  Body mass index is 17.63 kg/m².      Intake/Output Summary (Last 24 hours) at 12/19/2024 0920  Last data filed at 12/19/2024 0740  Gross per 24 hour   Intake 0 ml   Output 500 ml   Net -500 ml       Lines/Drains/Airways       Peripheral Intravenous Line  Duration                  Peripheral IV - Single Lumen 12/18/24 2044 20 G Left Antecubital <1 day                     Physical Exam  Constitutional:       General: He is not in acute distress.     Appearance: He is cachectic.   HENT:      Head: Normocephalic and atraumatic.   Eyes:      Extraocular Movements: Extraocular movements intact.   Cardiovascular:      Rate and Rhythm: Normal rate and regular rhythm.      Heart sounds: Normal heart sounds. No murmur heard.  Pulmonary:      Effort: Pulmonary effort is normal. No respiratory distress.      Breath sounds: Normal breath sounds. No wheezing.   Abdominal:      General: Bowel sounds are normal. There is no distension.      Palpations: Abdomen is soft. There is no mass.      Tenderness: There is no abdominal tenderness.   Musculoskeletal:      Cervical back: Normal range of motion and neck supple.      Right lower leg: No edema.      Left lower leg: No edema.   Skin:     General: Skin is warm and dry.      Findings: No rash.   Neurological:      Mental Status: He is alert and oriented to person, place, and time.      Cranial Nerves: No cranial nerve deficit.   Psychiatric:         Behavior: Behavior normal.          Significant Labs:  CBC:   Recent Labs   Lab 12/18/24  1834 12/19/24  0651   WBC 9.36 8.06  "  HGB 16.4 13.3*   HCT 49.1 39.1*    268     CMP:   Recent Labs   Lab 12/19/24  0651   *   CALCIUM 8.7   ALBUMIN 3.2*   PROT 6.4   *   K 3.5   CO2 23      BUN 46*   CREATININE 1.2   ALKPHOS 43   ALT 19   AST 20   BILITOT 0.9     Coagulation: No results for input(s): "PT", "INR", "APTT" in the last 48 hours.    Significant Imaging:  Imaging results within the past 24 hours have been reviewed.  Assessment/Plan:     Renal/  * MALLY (acute kidney injury)  Improving with IV hydration.     Endocrine  Protein-calorie malnutrition  BMI low but albumin 4.     Measurements:  Wt Readings from Last 1 Encounters:   12/18/24 59 kg (130 lb)   Body mass index is 17.63 kg/m².    GI  Achalasia, esophageal  Dx 2022 followed by Dr. Lomeli  S/p POEM by Dr. Gates.  Now with tolerance of solids but not liquids causing dehydration.   Albumin 4 and reportedly some recent weight gain.   Will reach out to Dr. Lomeli in [place] but will likely get an esophagram while here. Will need to arrange follow up [place] with specialists.          Thank you for your consult. I will follow-up with patient. Please contact us if you have any additional questions.    Yrn Leos PA-C  Gastroenterology  [facility]  "

## 2024-12-19 NOTE — PLAN OF CARE
See eval for details. Pt displayed deficits with functional mobility/ transfers, deficits with adl's skills also decrease b ue strength/endurance. Recommendation: LOW INTENSITY WITH 24 HOUR CARE  .

## 2024-12-19 NOTE — ED PROVIDER NOTES
Emergency Medicine Provider Note - 12/18/2024       SCRIBE NOTE: IFranc, am scribing for, and in the presence of, Krysta Yates DO.     History     Chief Complaint   Patient presents with    Weakness     Pt reports weakness with SEGOVIA for a couple of days. No reported pain currently.       Allergies:  Review of patient's allergies indicates:  No Known Allergies     History of Present Illness   HPI    12/18/2024, 6:40 PM  The history is provided by the patient and exwife.    Jose Young is a 74 y.o. male presenting to the ED for evaluation due to malnutrition. Pt has a PMHx of HTN, mixed HLD, and achalasia.    Per pt's exwife, pt had a EGD 2 years ago, but it has not relieved his sxs. She states pt has been drinking ensure, but it is not enough to provide nutrition since pt has difficulty swallowing. Pt attempting to stand up to today, but almost fell due to generalized weakness. She is concerned for malnutrition.     Pt reports he can swallow solids, but he can not swallow liquids and has to spit them out. Associated sxs include decreased appetite and generalized weakness. Pt denies fever, diarrhea, CP, chest tightness, cough, and all other sxs at this time. No prior Tx reported. No further complains or concerns at this time.       Arrival mode: [facility]     PCP: Malorie Madera DO     Past Medical History:  Past Medical History:   Diagnosis Date    Achalasia 11/07/2022    GERD (gastroesophageal reflux disease)     Hypertension     Kidney stones     Left renal mass     Lung nodule     Mixed hyperlipidemia     Weight loss        Past Surgical History:  Past Surgical History:   Procedure Laterality Date    COLONOSCOPY N/A 10/26/2022    Procedure: COLONOSCOPY;  Surgeon: Denver Ramos MD;  Location: [facility];  Service: Endoscopy;  Laterality: N/A;    ESOPHAGEAL MANOMETRY WITH MEASUREMENT OF IMPEDANCE N/A 11/07/2022    Procedure: MANOMETRY, ESOPHAGUS, WITH IMPEDANCE MEASUREMENT;   Surgeon: Delores Lomeli MD;  Location: [facility] ENDO (4TH FLR);  Service: Endoscopy;  Laterality: N/A;  instructions sent to [id number]    ESOPHAGOGASTRODUODENOSCOPY N/A 10/26/2022    Procedure: EGD (ESOPHAGOGASTRODUODENOSCOPY);  Surgeon: Denver Ramos MD;  Location: [facility] ENDO;  Service: Endoscopy;  Laterality: N/A;  Medically Urgent  10/17 fully vaccinated; instructions to [place]-    ESOPHAGOGASTRODUODENOSCOPY N/A 2022    Procedure: EGD (ESOPHAGOGASTRODUODENOSCOPY) with musocal clip application;  Surgeon: Dianne Aguirre MD;  Location: [facility] OR 2ND FLR;  Service: General;  Laterality: N/A;    MYOTOMY, PERORAL, ENDOSCOPIC N/A 2022    Procedure: MYOTOMY, PERORAL, ENDOSCOPIC POEM;  Surgeon: Dianne Aguirre MD;  Location: [facility] OR 2ND FLR;  Service: General;  Laterality: N/A;         Family History:  Family History   Problem Relation Name Age of Onset    Diabetes Father      Heart failure Father      Diabetes Sister      Colon cancer Neg Hx      Esophageal cancer Neg Hx         Social History:  Social History     Tobacco Use    Smoking status: Former     Current packs/day: 0.00     Types: Cigars, Cigarettes     Start date:      Quit date: 2022     Years since quittin.3    Smokeless tobacco: Never    Tobacco comments:     Currently uses a non-nicotine vape pen   Substance and Sexual Activity    Alcohol use: Yes     Alcohol/week: 1.0 standard drink of alcohol     Types: 1 Cans of beer per week    Drug use: Not Currently     Types: Marijuana    Sexual activity: Not Currently        Review of Systems   Review of Systems   Constitutional:  Positive for appetite change (decreased). Negative for fever.   HENT:  Positive for trouble swallowing (liquids). Negative for sore throat.    Respiratory:  Negative for cough, chest tightness and shortness of breath.    Cardiovascular:  Negative for chest pain.   Gastrointestinal:  Negative for diarrhea and nausea.   Genitourinary:  Negative  for dysuria.   Musculoskeletal:  Negative for back pain.   Skin:  Negative for rash.   Neurological:  Positive for weakness (generalized).   Hematological:  Does not bruise/bleed easily.   All other systems reviewed and are negative.     Physical Exam     Initial Vitals [12/18/24 1642]   BP Pulse Resp Temp SpO2   (!) 108/57 90 14 97.7 °F (36.5 °C) 99 %      MAP       --          Physical Exam    Nursing Notes and Vital Signs Reviewed.  Constitutional: Patient is in no apparent distress. Pt appears thin.  Head: Atraumatic. Normocephalic.  Eyes: PERRL. EOM intact. Conjunctivae are not pale. No scleral icterus.  ENT: Mucous membranes are moist. Oropharynx is clear and symmetric.    Neck: Supple. Full ROM. No lymphadenopathy.  Cardiovascular: Regular rate. Regular rhythm. No murmurs, rubs, or gallops. Distal pulses are 2+ and symmetric.  Pulmonary/Chest: No respiratory distress. Clear to auscultation bilaterally. No wheezing or rales.  Abdominal: Soft and non-distended.  There is no tenderness.  No rebound, guarding, or rigidity. Good bowel sounds.  Genitourinary: No CVA tenderness  Musculoskeletal: Moves all extremities. No obvious deformities. No edema. No calf tenderness.  Skin: Warm and dry.  Neurological:  Alert, awake, and appropriate.  Normal speech.  No acute focal neurological deficits are appreciated.  Psychiatric: Normal affect. Good eye contact. Appropriate in content.     ED Course   ED Procedures:  Critical Care    Date/Time: 12/18/2024 6:40 PM    Performed by: Krysta Yates DO  Authorized by: Dariela Amezcua MD  Direct patient critical care time: 15 minutes  Ordering / reviewing critical care time: 3 minutes  Documentation critical care time: 6 minutes  Consulting other physicians critical care time: 5 minutes  Total critical care time (exclusive of procedural time) : 29 minutes  Critical care time was exclusive of separately billable procedures and treating other patients.  Critical care was  necessary to treat or prevent imminent or life-threatening deterioration of the following conditions: renal failure.  Critical care was time spent personally by me on the following activities: blood draw for specimens, development of treatment plan with patient or surrogate, interpretation of cardiac output measurements, evaluation of patient's response to treatment, examination of patient, discussions with consultants, obtaining history from patient or surrogate, ordering and performing treatments and interventions, ordering and review of laboratory studies, ordering and review of radiographic studies, re-evaluation of patient's condition and vascular access procedures.          ED Vital Signs:  Vitals:    12/18/24 2217 12/18/24 2230 12/19/24 0026 12/19/24 0029   BP: 133/70  (!) 104/56    Pulse: 74 74 68 65   Resp: 18  19    Temp: 98 °F (36.7 °C)  97.6 °F (36.4 °C)    TempSrc: Oral  Oral    SpO2: 100%  98%    Weight:       Height:        12/19/24 0400 12/19/24 0428 12/19/24 0500 12/19/24 0739   BP:  (!) 88/52 (!) 110/53    Pulse: 60 64  (!) 58   Resp:  20     Temp:  97.7 °F (36.5 °C)     TempSrc:  Oral     SpO2:  99%     Weight:       Height:        12/19/24 1115 12/19/24 1155 12/19/24 1256 12/19/24 1306   BP: 110/61 122/67 112/62 (!) 98/57   Pulse: (!) 59 63 71 75   Resp: 16 15 16 18   Temp: 97.8 °F (36.6 °C) 97.7 °F (36.5 °C) 97.5 °F (36.4 °C)    TempSrc: Oral  Temporal    SpO2: 100% 100% 100% 100%   Weight:       Height:        12/19/24 1316 12/19/24 1337 12/19/24 1704   BP: (!) 98/57 (!) 101/58 (!) 99/53   Pulse: 73 60 (!) 57   Resp: 18 18 15   Temp:  97.4 °F (36.3 °C) 97.5 °F (36.4 °C)   TempSrc:  Oral Oral   SpO2: 100% 99% 100%   Weight:      Height:          Abnormal Lab Results:  Labs Reviewed   CBC W/ AUTO DIFFERENTIAL - Abnormal       Result Value    WBC 9.36      RBC 5.62      Hemoglobin 16.4      Hematocrit 49.1      MCV 87      MCH 29.2      MCHC 33.4      RDW 14.5      Platelets 285      MPV 9.3       Immature Granulocytes 0.3      Gran # (ANC) 7.3      Immature Grans (Abs) 0.03      Lymph # 1.4      Mono # 0.5      Eos # 0.1      Baso # 0.03      nRBC 0      Gran % 78.0 (*)     Lymph % 15.3 (*)     Mono % 5.4      Eosinophil % 0.7      Basophil % 0.3      Differential Method Automated     COMPREHENSIVE METABOLIC PANEL - Abnormal    Sodium 135 (*)     Potassium 4.4      Chloride 98      CO2 18 (*)     Glucose 95      BUN 61 (*)     Creatinine 1.8 (*)     Calcium 10.5      Total Protein 8.6 (*)     Albumin 4.1      Total Bilirubin 0.9      Alkaline Phosphatase 57      AST 25      ALT 26      eGFR 39 (*)     Anion Gap 19 (*)    TROPONIN I    Troponin I 0.006     B-TYPE NATRIURETIC PEPTIDE    BNP 28     HEPATITIS C ANTIBODY   HEP C VIRUS HOLD SPECIMEN   HIV 1 / 2 ANTIBODY        All Lab Results:  Results for orders placed or performed during the hospital encounter of 12/18/24   CBC auto differential    Collection Time: 12/18/24  6:34 PM   Result Value Ref Range    WBC 9.36 3.90 - 12.70 K/uL    RBC 5.62 4.60 - 6.20 M/uL    Hemoglobin 16.4 14.0 - 18.0 g/dL    Hematocrit 49.1 40.0 - 54.0 %    MCV 87 82 - 98 fL    MCH 29.2 27.0 - 31.0 pg    MCHC 33.4 32.0 - 36.0 g/dL    RDW 14.5 11.5 - 14.5 %    Platelets 285 150 - 450 K/uL    MPV 9.3 9.2 - 12.9 fL    Immature Granulocytes 0.3 0.0 - 0.5 %    Gran # (ANC) 7.3 1.8 - 7.7 K/uL    Immature Grans (Abs) 0.03 0.00 - 0.04 K/uL    Lymph # 1.4 1.0 - 4.8 K/uL    Mono # 0.5 0.3 - 1.0 K/uL    Eos # 0.1 0.0 - 0.5 K/uL    Baso # 0.03 0.00 - 0.20 K/uL    nRBC 0 0 /100 WBC    Gran % 78.0 (H) 38.0 - 73.0 %    Lymph % 15.3 (L) 18.0 - 48.0 %    Mono % 5.4 4.0 - 15.0 %    Eosinophil % 0.7 0.0 - 8.0 %    Basophil % 0.3 0.0 - 1.9 %    Differential Method Automated    Comprehensive metabolic panel    Collection Time: 12/18/24  6:34 PM   Result Value Ref Range    Sodium 135 (L) 136 - 145 mmol/L    Potassium 4.4 3.5 - 5.1 mmol/L    Chloride 98 95 - 110 mmol/L    CO2 18 (L) 23 - 29 mmol/L     Glucose 95 70 - 110 mg/dL    BUN 61 (H) 8 - 23 mg/dL    Creatinine 1.8 (H) 0.5 - 1.4 mg/dL    Calcium 10.5 8.7 - 10.5 mg/dL    Total Protein 8.6 (H) 6.0 - 8.4 g/dL    Albumin 4.1 3.5 - 5.2 g/dL    Total Bilirubin 0.9 0.1 - 1.0 mg/dL    Alkaline Phosphatase 57 40 - 150 U/L    AST 25 10 - 40 U/L    ALT 26 10 - 44 U/L    eGFR 39 (A) >60 mL/min/1.73 m^2    Anion Gap 19 (H) 8 - 16 mmol/L   Troponin I    Collection Time: 12/18/24  6:34 PM   Result Value Ref Range    Troponin I 0.006 0.000 - 0.026 ng/mL   Brain natriuretic peptide    Collection Time: 12/18/24  6:34 PM   Result Value Ref Range    BNP 28 0 - 99 pg/mL   EKG 12-lead    Collection Time: 12/18/24  6:36 PM   Result Value Ref Range    QRS Duration 80 ms    [facility] QTC Calculation 415 ms   Comprehensive Metabolic Panel (CMP)    Collection Time: 12/19/24  6:51 AM   Result Value Ref Range    Sodium 134 (L) 136 - 145 mmol/L    Potassium 3.5 3.5 - 5.1 mmol/L    Chloride 103 95 - 110 mmol/L    CO2 23 23 - 29 mmol/L    Glucose 145 (H) 70 - 110 mg/dL    BUN 46 (H) 8 - 23 mg/dL    Creatinine 1.2 0.5 - 1.4 mg/dL    Calcium 8.7 8.7 - 10.5 mg/dL    Total Protein 6.4 6.0 - 8.4 g/dL    Albumin 3.2 (L) 3.5 - 5.2 g/dL    Total Bilirubin 0.9 0.1 - 1.0 mg/dL    Alkaline Phosphatase 43 40 - 150 U/L    AST 20 10 - 40 U/L    ALT 19 10 - 44 U/L    eGFR >60 >60 mL/min/1.73 m^2    Anion Gap 8 8 - 16 mmol/L   CBC with Automated Differential    Collection Time: 12/19/24  6:51 AM   Result Value Ref Range    WBC 8.06 3.90 - 12.70 K/uL    RBC 4.56 (L) 4.60 - 6.20 M/uL    Hemoglobin 13.3 (L) 14.0 - 18.0 g/dL    Hematocrit 39.1 (L) 40.0 - 54.0 %    MCV 86 82 - 98 fL    MCH 29.2 27.0 - 31.0 pg    MCHC 34.0 32.0 - 36.0 g/dL    RDW 14.4 11.5 - 14.5 %    Platelets 268 150 - 450 K/uL    MPV 9.0 (L) 9.2 - 12.9 fL    Immature Granulocytes 0.2 0.0 - 0.5 %    Gran # (ANC) 5.6 1.8 - 7.7 K/uL    Immature Grans (Abs) 0.02 0.00 - 0.04 K/uL    Lymph # 1.5 1.0 - 4.8 K/uL    Mono # 0.7 0.3 - 1.0 K/uL    Eos # 0.2  0.0 - 0.5 K/uL    Baso # 0.03 0.00 - 0.20 K/uL    nRBC 0 0 /100 WBC    Gran % 69.2 38.0 - 73.0 %    Lymph % 19.0 18.0 - 48.0 %    Mono % 8.8 4.0 - 15.0 %    Eosinophil % 2.4 0.0 - 8.0 %    Basophil % 0.4 0.0 - 1.9 %    Differential Method Automated    Magnesium    Collection Time: 12/19/24  6:51 AM   Result Value Ref Range    Magnesium 2.3 1.6 - 2.6 mg/dL       Reviewed Prior Labs:   Latest Reference Range & Units 12/12/22 06:30 12/18/24 18:34   Gran % 38.0 - 73.0 % 51.5 78.0 (H)   Lymph % 18.0 - 48.0 % 34.2 15.3 (L)   (H): Data is abnormally high  (L): Data is abnormally low   Latest Reference Range & Units 12/12/22 06:30 07/12/23 10:26 12/18/24 18:34   CO2 23 - 29 mmol/L 26  18 (L)   Anion Gap 8 - 16 mmol/L 10  19 (H)   BUN 8 - 23 mg/dL 14  61 (H)   Creatinine 0.5 - 1.4 mg/dL 0.9 1.0 1.8 (H)   eGFR >60 mL/min/1.73 m^2 >60 >60.0 39 !   (L): Data is abnormally low  (H): Data is abnormally high  !: Data is abnormal    An EKG was not ordered.    ECG Results              EKG 12-lead (In process)        Collection Time Result Time QRS Duration [facility] QTC Calculation    12/18/24 18:36:17 12/19/24 08:40:49 80 415                     In process by Interface, Lab In [facility] (12/19/24 08:40:57)                   Narrative:    Test Reason : R06.02,    Vent. Rate :  71 BPM     Atrial Rate :  71 BPM     P-R Int : 130 ms          QRS Dur :  80 ms      QT Int : 382 ms       P-R-T Axes :  83  83  78 degrees    QTcB Int : 415 ms    Normal sinus rhythm  Right atrial enlargement  Septal infarct (cited on or before 29-Nov-2022)  Abnormal ECG  When compared with ECG of 29-Nov-2022 18:00,  T wave amplitude has increased in Inferior leads    Referred By: AAAREFERRAL SELF           Confirmed By:                                     Imaging Results:  Imaging Results              X-Ray Chest AP Portable (Final result)  Result time 12/18/24 18:43:05      Final result by Johnathon Branch MD (12/18/24 18:43:05)                   Impression:       No acute abnormality.      Electronically signed by: Johnathon Branch  Date:    12/18/2024  Time:    18:43               Narrative:    EXAMINATION:  XR CHEST AP PORTABLE    CLINICAL HISTORY:  CHF;    TECHNIQUE:  Single frontal view of the chest was performed.    COMPARISON:  Multiple    FINDINGS:  The lungs are clear, with normal appearance of pulmonary vasculature and no pleural effusion or pneumothorax.  Skin folds bilaterally.    The cardiac silhouette is normal in size. The hilar and mediastinal contours are unremarkable.    Bones are intact.                                            The Emergency Provider reviewed the vital signs and test results, which are outlined above.     ED Discussion   ED Medication(s):  Medications   sodium chloride 0.9% flush 3 mL (has no administration in time range)   albuterol-ipratropium 2.5 mg-0.5 mg/3 mL nebulizer solution 3 mL (has no administration in time range)   ondansetron injection 4 mg (has no administration in time range)   naloxone 0.4 mg/mL injection 0.02 mg (has no administration in time range)   glucose chewable tablet 16 g (has no administration in time range)   glucose chewable tablet 24 g (has no administration in time range)   dextrose 50% injection 12.5 g (has no administration in time range)   dextrose 50% injection 25 g (has no administration in time range)   glucagon (human recombinant) injection 1 mg (has no administration in time range)   dextrose 5 % and 0.45 % NaCl infusion ( Intravenous New Bag 12/19/24 1341)   sodium chloride 0.9% bolus 1,000 mL 1,000 mL (1,000 mLs Intravenous New Bag 12/18/24 2000)   onabotulinumtoxina injection 100 Units (100 Units Intramuscular Given 12/19/24 1252)       ED Course as of 12/19/24 1911   Wed Dec 18, 2024   1919 BUN(!): 61 [LB]   1919 Creatinine(!): 1.8 [LB]   1955 Patient unable to tolerate p.o. patient was given water to drink, after few minutes he vomited back up [LB]      ED Course User Index  [LB] Krysta Yates  DO VALENTINO       8:09 PM: Discussed case with Louann Gutierrez MD  ([facility]). Dr. Gutierrez agrees with current care and management of pt and accepts admission.   Admitting Service: [facility]  Admitting Physician: Dr. Gutierrez  Admit to: Observation, Med/Tele    8:09 PM: Re-evaluated pt. I have discussed test results, shared treatment plan, and the need for admission with patient and family at bedside. Pt and family express understanding at this time and agree with all information. All questions answered. Pt and family have no further questions or concerns at this time. Pt is ready for admit.       MIPS Measures     Smoker? Yes, former     Hypertension: History of Hypertension: The patient has elevated blood pressure (higher than 120/80) while being treated in the ED but has a history of hypertension.       Medical Decision Making                 Medical Decision Making  Differential diagnosis:  Acute kidney injury, anemia, pneumonia, congestive heart failure    ED course: CBC normal.  BUN 61.  Creatinine 1.8.  Patient's previous creatinine was 1.  This represents acute kidney injury.  Troponin 0.006, BNP 28.  Chest x-ray clear.  Recommend observation.  GI consultation.  Perhaps swallow study    Amount and/or Complexity of Data Reviewed  Independent Historian: spouse     Details: Pt's exwife provided details about medical history and current illness.  Labs: ordered. Decision-making details documented in ED Course.  Radiology: ordered. Decision-making details documented in ED Course.        Coding    Prescription Management: I performed a review of the patient's current Rx medication list as input by nursing staff.    Current Discharge Medication List        CONTINUE these medications which have NOT CHANGED    Details   ascorbic acid, vitamin C, (VITAMIN C) 250 MG tablet Take 250 mg by mouth once daily.      atorvastatin (LIPITOR) 40 MG tablet Take 1 tablet (40 mg total) by mouth once daily.  Qty: 90 tablet,  "Refills: 0    Associated Diagnoses: Hyperlipidemia, mixed      b complex vitamins tablet Take 1 tablet by mouth once daily.      cyproheptadine (PERIACTIN) 4 mg tablet Take 4 mg by mouth 3 (three) times daily. Pt needs refill      erythromycin (ROMYCIN) ophthalmic ointment APPLY A 3 MM RIBBON TO THE AFFECTED EYE AT NIGHT FOR 7 DAYS      hydroCHLOROthiazide (MICROZIDE) 12.5 mg capsule Take 1 capsule (12.5 mg total) by mouth once daily.  Qty: 30 capsule, Refills: 11    Comments: .  Associated Diagnoses: Essential hypertension      Lactobacillus combination no.8 (ADULT PROBIOTIC ORAL) Take by mouth.      ofloxacin (OCUFLOX) 0.3 % ophthalmic solution INSTILL 1 DROP INTO THE RIGHT EYE FOUR TIMES DAILY FOR 7 DAYS      omeprazole (PRILOSEC) 40 MG capsule Take 1 capsule (40 mg total) by mouth every morning.  Qty: 30 capsule, Refills: 11    Associated Diagnoses: Achalasia      vitamin D (VITAMIN D3) 1000 units Tab Take 1,000 Units by mouth once daily.              Discussed case verbally with:N/A      Portions of this note may have been created with voice recognition software. Occasional "wrong-word" or "sound-a-like" substitutions may have occurred due to the inherent limitations of voice recognition software. Please, read the note carefully and recognize, using context, where substitutions have occurred.          Clinical Impression       ICD-10-CM ICD-9-CM   1. MALLY (acute kidney injury)  N17.9 584.9   2. Shortness of breath  R06.02 786.05   3. Chest pain  R07.9 786.50   4. Achalasia, esophageal  K22.0 530.0   5. Dysphagia, unspecified type  R13.10 787.20         ED Disposition  Disposition:   Disposition: Placed in Observation  Condition: Fair         Follow-up Information       Delores Lomeli MD Follow up in 1 week(s).    Specialty: Gastroenterology  Why: Will need to follow up ASAP and again in 6 weeks for repeat EGD  Contact information:  [address]  [facility] [address]  [phone]               Santy, " DO Malorie Follow up in 1 week(s).    Specialty: Internal Medicine  Contact information:  [address] [place] [address] DR Delia HIGH [id number] [phone]                               Scribe Attestation:   Scribe #1: I performed the above scribed service and the documentation accurately describes the services I performed. I attest to the accuracy of the note.     Attending:   Physician Attestation Statement for Scribe #1: I, Krysta Yates DO, personally performed the services described in this documentation, as scribed by Franc Nelson, in my presence, and it is both accurate and complete.                 Krysta Ytaes DO  12/19/24 1911

## 2024-12-19 NOTE — ASSESSMENT & PLAN NOTE
MALLY is likely due to pre-renal azotemia due to dehydration. Baseline creatinine is  0.8-1.0 . Most recent creatinine and eGFR are listed below.  Recent Labs     12/18/24  1834   CREATININE 1.8*   EGFRNORACEVR 39*      Plan  - MALLY is secondary to poor oral intake in setting of dysphagia/vomiting and decreased appetite  - Hydrating with IV fluids  - Repeat labs in AM

## 2024-12-20 ENCOUNTER — TELEPHONE (OUTPATIENT)
Dept: INTERNAL MEDICINE | Facility: CLINIC | Age: 74
End: 2024-12-20
Payer: MEDICARE

## 2024-12-20 ENCOUNTER — TELEPHONE (OUTPATIENT)
Dept: SURGERY | Facility: CLINIC | Age: 74
End: 2024-12-20
Payer: MEDICARE

## 2024-12-20 ENCOUNTER — PATIENT MESSAGE (OUTPATIENT)
Dept: HOME HEALTH SERVICES | Facility: CLINIC | Age: 74
End: 2024-12-20
Payer: MEDICARE

## 2024-12-20 ENCOUNTER — TELEPHONE (OUTPATIENT)
Dept: HOME HEALTH SERVICES | Facility: CLINIC | Age: 74
End: 2024-12-20
Payer: MEDICARE

## 2024-12-20 PROBLEM — E43 SEVERE PROTEIN-CALORIE MALNUTRITION: Status: ACTIVE | Noted: 2024-12-18

## 2024-12-20 LAB
ALBUMIN SERPL BCP-MCNC: 2.7 G/DL (ref 3.5–5.2)
ALP SERPL-CCNC: 42 U/L (ref 40–150)
ALT SERPL W/O P-5'-P-CCNC: 17 U/L (ref 10–44)
ANION GAP SERPL CALC-SCNC: 9 MMOL/L (ref 8–16)
AST SERPL-CCNC: 19 U/L (ref 10–40)
BASOPHILS # BLD AUTO: 0.03 K/UL (ref 0–0.2)
BASOPHILS NFR BLD: 0.5 % (ref 0–1.9)
BILIRUB SERPL-MCNC: 1 MG/DL (ref 0.1–1)
BUN SERPL-MCNC: 20 MG/DL (ref 8–23)
CALCIUM SERPL-MCNC: 8.2 MG/DL (ref 8.7–10.5)
CHLORIDE SERPL-SCNC: 103 MMOL/L (ref 95–110)
CO2 SERPL-SCNC: 20 MMOL/L (ref 23–29)
CREAT SERPL-MCNC: 0.9 MG/DL (ref 0.5–1.4)
DIFFERENTIAL METHOD BLD: ABNORMAL
EOSINOPHIL # BLD AUTO: 0.2 K/UL (ref 0–0.5)
EOSINOPHIL NFR BLD: 3.2 % (ref 0–8)
ERYTHROCYTE [DISTWIDTH] IN BLOOD BY AUTOMATED COUNT: 14.3 % (ref 11.5–14.5)
EST. GFR  (NO RACE VARIABLE): >60 ML/MIN/1.73 M^2
GLUCOSE SERPL-MCNC: 118 MG/DL (ref 70–110)
HCT VFR BLD AUTO: 33 % (ref 40–54)
HGB BLD-MCNC: 11 G/DL (ref 14–18)
IMM GRANULOCYTES # BLD AUTO: 0.01 K/UL (ref 0–0.04)
IMM GRANULOCYTES NFR BLD AUTO: 0.2 % (ref 0–0.5)
LYMPHOCYTES # BLD AUTO: 1.7 K/UL (ref 1–4.8)
LYMPHOCYTES NFR BLD: 26.8 % (ref 18–48)
MAGNESIUM SERPL-MCNC: 1.9 MG/DL (ref 1.6–2.6)
MCH RBC QN AUTO: 29 PG (ref 27–31)
MCHC RBC AUTO-ENTMCNC: 33.3 G/DL (ref 32–36)
MCV RBC AUTO: 87 FL (ref 82–98)
MONOCYTES # BLD AUTO: 0.5 K/UL (ref 0.3–1)
MONOCYTES NFR BLD: 7.6 % (ref 4–15)
NEUTROPHILS # BLD AUTO: 3.8 K/UL (ref 1.8–7.7)
NEUTROPHILS NFR BLD: 61.7 % (ref 38–73)
NRBC BLD-RTO: 0 /100 WBC
PLATELET # BLD AUTO: 224 K/UL (ref 150–450)
PMV BLD AUTO: 9.5 FL (ref 9.2–12.9)
POTASSIUM SERPL-SCNC: 3.4 MMOL/L (ref 3.5–5.1)
PROT SERPL-MCNC: 5.2 G/DL (ref 6–8.4)
RBC # BLD AUTO: 3.79 M/UL (ref 4.6–6.2)
SODIUM SERPL-SCNC: 132 MMOL/L (ref 136–145)
WBC # BLD AUTO: 6.16 K/UL (ref 3.9–12.7)

## 2024-12-20 PROCEDURE — 25000003 PHARM REV CODE 250: Performed by: INTERNAL MEDICINE

## 2024-12-20 PROCEDURE — 99232 SBSQ HOSP IP/OBS MODERATE 35: CPT | Mod: ,,, | Performed by: PHYSICIAN ASSISTANT

## 2024-12-20 PROCEDURE — 97530 THERAPEUTIC ACTIVITIES: CPT

## 2024-12-20 PROCEDURE — 36415 COLL VENOUS BLD VENIPUNCTURE: CPT | Performed by: INTERNAL MEDICINE

## 2024-12-20 PROCEDURE — 97110 THERAPEUTIC EXERCISES: CPT

## 2024-12-20 PROCEDURE — 25000003 PHARM REV CODE 250: Performed by: FAMILY MEDICINE

## 2024-12-20 PROCEDURE — 80053 COMPREHEN METABOLIC PANEL: CPT | Performed by: INTERNAL MEDICINE

## 2024-12-20 PROCEDURE — 97116 GAIT TRAINING THERAPY: CPT

## 2024-12-20 PROCEDURE — 83735 ASSAY OF MAGNESIUM: CPT | Performed by: INTERNAL MEDICINE

## 2024-12-20 PROCEDURE — 21400001 HC TELEMETRY ROOM

## 2024-12-20 PROCEDURE — 85025 COMPLETE CBC W/AUTO DIFF WBC: CPT | Performed by: INTERNAL MEDICINE

## 2024-12-20 PROCEDURE — 11000001 HC ACUTE MED/SURG PRIVATE ROOM

## 2024-12-20 PROCEDURE — S5010 5% DEXTROSE AND 0.45% SALINE: HCPCS | Performed by: INTERNAL MEDICINE

## 2024-12-20 RX ORDER — SODIUM BICARBONATE 650 MG/1
1300 TABLET ORAL ONCE
Status: COMPLETED | OUTPATIENT
Start: 2024-12-20 | End: 2024-12-20

## 2024-12-20 RX ADMIN — SODIUM BICARBONATE 650 MG TABLET 1300 MG: at 03:12

## 2024-12-20 RX ADMIN — DEXTROSE AND SODIUM CHLORIDE: 5; 450 INJECTION, SOLUTION INTRAVENOUS at 12:12

## 2024-12-20 RX ADMIN — Medication 15 ML: at 03:12

## 2024-12-20 RX ADMIN — POTASSIUM BICARBONATE 25 MEQ: 978 TABLET, EFFERVESCENT ORAL at 03:12

## 2024-12-20 NOTE — SUBJECTIVE & OBJECTIVE
Interval History:  Follow up for dysphagia secondary to achalasia.  Patient doing better this morning on the full liquid diet.  Patient is stable for discharge but unable to have a ride to [place] until tomorrow morning.  Patient's daughter is plan for picked up tomorrow.  He has also been counseled on following up with his motility doctor as well as his surgeon.    Review of Systems  Objective:     Vital Signs (Most Recent):  Temp: 98.3 °F (36.8 °C) (12/20/24 1704)  Pulse: 73 (12/20/24 1704)  Resp: 16 (12/20/24 1704)  BP: (!) 100/56 (12/20/24 1704)  SpO2: 98 % (12/20/24 1704) Vital Signs (24h Range):  Temp:  [97.3 °F (36.3 °C)-98.4 °F (36.9 °C)] 98.3 °F (36.8 °C)  Pulse:  [56-73] 73  Resp:  [16-18] 16  SpO2:  [97 %-100 %] 98 %  BP: ()/(53-74) 100/56     Weight: 51.7 kg (113 lb 15.7 oz)  Body mass index is 15.46 kg/m².    Intake/Output Summary (Last 24 hours) at 12/20/2024 1705  Last data filed at 12/20/2024 0813  Gross per 24 hour   Intake 3022.73 ml   Output 850 ml   Net 2172.73 ml         Physical Exam  Vitals and nursing note reviewed.   Constitutional:       Appearance: Normal appearance.      Comments: Cachectic   HENT:      Head: Normocephalic and atraumatic.      Nose: Nose normal.      Mouth/Throat:      Mouth: Mucous membranes are moist.   Eyes:      Extraocular Movements: Extraocular movements intact.      Conjunctiva/sclera: Conjunctivae normal.   Cardiovascular:      Rate and Rhythm: Normal rate and regular rhythm.      Pulses: Normal pulses.      Heart sounds: Normal heart sounds.   Pulmonary:      Effort: Pulmonary effort is normal.      Breath sounds: Normal breath sounds.   Abdominal:      General: Abdomen is flat. Bowel sounds are normal.      Palpations: Abdomen is soft.   Musculoskeletal:         General: Normal range of motion.      Cervical back: Normal range of motion and neck supple.   Skin:     General: Skin is warm.      Capillary Refill: Capillary refill takes less than 2  seconds.   Neurological:      Mental Status: He is alert and oriented to person, place, and time. Mental status is at baseline.   Psychiatric:         Mood and Affect: Mood normal.         Behavior: Behavior normal.             Significant Labs: All pertinent labs within the past 24 hours have been reviewed.  Recent Lab Results         12/20/24  0533        Albumin 2.7       ALP 42       ALT 17       Anion Gap 9       AST 19       Baso # 0.03       Basophil % 0.5       BILIRUBIN TOTAL 1.0  Comment: For infants and newborns, interpretation of results should be based  on gestational age, weight and in agreement with clinical  observations.    Premature Infant recommended reference ranges:  Up to 24 hours.............<8.0 mg/dL  Up to 48 hours............<12.0 mg/dL  3-5 days..................<15.0 mg/dL  6-29 days.................<15.0 mg/dL         BUN 20       Calcium 8.2       Chloride 103       CO2 20       Creatinine 0.9       Differential Method Automated       eGFR >60       Eos # 0.2       Eos % 3.2       Glucose 118       Gran # (ANC) 3.8       Gran % 61.7       Hematocrit 33.0       Hemoglobin 11.0       Immature Grans (Abs) 0.01  Comment: Mild elevation in immature granulocytes is non specific and   can be seen in a variety of conditions including stress response,   acute inflammation, trauma and pregnancy. Correlation with other   laboratory and clinical findings is essential.         Immature Granulocytes 0.2       Lymph # 1.7       Lymph % 26.8       Magnesium  1.9       MCH 29.0       MCHC 33.3       MCV 87       Mono # 0.5       Mono % 7.6       MPV 9.5       nRBC 0       Platelet Count 224       Potassium 3.4       PROTEIN TOTAL 5.2       RBC 3.79       RDW 14.3       Sodium 132       WBC 6.16               Significant Imaging:   X-Ray Chest AP Portable   Final Result      No acute abnormality.         Electronically signed by: Johnathon Branch   Date:    12/18/2024   Time:    18:43

## 2024-12-20 NOTE — ASSESSMENT & PLAN NOTE
Malnutrition Type:  Context: chronic illness  Level: severe    Related to (etiology):   Physiological causes increasing nutrient needs d/t illness  Alteration in GI tract structure/function    Signs and Symptoms (as evidenced by):   BMI < 22 (older adult)  Underweight with loss of fat and muscle  Change in functional indicators ( strength)  Unable to eat sufficient energy/protein to maintain a healthy weight    Malnutrition Characteristic Summary:  Energy Intake (Malnutrition): less than 75% for greater than or equal to 1 month  Subcutaneous Fat (Malnutrition): severe depletion  Muscle Mass (Malnutrition): severe depletion  Hand  Strength, Left (Malnutrition): decreased    Interventions/Recommendations (treatment strategy):  1. General healthful, texture modified diet  2. Commercial beverage medical food supplement therapy  3. Multivitamin mineral supplement therapy  4. Feeding assistance management  5. Collaboration by nutrition professional with other providers    Nutrition Diagnosis Status:   New

## 2024-12-20 NOTE — PLAN OF CARE
Nutrition Recommendations/Interventions for malnutrition 12/20/24:   1. When medically appropriate, recommend advanced pt's diet to Regular diet, texture per SLP recommendations   2. Recommend pt continues on Boost plus TID to assist filling nutritional gaps   3. Recommend a daily MTV   4. Encourage PO and supplement intake, recommend feeding assistance as warranted   5. Weigh twice weekly  6. Collaboration by nutrition professional with other providers     Goals:   1. Pt's diet will be advanced and to safest diet texture prior to RD follow up   2. Pt will tolerate and consume >75% EEN and EPN prior to RD follow up   3. Pt will receive a daily MTV prior to RD follow up    GLYNN Hernandez, RDN, LDN

## 2024-12-20 NOTE — CONSULTS
[facility] - Med Surg  Adult Nutrition  Consult Note    SUMMARY     Recommendations    Recommendation/Intervention:   1. When medically appropriate, recommend advanced pt's diet to Regular diet, texture per SLP recommendations   2. Recommend pt continues on Boost plus TID to assist filling nutritional gaps   3. Recommend a daily MTV   4. Encourage PO and supplement intake, recommend feeding assistance as warranted   5. Weigh twice weekly    Goals:   1. Pt's diet will be advanced and to safest diet texture prior to RD follow up   2. Pt will tolerate and consume >75% EEN and EPN prior to RD follow up   3. Pt will receive a daily MTV prior to RD follow up  Nutrition Goal Status: new  Communication of RD Recs: other (comment) (POC, sticky note)    Assessment and Plan    Endocrine  Severe protein-calorie malnutrition  Malnutrition Type:  Context: chronic illness  Level: severe    Related to (etiology):   Physiological causes increasing nutrient needs d/t illness  Alteration in GI tract structure/function    Signs and Symptoms (as evidenced by):   BMI < 22 (older adult)  Underweight with loss of fat and muscle  Change in functional indicators ( strength)  Unable to eat sufficient energy/protein to maintain a healthy weight    Malnutrition Characteristic Summary:  Energy Intake (Malnutrition): less than 75% for greater than or equal to 1 month  Subcutaneous Fat (Malnutrition): severe depletion  Muscle Mass (Malnutrition): severe depletion  Hand  Strength, Left (Malnutrition): decreased    Interventions/Recommendations (treatment strategy):  1. General healthful, texture modified diet  2. Commercial beverage medical food supplement therapy  3. Multivitamin mineral supplement therapy  4. Feeding assistance management  5. Collaboration by nutrition professional with other providers    Nutrition Diagnosis Status:   New         Malnutrition Assessment (12/20/24):  Malnutrition Context: chronic illness  Malnutrition Level:  "severe  Skin (Micronutrient): cracked (flaky; Mau score = 16 (mild risk)   Micronutrient Evaluation Summary: suspected deficiency   Energy Intake (Malnutrition): less than 75% for greater than or equal to 1 month  Subcutaneous Fat (Malnutrition): severe depletion  Muscle Mass (Malnutrition): severe depletion  Hand  Strength, Left (Malnutrition): decreased   Orbital Region (Subcutaneous Fat Loss): moderate depletion  Upper Arm Region (Subcutaneous Fat Loss): severe depletion   Zoroastrian Region (Muscle Loss): severe depletion  Clavicle Bone Region (Muscle Loss): severe depletion  Clavicle and Acromion Bone Region (Muscle Loss): severe depletion  Dorsal Hand (Muscle Loss): moderate depletion  Patellar Region (Muscle Loss): severe depletion  Anterior Thigh Region (Muscle Loss): severe depletion  Posterior Calf Region (Muscle Loss): severe depletion                 Reason for Assessment    Reason For Assessment: consult, identified at risk by screening criteria (malnourishment)  Diagnosis:  (MALLY (acute kidney injury))  General Information Comments:   12/20/24: 74 y.o. Male admitted for MALLY (acute kidney injury). PMH: HLD, HTN, Esophagel achalasia, Dysphagia, Weight loss, Protein-calorie malnutrition. Pt is currently on a Full liquid diet. RD consulted for malnourishment and MST assessment. EMR noted MALLY resolved, S/p EGD with botox yesterday (12/19). Visited pt at bedside, pt sitting up in chair. Pt reported that had a poor appetite and was "forcing" himself to eat 1 meal/day + Ensure at home d/t difficulty swallowing x several months and recently d/t SOB PTA, pt confirmed that his swallowing is "much improved" and that he is tolerating the full liquid diet and is hungry, discussed protein/calorie benefits of Boost plus, pt requested chocolated flavor, encouraged PO intake + Boost as snacks, RD added to pt's orders and trays. Pt stated he is concerned to go home d/t by himself and no help to take care of him, " "encouraged pt to speak to CM or SW and inquire about home health assistance. Pt denied N/V/D, abd pain or difficulty chewing and now swallowing, stated he is unsure his UBW, reported "a lot of weight loss, like 50-60 lbs since ". NFPE performed, moderate/severe malnutrition noted, pt reported decreased L hand strength. LBM  charted. Reviewed labs, meds, weights reviewed. Weight charted 24 125 lbs, 24 113 lbs (BMI 15.46, Protein-energy malnutrition grade III), -12 lb wt loss (9% wt change) x ~8 months. RD will continue to follow and monitor pt's nutritional status during admit.    Nutrition Discharge Planning: General healthful diet until PO intake improved then Cardiac diet + texture per SLP recommendations + a daily MTV + Boot plus or pt preferred ONS as warranted    Nutrition Risk Screen    Nutrition Risk Screen: unintentional loss of 10 lbs or more in the past 2 months, difficulty chewing/swallowing    Nutrition Related Social Determinants of Health: SDOH: Adequate food in home environment and Other: sometimes able to go to the store, plans to have groceries delivered    Nutrition/Diet History    Typical Food/Fluid Intake: 1 david/day  Spiritual, Cultural Beliefs, Buddhism Practices, Values that Affect Care: no  Supplemental Drinks or Food Habits: Ensure Plus  Food Allergies: NKFA  Factors Affecting Nutritional Intake: difficulty/impaired swallowing    Anthropometrics    Temp: 98.4 °F (36.9 °C)  Height Method: Stated  Height: 6' (182.9 cm)  Height (inches): 72 in  Weight Method: Bed Scale  Weight: 51.7 kg (113 lb 15.7 oz)  Weight (lb): 113.98 lb  Ideal Body Weight (IBW), Male: 178 lb  % Ideal Body Weight, Male (lb): 64.03 %  % Ideal Body Weight Malnutrition: less than 70% -severe deficit  BMI (Calculated): 15.5  BMI Grade: less than 16 protein-energy malnutrition grade III  Weight Loss: unintentional  Usual Body Weight (UBW), k.81 kg  Weight Change Amount: 12 lb  % Usual Body Weight: " "91.2  % Weight Change From Usual Weight: -9 %       Wt Readings from Last 15 Encounters:   12/20/24 51.7 kg (113 lb 15.7 oz)   04/24/24 57.1 kg (125 lb 14.1 oz)   10/18/23 61.2 kg (135 lb)   07/12/23 61.7 kg (135 lb 14.6 oz)   02/06/23 62.3 kg (137 lb 5.6 oz)   12/14/22 60 kg (132 lb 4.4 oz)   11/29/22 60.3 kg (133 lb)   11/21/22 62.6 kg (138 lb 0.1 oz)   11/16/22 63.3 kg (139 lb 7.1 oz)   11/07/22 61.2 kg (135 lb)   10/19/22 57.5 kg (126 lb 12.2 oz)   10/17/22 60.3 kg (133 lb)   10/17/22 60.4 kg (133 lb 2.5 oz)   10/14/22 60.7 kg (133 lb 13.1 oz)   04/19/21 66.4 kg (146 lb 6.2 oz)     Lab/Procedures/Meds    Pertinent Labs Reviewed: reviewed  Pertinent Medications Reviewed: reviewed    BMP  Lab Results   Component Value Date     (L) 12/20/2024    K 3.4 (L) 12/20/2024     12/20/2024    CO2 20 (L) 12/20/2024    BUN 20 12/20/2024    CREATININE 0.9 12/20/2024    CALCIUM 8.2 (L) 12/20/2024    ANIONGAP 9 12/20/2024    EGFRNORACEVR >60 12/20/2024     Lab Results   Component Value Date    CALCIUM 8.2 (L) 12/20/2024    PHOS 3.6 12/12/2022     Lab Results   Component Value Date    ALBUMIN 2.7 (L) 12/20/2024     Lab Results   Component Value Date    ALT 17 12/20/2024    AST 19 12/20/2024    ALKPHOS 42 12/20/2024    BILITOT 1.0 12/20/2024     No results for input(s): "POCTGLUCOSE" in the last 24 hours.    No results found for: "LABA1C", "HGBA1C"    Lab Results   Component Value Date    WBC 6.16 12/20/2024    HGB 11.0 (L) 12/20/2024    HCT 33.0 (L) 12/20/2024    MCV 87 12/20/2024     12/20/2024       Scheduled Meds:  Continuous Infusions:  PRN Meds:.  Current Facility-Administered Medications:     albuterol-ipratropium, 3 mL, Nebulization, Q6H PRN    dextrose 50%, 12.5 g, Intravenous, PRN    dextrose 50%, 25 g, Intravenous, PRN    glucagon (human recombinant), 1 mg, Intramuscular, PRN    glucose, 16 g, Oral, PRN    glucose, 24 g, Oral, PRN    naloxone, 0.02 mg, Intravenous, PRN    ondansetron, 4 mg, " Intravenous, Q8H PRN    sodium chloride 0.9%, 3 mL, Intravenous, Q8H PRN      Physical Findings/Assessment         Estimated/Assessed Needs    Weight Used For Calorie Calculations: 51.7 kg (113 lb 15.7 oz)  Energy Calorie Requirements (kcal): 6254-5856 kcals (MSJ x 1.2-1.4 AF (Dysphagia vs Underweight)  Energy Need Method: [place]-St Jeor  Protein Requirements: 62-78 g (1.2-1.5 g/kg ABW (Underweight vs Dysphagia)  Weight Used For Protein Calculations: 51.7 kg (113 lb 15.7 oz)  Fluid Requirements (mL): 9699-2523 mL (1 mL/kcal)  Estimated Fluid Requirement Method: RDA Method  RDA Method (mL): 1566  CHO Requirement: 195-228 g (6467-7045 kcals/8)      Nutrition Prescription Ordered    Current Diet Order: Full liquid diet  Oral Nutrition Supplement: Boost plus TID    Evaluation of Received Nutrient/Fluid Intake  I/O: (Net since admit):  12/20/24: +1672.7 mL    Energy Calories Required: not meeting needs  Protein Required: not meeting needs  Fluid Required: exceeds needs  Total Fluid Intake (mL): 3022.7  Tolerance: tolerating  % Intake of Estimated Energy Needs: 0 - 25 %  % Meal Intake: 75 - 100 %    Nutrition Risk    Level of Risk/Frequency of Follow-up: high (F/u x 2 weekly)       Monitor and Evaluation    Food and Nutrient Intake: energy intake, food and beverage intake  Food and Nutrient Adminstration: diet order  Knowledge/Beliefs/Attitudes: food and nutrition knowledge/skill, beliefs and attitudes  Anthropometric Measurements: weight, weight change, body mass index  Biochemical Data, Medical Tests and Procedures: electrolyte and renal panel  Nutrition-Focused Physical Findings: overall appearance       Nutrition Follow-Up    RD Follow-up?: Yes  Marialuisa Snyder, BS, RDN, LDN

## 2024-12-20 NOTE — PROGRESS NOTES
"[facility] Medicine  Progress Note    Patient Name: Jose Young  MRN: [id number]  Patient Class: IP- Inpatient   Admission Date: 12/18/2024  Length of Stay: 0 days  Attending Physician: Dariela Amezcua MD  Primary Care Provider: Malorie Madera DO        Subjective     Principal Problem:MALLY (acute kidney injury)        HPI:  Patient is a 74 year old male with past medical history of hypertension, hyperlipidemia, GERD, kidney stones, left renal mass, lung nodule, and achalasia s/p myotomy 11/2022 due to dysphagia (and weight loss of 50 lbs) who presented to ED for evaluation of weakness. Family reports worsening weakness, falls, and difficulty swallowing for past several months. Per family (ex-wife) at bedside, he is not eating or drinking, he says everything "just comes back up." He had been able to tolerate solids, but does say he has no appetite and when he tries to drink liquids including Ensure, he has to spit it out, says he cannot swallow it. He has developed generalized weakness and has had some falls. He denies pain, shortness of breath, chest pain, abdominal pain, fever, chills, diarrhea.   Vital signs are stable while in ED, temp 97.7°, blood pressure 118/75, 100% SpO2 on room air.  Heart rate 82.  Lab workup shows dehydration with CO2 18, BUN 61, creatinine 1.8.  BNP 28, troponin 0.006, normal LFTs.  Chest x-ray demonstrates a lungs are clear.  While in ED, patient was given liquids to drink, was unable to tolerate and vomited right afterwards. Hospital Medicine was consulted for admission due to MALLY and dysphagia.    Overview/Hospital Course:  Mr. Young is a 74-year-old male with past medical history of achalasia presented with nausea and vomiting.  Patient had an EGD on 12/19/2024 that showed copious amounts of food and liquids.  Dr. Chavez injected Botox in all 4 quadrants and patient was started on a clear liquid diet.  Patient's MALLY has resolved with IV fluids.  Patient will be " monitored overnight and plan for possible discharge tomorrow if cleared by GI.    Interval History:  Follow up for dysphagia secondary to achalasia.  Patient underwent EGD with Botox injections in the LES on 12/19/2024.  He has been started on a clear liquid diet postprocedure.    Review of Systems  Objective:     Vital Signs (Most Recent):  Temp: 97.5 °F (36.4 °C) (12/19/24 1704)  Pulse: (!) 57 (12/19/24 1704)  Resp: 15 (12/19/24 1704)  BP: (!) 99/53 (12/19/24 1704)  SpO2: 100 % (12/19/24 1704) Vital Signs (24h Range):  Temp:  [97.4 °F (36.3 °C)-98 °F (36.7 °C)] 97.5 °F (36.4 °C)  Pulse:  [57-82] 57  Resp:  [15-20] 15  SpO2:  [98 %-100 %] 100 %  BP: ()/(52-75) 99/53     Weight: 59 kg (130 lb)  Body mass index is 17.63 kg/m².    Intake/Output Summary (Last 24 hours) at 12/19/2024 1837  Last data filed at 12/19/2024 1321  Gross per 24 hour   Intake 0 ml   Output 500 ml   Net -500 ml         Physical Exam  Vitals and nursing note reviewed.   Constitutional:       Appearance: Normal appearance. He is ill-appearing.      Comments: Cachectic   HENT:      Head: Normocephalic and atraumatic.      Nose: Nose normal.      Mouth/Throat:      Mouth: Mucous membranes are moist.   Eyes:      Extraocular Movements: Extraocular movements intact.      Conjunctiva/sclera: Conjunctivae normal.   Cardiovascular:      Rate and Rhythm: Normal rate and regular rhythm.      Pulses: Normal pulses.      Heart sounds: Normal heart sounds.   Pulmonary:      Effort: Pulmonary effort is normal.      Breath sounds: Normal breath sounds.   Abdominal:      General: Abdomen is flat. Bowel sounds are normal.      Palpations: Abdomen is soft.   Musculoskeletal:         General: Normal range of motion.      Cervical back: Normal range of motion and neck supple.   Skin:     General: Skin is warm.      Capillary Refill: Capillary refill takes less than 2 seconds.   Neurological:      Mental Status: He is alert and oriented to person, place, and  time. Mental status is at baseline.   Psychiatric:         Mood and Affect: Mood normal.         Behavior: Behavior normal.             Significant Labs: All pertinent labs within the past 24 hours have been reviewed.  Recent Lab Results         12/19/24  0651        Albumin 3.2       ALP 43       ALT 19       Anion Gap 8       AST 20       Baso # 0.03       Basophil % 0.4       BILIRUBIN TOTAL 0.9  Comment: For infants and newborns, interpretation of results should be based  on gestational age, weight and in agreement with clinical  observations.    Premature Infant recommended reference ranges:  Up to 24 hours.............<8.0 mg/dL  Up to 48 hours............<12.0 mg/dL  3-5 days..................<15.0 mg/dL  6-29 days.................<15.0 mg/dL         BUN 46       Calcium 8.7       Chloride 103       CO2 23       Creatinine 1.2       Differential Method Automated       eGFR >60       Eos # 0.2       Eos % 2.4       Glucose 145       Gran # (ANC) 5.6       Gran % 69.2       Hematocrit 39.1       Hemoglobin 13.3       Immature Grans (Abs) 0.02  Comment: Mild elevation in immature granulocytes is non specific and   can be seen in a variety of conditions including stress response,   acute inflammation, trauma and pregnancy. Correlation with other   laboratory and clinical findings is essential.         Immature Granulocytes 0.2       Lymph # 1.5       Lymph % 19.0       Magnesium  2.3       MCH 29.2       MCHC 34.0       MCV 86       Mono # 0.7       Mono % 8.8       MPV 9.0       nRBC 0       Platelet Count 268       Potassium 3.5       PROTEIN TOTAL 6.4       RBC 4.56       RDW 14.4       Sodium 134       WBC 8.06               Significant Imaging:   X-Ray Chest AP Portable   Final Result      No acute abnormality.         Electronically signed by: Johnathon Branch   Date:    12/18/2024   Time:    18:43           Assessment and Plan     * MALLY (acute kidney injury)  MALLY is likely due to pre-renal azotemia due to  dehydration. Baseline creatinine is  0.8-1.0 . Most recent creatinine and eGFR are listed below.  Recent Labs     12/18/24  1834 12/19/24  0651   CREATININE 1.8* 1.2   EGFRNORACEVR 39* >60        Plan  - MALLY is secondary to poor oral intake in setting of dysphagia/vomiting and decreased appetite  - Hydrating with IV fluids  - resolved    Protein-calorie malnutrition  Nutrition consulted. Most recent weight and BMI monitored-     Measurements:  Wt Readings from Last 1 Encounters:   12/18/24 59 kg (130 lb)   Body mass index is 17.63 kg/m².    Currently NPO due to swallowing difficulty, pending ST evaluation     Weight loss  Family reports weight loss of greater than 50 lbs due to very poor appetite/oral intake  Increased trouble swallowing over past three months    Dysphagia  Consult ST for evaluation - eval in AM if CLD tolerated  GI on board  CLD  Hydrating with IV fluids      Achalasia, esophageal  Had myotomy November 2022  EDG done on 12/19, food bolus noted which was removed.  Botox injection placed.  Patient has been counseled on making an appt with Dr. Lomeli (primary GI)        Hypertension, essential  Patient's blood pressure range in the last 24 hours was: BP  Min: 88/52  Max: 133/70.The patient's inpatient anti-hypertensive regimen is listed below:  Current Antihypertensives   No longer on medications     Plan  - BP is controlled, no changes needed to their regimen      Hyperlipidemia, mixed  Hold statin for now, while NPO        VTE Risk Mitigation (From admission, onward)           Ordered     IP VTE HIGH RISK PATIENT  Once         12/18/24 2009     Place sequential compression device  Until discontinued         12/18/24 2009     Reason for No Pharmacological VTE Prophylaxis  Once        Comments: Procedure may be warranted   Question:  Reasons:  Answer:  Physician Provided (leave comment)    12/18/24 2009                    Discharge Planning   EVI:      Code Status: Full Code   Medical Readiness for  Discharge Date:   Discharge Plan A: Home                Please place Justification for DME        Dariela Amezcua MD  Department of Hospital Medicine   '[facility]

## 2024-12-20 NOTE — ASSESSMENT & PLAN NOTE
Had myotomy November 2022  EDG done on 12/19, food bolus noted which was removed.  Botox injection placed.  Patient has been counseled on making an appt with Dr. Lomeli (primary GI)

## 2024-12-20 NOTE — TELEPHONE ENCOUNTER
----- Message from Karina sent at 12/20/2024  1:38 PM CST -----  Regarding: Virtual appt  Contact: Pt @[phone]  Pt would like appt that is scheduled on 1/8/2025 to be virtual . Please c/b to advise.. Thanks     I called and spoke to the Patient.  He is currently admitted.  I explained that I can change his appointment on 1-8-25 with Dr. RAJ Aguirre to a Virtual appointment.  Stated that he thought that he could get an earlier appointment date if he changes the appointment to a Virtual appointment.  I explained that Dr. Aguirre is out of the Office and her first Clinic day back is 1-8-25.  He said that he wants the Virtual appointment on 1-8, so he doesn't have to drive in from [place].  He did not have any questions at present.

## 2024-12-20 NOTE — ANESTHESIA POSTPROCEDURE EVALUATION
Anesthesia Post Evaluation    Patient: Jose Young    Procedure(s) Performed: Procedure(s) (LRB):  EGD w/ botox (N/A)    Final Anesthesia Type: MAC      Patient location during evaluation: GI PACU  Patient participation: Yes- Able to Participate  Level of consciousness: awake and alert  Post-procedure vital signs: reviewed and stable  Pain management: adequate  Airway patency: patent    PONV status at discharge: No PONV  Anesthetic complications: no      Cardiovascular status: blood pressure returned to baseline and hemodynamically stable  Respiratory status: unassisted, spontaneous ventilation and room air  Hydration status: euvolemic  Follow-up not needed.              Vitals Value Taken Time   BP 92/55 12/20/24 0813   Temp 36.9 °C (98.4 °F) 12/20/24 0813   Pulse 59 12/20/24 0750   Resp 16 12/20/24 0813   SpO2 99 % 12/20/24 0813         Event Time   Out of Recovery 12/19/2024 13:36:57         Pain/Anitha Score: Anitha Score: 9 (12/19/2024  1:16 PM)

## 2024-12-20 NOTE — ASSESSMENT & PLAN NOTE
MALLY is likely due to pre-renal azotemia due to dehydration. Baseline creatinine is  0.8-1.0 . Most recent creatinine and eGFR are listed below.  Recent Labs     12/18/24  1834 12/19/24  0651   CREATININE 1.8* 1.2   EGFRNORACEVR 39* >60        Plan  - MALLY is secondary to poor oral intake in setting of dysphagia/vomiting and decreased appetite  - Hydrating with IV fluids  - resolved

## 2024-12-20 NOTE — SUBJECTIVE & OBJECTIVE
Subjective:     Interval History: Patient tolerated full liquid diet. Says still has to take it slow but better than before his scope yesterday.     Review of Systems   Constitutional:         See Interval History for daily ROS.      Objective:     Vital Signs (Most Recent):  Temp: 98.4 °F (36.9 °C) (12/20/24 0813)  Pulse: (!) 59 (12/20/24 0750)  Resp: 16 (12/20/24 0813)  BP: (!) 92/55 (12/20/24 0813)  SpO2: 99 % (12/20/24 0813) Vital Signs (24h Range):  Temp:  [97.3 °F (36.3 °C)-98.4 °F (36.9 °C)] 98.4 °F (36.9 °C)  Pulse:  [56-75] 59  Resp:  [15-18] 16  SpO2:  [97 %-100 %] 99 %  BP: ()/(53-67) 92/55     Weight: 51.7 kg (113 lb 15.7 oz) (12/19/24 2340)  Body mass index is 15.46 kg/m².      Intake/Output Summary (Last 24 hours) at 12/20/2024 0925  Last data filed at 12/20/2024 0813  Gross per 24 hour   Intake 3022.73 ml   Output 850 ml   Net 2172.73 ml       Lines/Drains/Airways       Peripheral Intravenous Line  Duration                  Peripheral IV - Single Lumen 12/18/24 2044 20 G Left Antecubital 1 day                     Physical Exam  Constitutional:       General: He is not in acute distress.     Appearance: He is well-developed.   HENT:      Head: Normocephalic and atraumatic.   Eyes:      Extraocular Movements: Extraocular movements intact.   Cardiovascular:      Rate and Rhythm: Normal rate and regular rhythm.   Pulmonary:      Effort: Pulmonary effort is normal. No respiratory distress.   Abdominal:      General: Bowel sounds are normal. There is no distension.      Palpations: Abdomen is soft.      Tenderness: There is no abdominal tenderness.   Neurological:      Mental Status: He is alert and oriented to person, place, and time.      Cranial Nerves: No cranial nerve deficit.      Gait: Gait normal.   Psychiatric:         Behavior: Behavior normal.          Significant Labs:  CBC:   Recent Labs   Lab 12/18/24  1834 12/19/24  0651 12/20/24  0533   WBC 9.36 8.06 6.16   HGB 16.4 13.3* 11.0*    HCT 49.1 39.1* 33.0*    268 224     CMP:   Recent Labs   Lab 12/20/24  0533   *   CALCIUM 8.2*   ALBUMIN 2.7*   PROT 5.2*   *   K 3.4*   CO2 20*      BUN 20   CREATININE 0.9   ALKPHOS 42   ALT 17   AST 19   BILITOT 1.0         Significant Imaging:  Imaging results within the past 24 hours have been reviewed.

## 2024-12-20 NOTE — HOSPITAL COURSE
Mr. Young is a 74-year-old male with past medical history of achalasia presented with nausea and vomiting.  Patient had an EGD on 12/19/2024 that showed copious amounts of food and liquids.  Dr. Chavez injected Botox in all 4 quadrants and patient was started on a clear liquid diet.  Patient's MALLY has resolved with IV fluids.  Patient was monitored overnight and stable for discharge.  Patient will be going to [place] with his daughter and understands that he will need to follow up with his GI doctor(Dr. Connell) and his surgeon (Dr. Gates) in [place].  He will also need a repeat EGD in 6 weeks for which she has been set up with [facility] if the patient is unable to get an appointment with Dr. Connell.  Patient tolerated his full liquid diet has been counseled on food choices to prevent another food bolus.  He has been given a dose of sodium bicarb and potassium to help with the electrolyte deficiencies.  Patient's electrolyte abnormalities have resolved and patient has been counseled to take the multivitamin as prescribed.  Patient is stable for discharge at this time.

## 2024-12-20 NOTE — TELEPHONE ENCOUNTER
----- Message from Cheryl sent at 12/20/2024 10:38 AM CST -----  Contact: Daughter Dianne [phone]  Would like to receive medical advice.    Would they like a call back or a response via [facility]:  call back    Additional information:  Calling to schedule a virtual appt for a hosp follow up today.

## 2024-12-20 NOTE — PT/OT/SLP PROGRESS
Occupational Therapy  Treatment    Jose Young   MRN: [id number]   Admitting Diagnosis: MALLY (acute kidney injury)    OT Date of Treatment: 12/20/24   OT Start Time: 1045  OT Stop Time: 1100  OT Total Time (min): 15 min    Billable Minutes:  Therapeutic Activity 15 MINUTES    OT/FRANNIE: OT          General Precautions: Standard, NPO, fall  Orthopedic Precautions: N/A  Braces: N/A  Respiratory Status: Room air         Subjective:  Communicated with NURSE AND EPIC CHART REVIEW prior to session.    Pain/Comfort  Pain Rating 1: 0/10    Objective:        Functional Mobility:  Transfers:    SBA  WITH SIT<>STAND TRANSFERS  Functional Ambulation:   SBA WITH FUNCTIONAL MOBILITY 8 FEET X 2 WITH 1 STANDING BREAK  Activities of Daily Living:     TOILET ING SBA            Balance:   Static Sit: GOOD: Takes MODERATE challenges from all directions  Static Stand: FAIR+: Takes MINIMAL challenges from all directions  Dynamic stand: FAIR+: Needs CLOSE SUPERVISION during gait and is able to right self with minor LOB    Therapeutic Activities and Exercises:  Educated patient on importance of increased tolerance to upright position and direct impact on CV endurance and strength. Patient encouraged to sit up in jorge, for a minimum of 2 consecutive hours including for all meals.. Encouraged patient to perform AROM TE to BUE throughout the day within all available planes of motion. Re enforced importance of utilizing call light to meet needs in room and not attempt to get up without staff assistance. Patient verbalized understanding and agreed to comply.           AM-PAC 6 CLICK ADL   How much help from another person does this patient currently need?   1 = Unable, Total/Dependent Assistance  2 = A lot, Maximum/Moderate Assistance  3 = A little, Minimum/Contact Guard/Supervision  4 = None, Modified [place]/Independent    Putting on and taking off regular lower body clothing? : 3  Bathing (including washing, rinsing, drying)?: 3  Toileting,  "which includes using toilet, bedpan, or urinal? : 3  Putting on and taking off regular upper body clothing?: 3  Taking care of personal grooming such as brushing teeth?: 4  Eating meals?: 4  Daily Activity Total Score: 20     AM-PAC Raw Score CMS "G-Code Modifier Level of Impairment Assistance   6 % Total / Unable   7 - 8 CM 80 - 100% Maximal Assist   9-13 CL 60 - 80% Moderate Assist   14 - 19 CK 40 - 60% Moderate Assist   20 - 22 CJ 20 - 40% Minimal Assist   23 CI 1-20% SBA / CGA   24 CH 0% Independent/ Mod I       Patient left up in chair with all lines intact, call button in reach, chair alarm on, and NURSE ZEPHYER notified    ASSESSMENT:  Jose Young is a 74 y.o. male with a medical diagnosis of MALLY (acute kidney injury) and presents with DEBILITY AND GENERALIZED WEAKNESS.    Rehab identified problem list/impairments:  weakness, impaired self care skills, impaired balance, decreased safety awareness, impaired endurance, impaired functional mobility, gait instability, decreased lower extremity function    Rehab potential is good.    Activity tolerance: Good    Discharge recommendations: Low Intensity Therapy   Barriers to discharge:      Equipment recommendations: walker, rolling    GOALS:   Multidisciplinary Problems       Occupational Therapy Goals          Problem: Occupational Therapy    Goal Priority Disciplines Outcome Interventions   Occupational Therapy Goal     OT, PT/OT Progressing    Description: O.T. GOALS TO BE MET BY 1-2-25  PT WILL TOLERATE 1 SET X 15 REPS B UE ROM EXERCISE   SBA WITH LE DRESSING  SBA WITH ROLLING WALKER TOILET TRANSFER                       Plan:  Patient to be seen 2 x/week to address the above listed problems via self-care/home management, therapeutic activities, therapeutic exercises  Plan of Care expires: 01/02/25  Plan of Care reviewed with: patient         12/20/2024  "

## 2024-12-20 NOTE — PT/OT/SLP PROGRESS
Physical Therapy  Treatment    Jose Young   MRN: [id number]   Admitting Diagnosis: MALLY (acute kidney injury)    PT Received On: 12/20/24  PT Start Time: 0840     PT Stop Time: 0910    PT Total Time (min): 30 min       Billable Minutes:  Gait Training 15 and Therapeutic Exercise 15    Treatment Type: Treatment  PT/PTA: PT     Number of PTA visits since last PT visit: 0       General Precautions: Standard, NPO, fall  Orthopedic Precautions: N/A  Braces: N/A  Respiratory Status: Room air         Subjective:  Communicated with NURSE GUDELIAPHYR AND EPIC CHART REVIEW  prior to session.   PT AGREED TO TX     Pain/Comfort  Pain Rating 1: 0/10  Pain Rating Post-Intervention 1: 0/10    Objective:   Patient found with: peripheral IV, telemetry    Functional Mobility:  PT MET IN RM SUP>SIT EOB IND. GT. BELT AND  SOCKS DONNED PRIOR TO OOB MOBILITY.  PT STOOD WITH RW AND SBA FOR GT TRAINING X 450' WITH NO LOB. PT RETURNED TO RM T/F TO CHAIR WITH RW AND SBA. PT SEATED FOR REST    Treatment and Education:  PT CONT TX WITH 5' ON LBE AND COMPLETED B LE TE X 20 REPS OF AP, TKE, MIP AND GLUT SETS FOR STRENGTHENING OF LE .    AM-PAC 6 CLICK MOBILITY  How much help from another person does this patient currently need?   1 = Unable, Total/Dependent Assistance  2 = A lot, Maximum/Moderate Assistance  3 = A little, Minimum/Contact Guard/Supervision  4 = None, Modified [place]/Independent    Turning over in bed (including adjusting bedclothes, sheets and blankets)?: 4  Sitting down on and standing up from a chair with arms (e.g., wheelchair, bedside commode, etc.): 4  Moving from lying on back to sitting on the side of the bed?: 4  Moving to and from a bed to a chair (including a wheelchair)?: 4  Need to walk in hospital room?: 4  Climbing 3-5 steps with a railing?: 1  Basic Mobility Total Score: 21    AM-PAC Raw Score CMS G-Code Modifier Level of Impairment Assistance   6 % Total / Unable   7 - 9 CM 80 - 100% Maximal Assist    10 - 14 CL 60 - 80% Moderate Assist   15 - 19 CK 40 - 60% Moderate Assist   20 - 22 CJ 20 - 40% Minimal Assist   23 CI 1-20% SBA / CGA   24 CH 0% Independent/ Mod I     Patient left up in chair with call button in reach.    Assessment:  PT PROGRESSING WITH GROSS FUNC MOBILITY     Rehab identified problem list/impairments: weakness, impaired endurance, impaired self care skills, impaired functional mobility, gait instability, impaired balance, decreased ROM, decreased lower extremity function    Rehab potential is good.    Activity tolerance: Good    Discharge recommendations: Low Intensity Therapy      Barriers to discharge:      Equipment recommendations: walker, rolling     GOALS:   Multidisciplinary Problems       Physical Therapy Goals          Problem: Physical Therapy    Goal Priority Disciplines Outcome Interventions   Physical Therapy Goal     PT, PT/OT Progressing    Description: LT25  1. PT WILL COMPLETE BED MOBILITY IND  2. PT WILL STAND T/F TO CHAIR WITH RW MOD I  3. PT WILL GT TRAIN X 250' WITH RW MOD I TO PROGRESS   4. PT WILL INC AMPAC SCORE BY 2 POINTS TO PROGRESS GROSS FUNC MOBILITY.                          PLAN:    Patient to be seen 3 x/week to address the above listed problems via gait training, therapeutic activities, therapeutic exercises  Plan of Care expires: 25  Plan of Care reviewed with: patient         2024

## 2024-12-20 NOTE — ASSESSMENT & PLAN NOTE
Patient's blood pressure range in the last 24 hours was: BP  Min: 88/52  Max: 133/70.The patient's inpatient anti-hypertensive regimen is listed below:  Current Antihypertensives   No longer on medications     Plan  - BP is controlled, no changes needed to their regimen

## 2024-12-20 NOTE — PROGRESS NOTES
[facility]  Gastroenterology  Progress Note    Patient Name: Jose Young  MRN: [id number]  Admission Date: 12/18/2024  Hospital Length of Stay: 1 days  Code Status: Full Code   Attending Provider: Dariela Amezcua MD  Consulting Provider: Yrn Leos PA-C  Primary Care Physician: Malorie Madera DO  Principal Problem: MALLY (acute kidney injury)        Subjective:     Interval History: Patient tolerated full liquid diet. Says still has to take it slow but better than before his scope yesterday.     Review of Systems   Constitutional:         See Interval History for daily ROS.      Objective:     Vital Signs (Most Recent):  Temp: 98.4 °F (36.9 °C) (12/20/24 0813)  Pulse: (!) 59 (12/20/24 0750)  Resp: 16 (12/20/24 0813)  BP: (!) 92/55 (12/20/24 0813)  SpO2: 99 % (12/20/24 0813) Vital Signs (24h Range):  Temp:  [97.3 °F (36.3 °C)-98.4 °F (36.9 °C)] 98.4 °F (36.9 °C)  Pulse:  [56-75] 59  Resp:  [15-18] 16  SpO2:  [97 %-100 %] 99 %  BP: ()/(53-67) 92/55     Weight: 51.7 kg (113 lb 15.7 oz) (12/19/24 2340)  Body mass index is 15.46 kg/m².      Intake/Output Summary (Last 24 hours) at 12/20/2024 0925  Last data filed at 12/20/2024 0813  Gross per 24 hour   Intake 3022.73 ml   Output 850 ml   Net 2172.73 ml       Lines/Drains/Airways       Peripheral Intravenous Line  Duration                  Peripheral IV - Single Lumen 12/18/24 2044 20 G Left Antecubital 1 day                     Physical Exam  Constitutional:       General: He is not in acute distress.     Appearance: He is well-developed.   HENT:      Head: Normocephalic and atraumatic.   Eyes:      Extraocular Movements: Extraocular movements intact.   Cardiovascular:      Rate and Rhythm: Normal rate and regular rhythm.   Pulmonary:      Effort: Pulmonary effort is normal. No respiratory distress.   Abdominal:      General: Bowel sounds are normal. There is no distension.      Palpations: Abdomen is soft.      Tenderness: There is no abdominal  tenderness.   Neurological:      Mental Status: He is alert and oriented to person, place, and time.      Cranial Nerves: No cranial nerve deficit.      Gait: Gait normal.   Psychiatric:         Behavior: Behavior normal.          Significant Labs:  CBC:   Recent Labs   Lab 12/18/24  1834 12/19/24  0651 12/20/24  0533   WBC 9.36 8.06 6.16   HGB 16.4 13.3* 11.0*   HCT 49.1 39.1* 33.0*    268 224     CMP:   Recent Labs   Lab 12/20/24  0533   *   CALCIUM 8.2*   ALBUMIN 2.7*   PROT 5.2*   *   K 3.4*   CO2 20*      BUN 20   CREATININE 0.9   ALKPHOS 42   ALT 17   AST 19   BILITOT 1.0         Significant Imaging:  Imaging results within the past 24 hours have been reviewed.  Assessment/Plan:     GI  Achalasia, esophageal  Dx 2022 followed by Dr. Lomeli  S/p POEM by Dr. Gates.  S/p EGD with botox yesterday.   Doing better today. Tolerating full liquids.   Dr. Humphreys has placed orders for repeat EGD/botox in 6 weeks.   GI ok with d/c.         Thank you for your consult. I will sign off. Please contact us if you have any additional questions.    Yrn Leos PA-C  Gastroenterology  [facility]

## 2024-12-20 NOTE — ASSESSMENT & PLAN NOTE
Had myotomy November 2022  EDG done on 12/19, food bolus noted which was removed.  Botox injection placed.  Patient has been counseled on making an appt with Dr. Lomeli (primary GI) and Dr. Gates (surgeon)  Patient tolerating p.o. diet with full liquids

## 2024-12-20 NOTE — PROGRESS NOTES
"[facility] Medicine  Progress Note    Patient Name: Jose Young  MRN: [id number]  Patient Class: IP- Inpatient   Admission Date: 12/18/2024  Length of Stay: 1 days  Attending Physician: Dariela Amezcua MD  Primary Care Provider: Malorie Madera DO        Subjective     Principal Problem:MALLY (acute kidney injury)        HPI:  Patient is a 74 year old male with past medical history of hypertension, hyperlipidemia, GERD, kidney stones, left renal mass, lung nodule, and achalasia s/p myotomy 11/2022 due to dysphagia (and weight loss of 50 lbs) who presented to ED for evaluation of weakness. Family reports worsening weakness, falls, and difficulty swallowing for past several months. Per family (ex-wife) at bedside, he is not eating or drinking, he says everything "just comes back up." He had been able to tolerate solids, but does say he has no appetite and when he tries to drink liquids including Ensure, he has to spit it out, says he cannot swallow it. He has developed generalized weakness and has had some falls. He denies pain, shortness of breath, chest pain, abdominal pain, fever, chills, diarrhea.   Vital signs are stable while in ED, temp 97.7°, blood pressure 118/75, 100% SpO2 on room air.  Heart rate 82.  Lab workup shows dehydration with CO2 18, BUN 61, creatinine 1.8.  BNP 28, troponin 0.006, normal LFTs.  Chest x-ray demonstrates a lungs are clear.  While in ED, patient was given liquids to drink, was unable to tolerate and vomited right afterwards. Hospital Medicine was consulted for admission due to MALLY and dysphagia.    Overview/Hospital Course:  Mr. Young is a 74-year-old male with past medical history of achalasia presented with nausea and vomiting.  Patient had an EGD on 12/19/2024 that showed copious amounts of food and liquids.  Dr. Chavez injected Botox in all 4 quadrants and patient was started on a clear liquid diet.  Patient's MALLY has resolved with IV fluids.  Patient was " monitored overnight and stable for discharge.  Patient will be going to [place] with his daughter and understands that he will need to follow up with his GI doctor(Dr. Connell) and his surgeon (Dr. Gates) in [place].  He will also need a repeat EGD in 6 weeks for which she has been set up with [facility] if the patient is unable to get an appointment with Dr. Connell.  Patient tolerated his full liquid diet has been counseled on food choices to prevent another food bolus.  He has been given a dose of sodium bicarb and potassium to help with the electrolyte deficiencies.  Plan for discharge in a.m..  Medications delivered to bedside today.    Interval History:  Follow up for dysphagia secondary to achalasia.  Patient doing better this morning on the full liquid diet.  Patient is stable for discharge but unable to have a ride to [place] until tomorrow morning.  Patient's daughter is plan for picked up tomorrow.  He has also been counseled on following up with his motility doctor as well as his surgeon.    Review of Systems  Objective:     Vital Signs (Most Recent):  Temp: 98.3 °F (36.8 °C) (12/20/24 1704)  Pulse: 73 (12/20/24 1704)  Resp: 16 (12/20/24 1704)  BP: (!) 100/56 (12/20/24 1704)  SpO2: 98 % (12/20/24 1704) Vital Signs (24h Range):  Temp:  [97.3 °F (36.3 °C)-98.4 °F (36.9 °C)] 98.3 °F (36.8 °C)  Pulse:  [56-73] 73  Resp:  [16-18] 16  SpO2:  [97 %-100 %] 98 %  BP: ()/(53-74) 100/56     Weight: 51.7 kg (113 lb 15.7 oz)  Body mass index is 15.46 kg/m².    Intake/Output Summary (Last 24 hours) at 12/20/2024 1705  Last data filed at 12/20/2024 0813  Gross per 24 hour   Intake 3022.73 ml   Output 850 ml   Net 2172.73 ml         Physical Exam  Vitals and nursing note reviewed.   Constitutional:       Appearance: Normal appearance.      Comments: Cachectic   HENT:      Head: Normocephalic and atraumatic.      Nose: Nose normal.      Mouth/Throat:      Mouth: Mucous membranes are moist.   Eyes:       Extraocular Movements: Extraocular movements intact.      Conjunctiva/sclera: Conjunctivae normal.   Cardiovascular:      Rate and Rhythm: Normal rate and regular rhythm.      Pulses: Normal pulses.      Heart sounds: Normal heart sounds.   Pulmonary:      Effort: Pulmonary effort is normal.      Breath sounds: Normal breath sounds.   Abdominal:      General: Abdomen is flat. Bowel sounds are normal.      Palpations: Abdomen is soft.   Musculoskeletal:         General: Normal range of motion.      Cervical back: Normal range of motion and neck supple.   Skin:     General: Skin is warm.      Capillary Refill: Capillary refill takes less than 2 seconds.   Neurological:      Mental Status: He is alert and oriented to person, place, and time. Mental status is at baseline.   Psychiatric:         Mood and Affect: Mood normal.         Behavior: Behavior normal.             Significant Labs: All pertinent labs within the past 24 hours have been reviewed.  Recent Lab Results         12/20/24  0533        Albumin 2.7       ALP 42       ALT 17       Anion Gap 9       AST 19       Baso # 0.03       Basophil % 0.5       BILIRUBIN TOTAL 1.0  Comment: For infants and newborns, interpretation of results should be based  on gestational age, weight and in agreement with clinical  observations.    Premature Infant recommended reference ranges:  Up to 24 hours.............<8.0 mg/dL  Up to 48 hours............<12.0 mg/dL  3-5 days..................<15.0 mg/dL  6-29 days.................<15.0 mg/dL         BUN 20       Calcium 8.2       Chloride 103       CO2 20       Creatinine 0.9       Differential Method Automated       eGFR >60       Eos # 0.2       Eos % 3.2       Glucose 118       Gran # (ANC) 3.8       Gran % 61.7       Hematocrit 33.0       Hemoglobin 11.0       Immature Grans (Abs) 0.01  Comment: Mild elevation in immature granulocytes is non specific and   can be seen in a variety of conditions including stress response,    acute inflammation, trauma and pregnancy. Correlation with other   laboratory and clinical findings is essential.         Immature Granulocytes 0.2       Lymph # 1.7       Lymph % 26.8       Magnesium  1.9       MCH 29.0       MCHC 33.3       MCV 87       Mono # 0.5       Mono % 7.6       MPV 9.5       nRBC 0       Platelet Count 224       Potassium 3.4       PROTEIN TOTAL 5.2       RBC 3.79       RDW 14.3       Sodium 132       WBC 6.16               Significant Imaging:   X-Ray Chest AP Portable   Final Result      No acute abnormality.         Electronically signed by: Johnathon Branch   Date:    12/18/2024   Time:    18:43           Assessment and Plan     * MALLY (acute kidney injury)  MALLY is likely due to pre-renal azotemia due to dehydration. Baseline creatinine is  0.8-1.0 . Most recent creatinine and eGFR are listed below.  Recent Labs     12/18/24  1834 12/19/24  0651 12/20/24  0533   CREATININE 1.8* 1.2 0.9   EGFRNORACEVR 39* >60 >60        Plan  - MALLY is secondary to poor oral intake in setting of dysphagia/vomiting and decreased appetite  - Hydrating with IV fluids  - resolved    Severe protein-calorie malnutrition  Nutrition consulted. Most recent weight and BMI monitored-     Measurements:  Wt Readings from Last 1 Encounters:   12/18/24 59 kg (130 lb)   Body mass index is 17.63 kg/m².    Currently NPO due to swallowing difficulty, pending ST evaluation     Weight loss  Family reports weight loss of greater than 50 lbs due to very poor appetite/oral intake  Increased trouble swallowing over past three months    Dysphagia  Consult ST for evaluation - eval in AM if CLD tolerated  GI on board  Full liquid diet      Achalasia, esophageal  Had myotomy November 2022  EDG done on 12/19, food bolus noted which was removed.  Botox injection placed.  Patient has been counseled on making an appt with Dr. Lomeli (primary GI) and Dr. Gates (surgeon)  Patient tolerating p.o. diet with full liquids        Hypertension,  essential  Patient's blood pressure range in the last 24 hours was: BP  Min: 92/55  Max: 109/65.The patient's inpatient anti-hypertensive regimen is listed below:  Current Antihypertensives   No longer on medications     Plan  - BP is controlled, no changes needed to their regimen      Hyperlipidemia, mixed  Follow up with PCP as patient has not been on any medication        VTE Risk Mitigation (From admission, onward)           Ordered     IP VTE HIGH RISK PATIENT  Once         12/18/24 2009     Place sequential compression device  Until discontinued         12/18/24 2009     Reason for No Pharmacological VTE Prophylaxis  Once        Comments: Procedure may be warranted   Question:  Reasons:  Answer:  Physician Provided (leave comment)    12/18/24 2009                    Discharge Planning   EVI: 12/20/2024     Code Status: Full Code   Medical Readiness for Discharge Date: 12/20/2024  Discharge Plan A: Home Health                Please place Justification for DME        Dariela Amezcua MD  Department of Hospital Medicine   [facility]

## 2024-12-20 NOTE — PLAN OF CARE
[facility]  Discharge Final Note    Primary Care Provider: Malorie Madera DO    Expected Discharge Date: 12/20/2024    Final Discharge Note (most recent)       Final Note - 12/20/24 1156          Final Note    Assessment Type Final Discharge Note     Anticipated Discharge Disposition Home-Health Care Svc        Post-Acute Status    Post-Acute Authorization Home Health     Home Health Status Set-up Complete/Auth obtained                               Follow-up providers       Delores Lomeli MD   Specialty: Gastroenterology    [address] [facility] [address]   Phone: [phone]       Next Steps: Follow up in 1 week(s)    Instructions: Will need to follow up ASAP and again in 6 weeks for repeat EGD    Malorie Madera DO   Specialty: Internal Medicine   Relationship: PCP - [address] [place] [address]   Phone: [phone]       Next Steps: Follow up in 1 week(s)    Malorie Humphreys MD   Specialty: Gastroenterology    [address] [place] [address] [place] [address]   Phone: [phone]       Next Steps: Follow up in 6 week(s)    Dianne Carranza MD   Specialty: General Surgery, Bariatrics    [address] [facility] [address]   Phone: [phone]       Next Steps: Follow up in 1 week(s)              After-discharge care                Home Medical Care       *[facility]   Service: Home Rehabilitation, Home Nursing    [address] [facility] [address]  [facility] [address]   Phone: [phone]                             Pt Is going home with [facility]. Pending  approval and delivery to bedside from [facility].

## 2024-12-20 NOTE — ASSESSMENT & PLAN NOTE
Patient's blood pressure range in the last 24 hours was: BP  Min: 92/55  Max: 109/65.The patient's inpatient anti-hypertensive regimen is listed below:  Current Antihypertensives   No longer on medications     Plan  - BP is controlled, no changes needed to their regimen

## 2024-12-20 NOTE — ASSESSMENT & PLAN NOTE
Dx 2022 followed by Dr. Lomeli  S/p POEM by Dr. Gates.  S/p EGD with botox yesterday.   Doing better today. Tolerating full liquids.   Dr. Humphreys has placed orders for repeat EGD/botox in 6 weeks.   GI ok with d/c.

## 2024-12-20 NOTE — ASSESSMENT & PLAN NOTE
Consult ST for evaluation - eval in AM if CLD tolerated  GI on board  CLD  Hydrating with IV fluids

## 2024-12-20 NOTE — SUBJECTIVE & OBJECTIVE
Interval History:  Follow up for dysphagia secondary to achalasia.  Patient underwent EGD with Botox injections in the [facility] on 12/19/2024.  He has been started on a clear liquid diet postprocedure.    Review of Systems  Objective:     Vital Signs (Most Recent):  Temp: 97.5 °F (36.4 °C) (12/19/24 1704)  Pulse: (!) 57 (12/19/24 1704)  Resp: 15 (12/19/24 1704)  BP: (!) 99/53 (12/19/24 1704)  SpO2: 100 % (12/19/24 1704) Vital Signs (24h Range):  Temp:  [97.4 °F (36.3 °C)-98 °F (36.7 °C)] 97.5 °F (36.4 °C)  Pulse:  [57-82] 57  Resp:  [15-20] 15  SpO2:  [98 %-100 %] 100 %  BP: ()/(52-75) 99/53     Weight: 59 kg (130 lb)  Body mass index is 17.63 kg/m².    Intake/Output Summary (Last 24 hours) at 12/19/2024 1837  Last data filed at 12/19/2024 1321  Gross per 24 hour   Intake 0 ml   Output 500 ml   Net -500 ml         Physical Exam  Vitals and nursing note reviewed.   Constitutional:       Appearance: Normal appearance. He is ill-appearing.      Comments: Cachectic   HENT:      Head: Normocephalic and atraumatic.      Nose: Nose normal.      Mouth/Throat:      Mouth: Mucous membranes are moist.   Eyes:      Extraocular Movements: Extraocular movements intact.      Conjunctiva/sclera: Conjunctivae normal.   Cardiovascular:      Rate and Rhythm: Normal rate and regular rhythm.      Pulses: Normal pulses.      Heart sounds: Normal heart sounds.   Pulmonary:      Effort: Pulmonary effort is normal.      Breath sounds: Normal breath sounds.   Abdominal:      General: Abdomen is flat. Bowel sounds are normal.      Palpations: Abdomen is soft.   Musculoskeletal:         General: Normal range of motion.      Cervical back: Normal range of motion and neck supple.   Skin:     General: Skin is warm.      Capillary Refill: Capillary refill takes less than 2 seconds.   Neurological:      Mental Status: He is alert and oriented to person, place, and time. Mental status is at baseline.   Psychiatric:         Mood and Affect: Mood  normal.         Behavior: Behavior normal.             Significant Labs: All pertinent labs within the past 24 hours have been reviewed.  Recent Lab Results         12/19/24  0651        Albumin 3.2       ALP 43       ALT 19       Anion Gap 8       AST 20       Baso # 0.03       Basophil % 0.4       BILIRUBIN TOTAL 0.9  Comment: For infants and newborns, interpretation of results should be based  on gestational age, weight and in agreement with clinical  observations.    Premature Infant recommended reference ranges:  Up to 24 hours.............<8.0 mg/dL  Up to 48 hours............<12.0 mg/dL  3-5 days..................<15.0 mg/dL  6-29 days.................<15.0 mg/dL         BUN 46       Calcium 8.7       Chloride 103       CO2 23       Creatinine 1.2       Differential Method Automated       eGFR >60       Eos # 0.2       Eos % 2.4       Glucose 145       Gran # (ANC) 5.6       Gran % 69.2       Hematocrit 39.1       Hemoglobin 13.3       Immature Grans (Abs) 0.02  Comment: Mild elevation in immature granulocytes is non specific and   can be seen in a variety of conditions including stress response,   acute inflammation, trauma and pregnancy. Correlation with other   laboratory and clinical findings is essential.         Immature Granulocytes 0.2       Lymph # 1.5       Lymph % 19.0       Magnesium  2.3       MCH 29.2       MCHC 34.0       MCV 86       Mono # 0.7       Mono % 8.8       MPV 9.0       nRBC 0       Platelet Count 268       Potassium 3.5       PROTEIN TOTAL 6.4       RBC 4.56       RDW 14.4       Sodium 134       WBC 8.06               Significant Imaging:   X-Ray Chest AP Portable   Final Result      No acute abnormality.         Electronically signed by: Johnathon Branch   Date:    12/18/2024   Time:    18:43

## 2024-12-20 NOTE — PLAN OF CARE
12/20/24 1137   Post-Acute Status   Post-Acute Authorization Home Health   Home Health Status Referrals Sent   Discharge Plan   Discharge Plan A Home Health     SW spoke with pt and ex spouse regarding hh and dc plan. Pt and ex spouse are in agreement with [facility]. MD placed an hh order for therapy, nurse, np at home and . FRANK also provided pt with an book of sitter resources. FRANK sent [facility] referral pending acceptance and also an rw.

## 2024-12-20 NOTE — PLAN OF CARE
Discussed poc with pt, pt verbalized understanding    Purposeful rounding every 2hours    VS monitored as ordered  Cardiac monitoring in use, pt is NSR to SB  Fall precautions in place, remains injury free    IV saline locked  Accurate I&Os  Bed locked at lowest position  Call light within reach    Chart check complete  Will cont with POC      no

## 2024-12-21 VITALS
HEART RATE: 58 BPM | RESPIRATION RATE: 16 BRPM | WEIGHT: 114 LBS | BODY MASS INDEX: 15.44 KG/M2 | SYSTOLIC BLOOD PRESSURE: 92 MMHG | TEMPERATURE: 98 F | DIASTOLIC BLOOD PRESSURE: 52 MMHG | OXYGEN SATURATION: 98 % | HEIGHT: 72 IN

## 2024-12-21 LAB
ALBUMIN SERPL BCP-MCNC: 2.6 G/DL (ref 3.5–5.2)
ALP SERPL-CCNC: 39 U/L (ref 40–150)
ALT SERPL W/O P-5'-P-CCNC: 17 U/L (ref 10–44)
ANION GAP SERPL CALC-SCNC: 6 MMOL/L (ref 8–16)
AST SERPL-CCNC: 19 U/L (ref 10–40)
BASOPHILS # BLD AUTO: 0.03 K/UL (ref 0–0.2)
BASOPHILS NFR BLD: 0.4 % (ref 0–1.9)
BILIRUB SERPL-MCNC: 0.4 MG/DL (ref 0.1–1)
BUN SERPL-MCNC: 12 MG/DL (ref 8–23)
CALCIUM SERPL-MCNC: 8.6 MG/DL (ref 8.7–10.5)
CHLORIDE SERPL-SCNC: 106 MMOL/L (ref 95–110)
CO2 SERPL-SCNC: 23 MMOL/L (ref 23–29)
CREAT SERPL-MCNC: 0.8 MG/DL (ref 0.5–1.4)
DIFFERENTIAL METHOD BLD: ABNORMAL
EOSINOPHIL # BLD AUTO: 0.2 K/UL (ref 0–0.5)
EOSINOPHIL NFR BLD: 2.8 % (ref 0–8)
ERYTHROCYTE [DISTWIDTH] IN BLOOD BY AUTOMATED COUNT: 14.6 % (ref 11.5–14.5)
EST. GFR  (NO RACE VARIABLE): >60 ML/MIN/1.73 M^2
GLUCOSE SERPL-MCNC: 84 MG/DL (ref 70–110)
HCT VFR BLD AUTO: 33.4 % (ref 40–54)
HGB BLD-MCNC: 11.3 G/DL (ref 14–18)
IMM GRANULOCYTES # BLD AUTO: 0.03 K/UL (ref 0–0.04)
IMM GRANULOCYTES NFR BLD AUTO: 0.4 % (ref 0–0.5)
LYMPHOCYTES # BLD AUTO: 1.8 K/UL (ref 1–4.8)
LYMPHOCYTES NFR BLD: 26.9 % (ref 18–48)
MAGNESIUM SERPL-MCNC: 1.9 MG/DL (ref 1.6–2.6)
MCH RBC QN AUTO: 29.4 PG (ref 27–31)
MCHC RBC AUTO-ENTMCNC: 33.8 G/DL (ref 32–36)
MCV RBC AUTO: 87 FL (ref 82–98)
MONOCYTES # BLD AUTO: 0.5 K/UL (ref 0.3–1)
MONOCYTES NFR BLD: 7.8 % (ref 4–15)
NEUTROPHILS # BLD AUTO: 4.2 K/UL (ref 1.8–7.7)
NEUTROPHILS NFR BLD: 61.7 % (ref 38–73)
NRBC BLD-RTO: 0 /100 WBC
PLATELET # BLD AUTO: 219 K/UL (ref 150–450)
PMV BLD AUTO: 9.5 FL (ref 9.2–12.9)
POTASSIUM SERPL-SCNC: 3.9 MMOL/L (ref 3.5–5.1)
PROT SERPL-MCNC: 5.1 G/DL (ref 6–8.4)
RBC # BLD AUTO: 3.84 M/UL (ref 4.6–6.2)
SODIUM SERPL-SCNC: 135 MMOL/L (ref 136–145)
WBC # BLD AUTO: 6.81 K/UL (ref 3.9–12.7)

## 2024-12-21 PROCEDURE — 80053 COMPREHEN METABOLIC PANEL: CPT | Performed by: INTERNAL MEDICINE

## 2024-12-21 PROCEDURE — 83735 ASSAY OF MAGNESIUM: CPT | Performed by: INTERNAL MEDICINE

## 2024-12-21 PROCEDURE — 36415 COLL VENOUS BLD VENIPUNCTURE: CPT | Performed by: INTERNAL MEDICINE

## 2024-12-21 PROCEDURE — 25000003 PHARM REV CODE 250: Performed by: FAMILY MEDICINE

## 2024-12-21 PROCEDURE — 85025 COMPLETE CBC W/AUTO DIFF WBC: CPT | Performed by: INTERNAL MEDICINE

## 2024-12-21 RX ADMIN — Medication 15 ML: at 08:12

## 2024-12-21 NOTE — ASSESSMENT & PLAN NOTE
Patient's blood pressure range in the last 24 hours was: BP  Min: 84/51  Max: 100/56.The patient's inpatient anti-hypertensive regimen is listed below:  Current Antihypertensives   No longer on medications     Plan  - BP is controlled, no changes needed to their regimen

## 2024-12-21 NOTE — PLAN OF CARE
Discussed poc with pt, pt verbalized understanding    Purposeful rounding every 2hours    VS wnl  Cardiac monitoring in use, pt is NSR, tele monitor # [id number]  Fall precautions in place, remains injury free  Pt denies c/o pain    Accurate I&Os  Bed locked at lowest position  Call light within reach    Chart check complete  Will cont with POC

## 2024-12-21 NOTE — DISCHARGE SUMMARY
"[facility] Medicine  Discharge Summary      Patient Name: Jose Young  MRN: [id number]  NADRES: [id number]  Patient Class: IP- Inpatient  Admission Date: 12/18/2024  Hospital Length of Stay: 2 days  Discharge Date and Time: 12/21/2024 11:25 AM  Attending Physician: No att. providers found   Discharging Provider: Dariela Amezcua MD  Primary Care Provider: Malorie Madera DO    Primary Care Team: Networked reference to record PCT     HPI:   Patient is a 74 year old male with past medical history of hypertension, hyperlipidemia, GERD, kidney stones, left renal mass, lung nodule, and achalasia s/p myotomy 11/2022 due to dysphagia (and weight loss of 50 lbs) who presented to ED for evaluation of weakness. Family reports worsening weakness, falls, and difficulty swallowing for past several months. Per family (ex-wife) at bedside, he is not eating or drinking, he says everything "just comes back up." He had been able to tolerate solids, but does say he has no appetite and when he tries to drink liquids including Ensure, he has to spit it out, says he cannot swallow it. He has developed generalized weakness and has had some falls. He denies pain, shortness of breath, chest pain, abdominal pain, fever, chills, diarrhea.   Vital signs are stable while in ED, temp 97.7°, blood pressure 118/75, 100% SpO2 on room air.  Heart rate 82.  Lab workup shows dehydration with CO2 18, BUN 61, creatinine 1.8.  BNP 28, troponin 0.006, normal LFTs.  Chest x-ray demonstrates a lungs are clear.  While in ED, patient was given liquids to drink, was unable to tolerate and vomited right afterwards. Hospital Medicine was consulted for admission due to MALLY and dysphagia.    Procedure(s) (LRB):  EGD w/ botox (N/A)      Hospital Course:   Mr. Young is a 74-year-old male with past medical history of achalasia presented with nausea and vomiting.  Patient had an EGD on 12/19/2024 that showed copious amounts of food and liquids.  Dr." Kathy injected Botox in all 4 quadrants and patient was started on a clear liquid diet.  Patient's MALLY has resolved with IV fluids.  Patient was monitored overnight and stable for discharge.  Patient will be going to [place] with his daughter and understands that he will need to follow up with his GI doctor(Dr. Connell) and his surgeon (Dr. Gates) in [place].  He will also need a repeat EGD in 6 weeks for which she has been set up with [facility] if the patient is unable to get an appointment with Dr. Connell.  Patient tolerated his full liquid diet has been counseled on food choices to prevent another food bolus.  He has been given a dose of sodium bicarb and potassium to help with the electrolyte deficiencies.  Patient's electrolyte abnormalities have resolved and patient has been counseled to take the multivitamin as prescribed.  Patient is stable for discharge at this time.       Goals of Care Treatment Preferences:  Code Status: Full Code      SDOH Screening:  The patient was screened for utility difficulties, food insecurity, transport difficulties, housing insecurity, and interpersonal safety and there were no concerns identified this admission.     Consults:   Consults (From admission, onward)          Status Ordering Provider     Inpatient consult to Social Work  Once        Provider:  (Not yet assigned)    Completed SUSAN RODRIGUEZ     Inpatient consult to Gastroenterology  Once        Provider:  (Not yet assigned)    Completed JUANCHO YI     Inpatient consult to Registered Dietitian/Nutritionist  Once        Provider:  (Not yet assigned)    Completed REJI AIKEN            * Achalasia, esophageal  Had myotomy November 2022  EDG done on 12/19, food bolus noted which was removed.  Botox injection placed.  Patient has been counseled on making an appt with Dr. Lomeli (primary GI) and Dr. Gates (surgeon)  Patient tolerating p.o. diet with full liquids        Severe protein-calorie  malnutrition  Nutrition consulted. Most recent weight and BMI monitored-     Measurements:  Wt Readings from Last 1 Encounters:   12/20/24 51.7 kg (113 lb 15.7 oz)   Body mass index is 15.46 kg/m².    Continue full liquid diet    Weight loss  Family reports weight loss of greater than 50 lbs due to very poor appetite/oral intake  Increased trouble swallowing over past three months    MALLY (acute kidney injury)  MALLY is likely due to pre-renal azotemia due to dehydration. Baseline creatinine is  0.8-1.0 . Most recent creatinine and eGFR are listed below.  Recent Labs     12/18/24  1834 12/19/24  0651 12/20/24  0533   CREATININE 1.8* 1.2 0.9   EGFRNORACEVR 39* >60 >60        Plan  - MALLY is secondary to poor oral intake in setting of dysphagia/vomiting and decreased appetite  - Hydrating with IV fluids  - resolved    Dysphagia  Consult ST for evaluation - eval in AM if CLD tolerated  GI on board  Full liquid diet      Hypertension, essential  Patient's blood pressure range in the last 24 hours was: BP  Min: 84/51  Max: 100/56.The patient's inpatient anti-hypertensive regimen is listed below:  Current Antihypertensives   No longer on medications     Plan  - BP is controlled, no changes needed to their regimen      Hyperlipidemia, mixed  Follow up with PCP as patient has not been on any medication        Final Active Diagnoses:    Diagnosis Date Noted POA    PRINCIPAL PROBLEM:  Achalasia, esophageal [K22.0] 11/07/2022 Yes    Dysphagia [R13.10] 12/18/2024 Yes    MALLY (acute kidney injury) [N17.9] 12/18/2024 Yes    Weight loss [R63.4] 12/18/2024 Yes    Severe protein-calorie malnutrition [E43] 12/18/2024 Yes    Hypertension, essential [I10] 08/12/2020 Yes    Hyperlipidemia, mixed [E78.2] 08/10/2020 Yes      Problems Resolved During this Admission:       Discharged Condition: stable    [place] [facility]    Follow Up:   Contact information for follow-up providers       Delores Lomeli MD Follow up in 1  "week(s).    Specialty: Gastroenterology  Why: Will need to follow up ASAP and again in 6 weeks for repeat EGD  Contact information:  [address]  [place] [address]  [phone]               Malorie Madera DO Follow up in 1 week(s).    Specialty: Internal Medicine  Contact information:  [address] [place] [address] [address] [address]  [phone]               Malorie Humphreys MD Follow up in 6 week(s).    Specialty: Gastroenterology  Contact information:  [address] [place] [address] [place] [address]  [phone]               Dianne Carranza MD Follow up in 1 week(s).    Specialties: General Surgery, Bariatrics  Contact information:  [address]  [place] [address]  [phone]                       Contact information for after-discharge care       Home Medical Care       [facility] .    Services: Home Rehabilitation, Home Nursing  Contact information:  [address]  [facility] [id number] [phone]                                 Patient Instructions:      WALKER FOR HOME USE     Order Specific Question Answer Comments   Type of Walker: Adult (5'4"-6'6")    With wheels? Yes    Height: 6' (1.829 m)    Weight: 51.7 kg (113 lb 15.7 oz)    Length of need (1-99 months): 99    Does patient have medical equipment at home? none Simultaneous filing. User may not have seen previous data.   Please check all that apply: Patient's condition impairs ambulation.      Ambulatory referral/consult to [facility]   Standing Status: Future   Referral Priority: Routine Referral Type: Consultation   Referral Reason: Specialty Services Required   Number of Visits Requested: 1     Ambulatory referral/consult to Home Health   Referral Priority: Routine Referral Type: Home Health   Referral Reason: Specialty Services Required   Requested Specialty: Home Health Services   Number of Visits Requested: 1     Diet full liquid     Activity as " tolerated       Significant Diagnostic Studies: Labs: BMP:   Recent Labs   Lab 12/20/24  0533 12/21/24  0746   * 84   * 135*   K 3.4* 3.9    106   CO2 20* 23   BUN 20 12   CREATININE 0.9 0.8   CALCIUM 8.2* 8.6*   MG 1.9 1.9    and CBC   Recent Labs   Lab 12/20/24  0533 12/21/24  0746   WBC 6.16 6.81   HGB 11.0* 11.3*   HCT 33.0* 33.4*    219     Radiology:   X-Ray Chest AP Portable   Final Result      No acute abnormality.         Electronically signed by: Johnathon Branch   Date:    12/18/2024   Time:    18:43           Pending Diagnostic Studies:       None           Medications:  Reconciled Home Medications:      Medication List        START taking these medications      multivitamin liquid no.118 Liqd  Take 10 mLs by mouth once daily.            STOP taking these medications      ADULT PROBIOTIC ORAL     ascorbic acid (vitamin C) 250 MG tablet  Commonly known as: VITAMIN C     atorvastatin 40 MG tablet  Commonly known as: LIPITOR     b complex vitamins tablet     cyproheptadine 4 mg tablet  Commonly known as: PERIACTIN     erythromycin ophthalmic ointment  Commonly known as: ROMYCIN     hydroCHLOROthiazide 12.5 mg capsule  Commonly known as: MICROZIDE     ofloxacin 0.3 % ophthalmic solution  Commonly known as: OCUFLOX     omeprazole 40 MG capsule  Commonly known as: PRILOSEC     vitamin D 1000 units Tab  Commonly known as: VITAMIN D3              Indwelling Lines/Drains at time of discharge:   Lines/Drains/Airways       None                   Time spent on the discharge of patient: 40 minutes         Dariela Amezcua MD  Department of Hospital Medicine  '[facility]

## 2024-12-21 NOTE — ASSESSMENT & PLAN NOTE
Nutrition consulted. Most recent weight and BMI monitored-     Measurements:  Wt Readings from Last 1 Encounters:   12/20/24 51.7 kg (113 lb 15.7 oz)   Body mass index is 15.46 kg/m².    Continue full liquid diet

## 2024-12-21 NOTE — PLAN OF CARE
Discussed poc with pt, pt verbalized understanding    Purposeful rounding every 2hours    VS wnl  Cardiac monitoring in use, pt is NSR, tele monitor # [id number]  Fall precautions in place, remains injury free  Pt denies c/o pain or nausea    Bed locked at lowest position  Call light within reach    Pt to d/c to home

## 2024-12-21 NOTE — PLAN OF CARE
Pt is stable. No sx of acute distress  Purposeful rounding   Denies any pain   Full liquid diet   LBM: 12/17    Cardiac monitor: [id number]; NS    Chart orders reviewed. Bed in lowest position, wheels locked, call light within reach. Pt remains injury free. Will cont POC

## 2024-12-22 LAB
OHS QRS DURATION: 80 MS
OHS QTC CALCULATION: 415 MS

## 2024-12-23 ENCOUNTER — TELEPHONE (OUTPATIENT)
Dept: INTERNAL MEDICINE | Facility: CLINIC | Age: 74
End: 2024-12-23
Payer: MEDICARE

## 2024-12-23 NOTE — TELEPHONE ENCOUNTER
----- Message from EnidQovia sent at 12/23/2024  4:00 PM CST -----  Contact: Home Health  ..Type: Orders Request     What orders/ testing are being requested? Verbal confirmation for home Health and health Plan approval     Is there a future appointment scheduled for the patient with PCP?no     When?     Would you prefer a response via OMsignal? Call back Clare Yo  [phone]     Comments:

## 2025-01-02 ENCOUNTER — OFFICE VISIT (OUTPATIENT)
Dept: GASTROENTEROLOGY | Facility: CLINIC | Age: 75
End: 2025-01-02
Payer: MEDICARE

## 2025-01-02 ENCOUNTER — TELEPHONE (OUTPATIENT)
Dept: GASTROENTEROLOGY | Facility: CLINIC | Age: 75
End: 2025-01-02
Payer: MEDICARE

## 2025-01-02 DIAGNOSIS — K22.0 ACHALASIA: Primary | ICD-10-CM

## 2025-01-02 NOTE — PROGRESS NOTES
The patient location is: [place]  The chief complaint leading to consultation is: dysphagia    Visit type: audiovisual    Face to Face time with patient: 5 minutes  10 minutes of total time spent on the encounter, which includes face to face time and non-face to face time preparing to see the patient (eg, review of tests), Obtaining and/or reviewing separately obtained history, Documenting clinical information in the electronic or other health record, Independently interpreting results (not separately reported) and communicating results to the patient/family/caregiver, or Care coordination (not separately reported).         Each patient to whom he or she provides medical services by telemedicine is:  (1) informed of the relationship between the physician and patient and the respective role of any other health care provider with respect to management of the patient; and (2) notified that he or she may decline to receive medical services by telemedicine and may withdraw from such care at any time.    Notes:     [facility]    Referral from Dr. Humphreys    CC: dysphagia    HPI 74 y.o. male with past medical history of achalasia s/p POEM in 2022 who began to have recurrent, severe, solid and liquid food dysphagia with associated regurgitation about 1 month ago.  EGD performed 12/19/24 showed food in the esophagus and an esophagus consistent with achalasia.  Botox was injected into the LES.    Since the patient had his botox injection he is 100% better.  He is now eating and drinking without difficulty and without regurgitation.    He has rare acid reflux and he takes pepcid AC for this.    Past Medical History  Past Medical History:   Diagnosis Date    Achalasia 11/07/2022    GERD (gastroesophageal reflux disease)     Hypertension     Kidney stones     Left renal mass     Lung nodule     Mixed hyperlipidemia     Weight loss          Physical Examination  There were no vitals taken for this visit.  General  appearance: alert, cooperative, no distress  HENT: Normocephalic, atraumatic, neck symmetrical, no nasal discharge   Neurologic: Alert and oriented X 3, no facial droop or dysarthria    Labs:  Lab Results   Component Value Date    WBC 6.81 12/21/2024    HGB 11.3 (L) 12/21/2024    HCT 33.4 (L) 12/21/2024    MCV 87 12/21/2024     12/21/2024         CMP  Sodium   Date Value Ref Range Status   12/21/2024 135 (L) 136 - 145 mmol/L Final     Potassium   Date Value Ref Range Status   12/21/2024 3.9 3.5 - 5.1 mmol/L Final     Chloride   Date Value Ref Range Status   12/21/2024 106 95 - 110 mmol/L Final     CO2   Date Value Ref Range Status   12/21/2024 23 23 - 29 mmol/L Final     Glucose   Date Value Ref Range Status   12/21/2024 84 70 - 110 mg/dL Final     BUN   Date Value Ref Range Status   12/21/2024 12 8 - 23 mg/dL Final     Creatinine   Date Value Ref Range Status   12/21/2024 0.8 0.5 - 1.4 mg/dL Final     Calcium   Date Value Ref Range Status   12/21/2024 8.6 (L) 8.7 - 10.5 mg/dL Final     Total Protein   Date Value Ref Range Status   12/21/2024 5.1 (L) 6.0 - 8.4 g/dL Final     Albumin   Date Value Ref Range Status   12/21/2024 2.6 (L) 3.5 - 5.2 g/dL Final     Total Bilirubin   Date Value Ref Range Status   12/21/2024 0.4 0.1 - 1.0 mg/dL Final     Comment:     For infants and newborns, interpretation of results should be based  on gestational age, weight and in agreement with clinical  observations.    Premature Infant recommended reference ranges:  Up to 24 hours.............<8.0 mg/dL  Up to 48 hours............<12.0 mg/dL  3-5 days..................<15.0 mg/dL  6-29 days.................<15.0 mg/dL       Alkaline Phosphatase   Date Value Ref Range Status   12/21/2024 39 (L) 40 - 150 U/L Final     AST   Date Value Ref Range Status   12/21/2024 19 10 - 40 U/L Final     ALT   Date Value Ref Range Status   12/21/2024 17 10 - 44 U/L Final     Anion Gap   Date Value Ref Range Status   12/21/2024 6 (L) 8 - 16 mmol/L  Final     eGFR   Date Value Ref Range Status   12/21/2024 >60 >60 mL/min/1.73 m^2 Final           Assessment:   The patient is a 75 yo man with Type II achalasia s/p POEM in 2022 with recurrent symptoms.  He recently underwent EGD with Botox to the LES with resolution of his dysphagia and regurgitation.    Plan:  -Schedule repeat esophageal manometry with dysphagia protocol.  -Follow up with Dr. Yajaira Aguirre as previously scheduled.    Delores Lomeli MD

## 2025-01-02 NOTE — TELEPHONE ENCOUNTER
----- Message from Eve sent at 1/2/2025  9:40 AM CST -----  Regarding: patient call back  Type: Patient Call Back    Who called: Self     What is the request in detail: asked for a call back to get his apt r/s schedule doesn't come up when searching     Can the clinic reply by [facility]? No     Would the patient rather a call back or a response via [facility]? Call     Best call back number: .[phone]

## 2025-01-02 NOTE — TELEPHONE ENCOUNTER
MA spoke with patient. Mr. Young requesting a later appointment for today. Virtual appointment scheduled for 3:00.

## 2025-01-03 ENCOUNTER — TELEPHONE (OUTPATIENT)
Dept: ENDOSCOPY | Facility: HOSPITAL | Age: 75
End: 2025-01-03
Payer: MEDICARE

## 2025-01-03 NOTE — TELEPHONE ENCOUNTER
Ma spoke with pt , pt declined to schedule procedure and wants to speak with MD and staff. Pt states he was miserable last time he had that test done and says he don't want to go through with it again   Pleaser advise pt

## 2025-01-03 NOTE — TELEPHONE ENCOUNTER
MA spoke with the patient. He thought he was having the POEM procedure done. I explained that he's being scheduled for a manometry test, which is a different procedure. Questions answered to patient's satisfaction. Mr. Young stated, he will think about it and give me a call back when he's ready to schedule.     Message sent to provider.

## 2025-01-03 NOTE — TELEPHONE ENCOUNTER
Delores Lomeli MD  You; Adela Recinos MA25 minutes ago (11:17 AM)       I just spoke with him, he says he is ok with scheduling his esophageal manometry.  Can you reach back out to him to schedule?      Adela Recinos MA Spera, Melissa A., MD41 minutes ago (11:01 AM)     SHANTELL Maxwell,    I spoke with the patient. He thought he was having the POEM procedure done. I explained that he's being scheduled for a manometry test, which is a different procedure. Patient stated, he will think about it and give me a call back when he's ready to schedule.    It's documented in his chart.     Adela Recinos MA42 minutes ago (11:01 AM)     SHANTELL GÓMEZ spoke with the patient. He thought he was having the POEM procedure done. I explained that he's being scheduled for a manometry test, which is a different procedure. Questions answered to patient's satisfaction. Mr. Young stated, he will think about it and give me a call back when he's ready to schedule.      Message sent to provider.          Note      Delores Lomeli MD  You; [employer] Staff58 minutes ago (10:44 AM)       I just saw him in clinic yesterday and discussed it with him.    [facility] staff, please reach out to him to let him know that I highly recommend that he proceed with the esophageal manometry to re evaluate his achalasia so that we can treat him appropriately.  If he declines to schedule please document that on the chart.      You routed conversation to [employer] Staff; Delores Lomeli MD1 hour ago (10:23 AM)     You1 hour ago (10:23 AM)     OPAL Gómez spoke with pt , pt declined to schedule procedure and wants to speak with MD and staff. Pt states he was miserable last time he had that test done and says he don't want to go through with it again   Pleaser advise pt          Note

## 2025-01-03 NOTE — TELEPHONE ENCOUNTER
I just called the patient pt declined the procedure and he states he spoke with his family , pt states he will just continue to get Botox and he apologize for the inconvenience

## 2025-01-03 NOTE — TELEPHONE ENCOUNTER
----- Message from Delores Lomeli MD sent at 1/2/2025  3:06 PM CST -----  Regarding: Esophageal manometry with dysphagia protocol  Hello,    Can you schedule this patient for an esophageal manometry with dysphagia protocol?  I recommend one day of full liquids prior to his manometry.  His diagnosis is achalasia/dysphagia.  Thanks!    Delores

## 2025-01-08 ENCOUNTER — OFFICE VISIT (OUTPATIENT)
Dept: SURGERY | Facility: CLINIC | Age: 75
End: 2025-01-08
Payer: MEDICARE

## 2025-01-08 DIAGNOSIS — K22.0 ACHALASIA: Primary | ICD-10-CM

## 2025-01-08 PROCEDURE — 98005 SYNCH AUDIO-VIDEO EST LOW 20: CPT | Mod: 95,,, | Performed by: SURGERY

## 2025-01-08 PROCEDURE — 1111F DSCHRG MED/CURRENT MED MERGE: CPT | Mod: CPTII,95,, | Performed by: SURGERY

## 2025-01-08 PROCEDURE — 1160F RVW MEDS BY RX/DR IN RCRD: CPT | Mod: CPTII,95,, | Performed by: SURGERY

## 2025-01-08 PROCEDURE — 1159F MED LIST DOCD IN RCRD: CPT | Mod: CPTII,95,, | Performed by: SURGERY

## 2025-01-08 NOTE — PROGRESS NOTES
The patient location is: At home in [place], [place]  The chief complaint leading to consultation is: Follow-up hospital admission for recurrent dysphagia    Visit type: audiovisual    Face to Face time with patient: 20  30 minutes of total time spent on the encounter, which includes face to face time and non-face to face time preparing to see the patient (eg, review of tests), Obtaining and/or reviewing separately obtained history, Documenting clinical information in the electronic or other health record, Independently interpreting results (not separately reported) and communicating results to the patient/family/caregiver, or Care coordination (not separately reported).     Each patient to whom he or she provides medical services by telemedicine is:  (1) informed of the relationship between the physician and patient and the respective role of any other health care provider with respect to management of the patient; and (2) notified that he or she may decline to receive medical services by telemedicine and may withdraw from such care at any time.    Notes:   History & Physical    Subjective     History of Present Illness:  Patient is a 74 year-old male with HTN and HLD with h/o type II achalasia with pre-op Eckardt 7/12 s/p POEM 11/29/22 c/b delayed healing of mucosotomy for which he was NPO on TPN x2 weeks. Since resuming a regular diet at the time of his last visit 12/14/22 he was doing well with resolution of his symptoms with Eckardt 0/12. He started to notice return of dysphagia a couple of months ago first to solids, in particular meats. This progressively worsened very rapidly and for about 2-3 weeks in early December he developed dysphagia to liquids with regurgitation and associated weight loss. He was admitted 12/18/24 - 12/21/24 with weakness and MALLY due to dehydration. He underwent EGD 12/19/24 which was personally reviewed during which a severely dilated esophagus without appreciable motility was noted  as well as a hypertonic LES which was injected with 100U Botox. Since then he has again experienced complete resolution of his symptoms with current Eckardt 0/12. He is eating a diverse diet with solids and reports weigh regain to about 140lbs which would correspond with 25-30 lbs from his admission. He is happy and does not seek further surgical intervention at this time.    Chief Complaint   Patient presents with    Follow-up     Review of patient's allergies indicates:  No Known Allergies    Current Outpatient Medications   Medication Sig Dispense Refill    ascorbic acid (VITAMIN C ORAL) Take by mouth.      ergocalciferol, vitamin D2, (VITAMIN D ORAL) Take by mouth.      multivitamin liquid no.118 Liqd Take 10 mLs by mouth once daily. 237 mL 0     No current facility-administered medications for this visit.     Past Medical History:   Diagnosis Date    Achalasia 11/07/2022    GERD (gastroesophageal reflux disease)     Hypertension     Kidney stones     Left renal mass     Lung nodule     Mixed hyperlipidemia     Weight loss      Past Surgical History:   Procedure Laterality Date    COLONOSCOPY N/A 10/26/2022    Procedure: COLONOSCOPY;  Surgeon: Denver Ramos MD;  Location: [facility];  Service: Endoscopy;  Laterality: N/A;    ESOPHAGEAL MANOMETRY WITH MEASUREMENT OF IMPEDANCE N/A 11/07/2022    Procedure: MANOMETRY, ESOPHAGUS, WITH IMPEDANCE MEASUREMENT;  Surgeon: Delores Lomeli MD;  Location: [address]);  Service: Endoscopy;  Laterality: N/A;  instructions sent to [email or web address]    ESOPHAGOGASTRODUODENOSCOPY N/A 10/26/2022    Procedure: EGD (ESOPHAGOGASTRODUODENOSCOPY);  Surgeon: Denver Ramos MD;  Location: [facility];  Service: Endoscopy;  Laterality: N/A;  Medically Urgent  10/17 fully vaccinated; instructions to [place]    ESOPHAGOGASTRODUODENOSCOPY N/A 11/30/2022    Procedure: EGD (ESOPHAGOGASTRODUODENOSCOPY) with musocal clip application;  Surgeon: Dianne Aguirre MD;  Location: [facility]  2ND FLR;  Service: General;  Laterality: N/A;    ESOPHAGOGASTRODUODENOSCOPY N/A 2024    Procedure: EGD w/ botox;  Surgeon: Malorie Humphreys MD;  Location: [facility];  Service: Gastroenterology;  Laterality: N/A;    MYOTOMY, PERORAL, ENDOSCOPIC N/A 2022    Procedure: MYOTOMY, PERORAL, ENDOSCOPIC POEM;  Surgeon: Dianne Aguirre MD;  Location: [facility] OR 2ND FLR;  Service: General;  Laterality: N/A;     Family History   Problem Relation Name Age of Onset    Diabetes Father      Heart failure Father      Diabetes Sister      Colon cancer Neg Hx      Esophageal cancer Neg Hx       Social History     Tobacco Use    Smoking status: Former     Current packs/day: 0.00     Types: Cigars, Cigarettes     Start date:      Quit date: 2022     Years since quittin.4    Smokeless tobacco: Never    Tobacco comments:     Currently uses a non-nicotine vape pen   Substance Use Topics    Alcohol use: Yes     Alcohol/week: 1.0 standard drink of alcohol     Types: 1 Cans of beer per week    Drug use: Not Currently     Types: Marijuana      Review of Systems:  Review of Systems   Constitutional:  Negative for fatigue.   HENT:  Negative for trouble swallowing.    Eyes: Negative.    Respiratory: Negative.     Cardiovascular: Negative.    Gastrointestinal: Negative.    Endocrine: Negative.    Genitourinary: Negative.    Musculoskeletal: Negative.    Skin: Negative.    Allergic/Immunologic: Negative.    Neurological: Negative.    Hematological: Negative.    Psychiatric/Behavioral: Negative.          Objective     Physical Exam:  Physical Exam  Constitutional:       General: He is not in acute distress.  Pulmonary:      Effort: No respiratory distress.   Neurological:      Mental Status: He is alert and oriented to person, place, and time.   Psychiatric:         Mood and Affect: Mood normal.         Behavior: Behavior normal.         Thought Content: Thought content normal.       Diagnostic Results:  EGD:  Reviewed       Assessment and Plan   Patient is a 74 year-old male with achalasia s/p POEM 11/29/22 with excellent post-op response who was recently admitted for recurrent achalasia responsive to EGD with Botox. He states he is doing very well today with rapid weight regain and Eckardt 0/12. He is aware of his symptoms and will return should he redevelop dysphagia. He will follow-up with Dr. Lomeli per routine and RTC with me PRN. All questions were answered to his satisfaction and he is in agreement with the plan. It was a pleasure seeing Mr. Young today.     Dianne Aguirre  1/8/25

## 2025-01-13 ENCOUNTER — TELEPHONE (OUTPATIENT)
Dept: HOME HEALTH SERVICES | Facility: CLINIC | Age: 75
End: 2025-01-13
Payer: MEDICARE

## 2025-01-14 ENCOUNTER — TELEPHONE (OUTPATIENT)
Dept: HOME HEALTH SERVICES | Facility: CLINIC | Age: 75
End: 2025-01-14

## 2025-02-20 ENCOUNTER — DOCUMENT SCAN (OUTPATIENT)
Dept: HOME HEALTH SERVICES | Facility: HOSPITAL | Age: 75
End: 2025-02-20
Payer: MEDICARE

## 2025-02-25 ENCOUNTER — PATIENT MESSAGE (OUTPATIENT)
Dept: GASTROENTEROLOGY | Facility: CLINIC | Age: 75
End: 2025-02-25
Payer: MEDICARE

## 2025-02-25 ENCOUNTER — TELEPHONE (OUTPATIENT)
Dept: GASTROENTEROLOGY | Facility: CLINIC | Age: 75
End: 2025-02-25
Payer: MEDICARE

## 2025-02-25 NOTE — TELEPHONE ENCOUNTER
Repeat EGD with Botox in 6 weeks  Patient to arrange GI follow up with Dr. Lomeli, GI at [facility]. Discussed with patient.

## 2025-02-25 NOTE — TELEPHONE ENCOUNTER
----- Message from Betzy sent at 2/25/2025  9:41 AM CST -----  Contact: [phone]  [email or web address] Patient is calling for Medical Advice regarding:speak to you in regards to the surgery he had How long has patient had these symptoms:Pharmacy name and phone#:Patient wants a call back or thru [facility]:call back Comments:Please advise and give return call he is asking for another surgery for the Botox Please advise patient replies from provider may take up to 48 hours.

## 2025-03-03 ENCOUNTER — TELEPHONE (OUTPATIENT)
Dept: GASTROENTEROLOGY | Facility: CLINIC | Age: 75
End: 2025-03-03
Payer: MEDICARE

## 2025-03-03 ENCOUNTER — OFFICE VISIT (OUTPATIENT)
Dept: INTERNAL MEDICINE | Facility: CLINIC | Age: 75
End: 2025-03-03
Payer: MEDICARE

## 2025-03-03 VITALS
DIASTOLIC BLOOD PRESSURE: 74 MMHG | TEMPERATURE: 96 F | HEIGHT: 72 IN | WEIGHT: 139.44 LBS | RESPIRATION RATE: 20 BRPM | HEART RATE: 90 BPM | BODY MASS INDEX: 18.89 KG/M2 | SYSTOLIC BLOOD PRESSURE: 138 MMHG | OXYGEN SATURATION: 100 %

## 2025-03-03 DIAGNOSIS — K22.0 ACHALASIA, ESOPHAGEAL: Primary | ICD-10-CM

## 2025-03-03 DIAGNOSIS — E43 SEVERE PROTEIN-CALORIE MALNUTRITION: ICD-10-CM

## 2025-03-03 DIAGNOSIS — R63.4 WEIGHT LOSS: ICD-10-CM

## 2025-03-03 DIAGNOSIS — R13.10 DYSPHAGIA, UNSPECIFIED TYPE: ICD-10-CM

## 2025-03-03 PROCEDURE — 3078F DIAST BP <80 MM HG: CPT | Mod: CPTII,S$GLB,, | Performed by: PHYSICIAN ASSISTANT

## 2025-03-03 PROCEDURE — 1126F AMNT PAIN NOTED NONE PRSNT: CPT | Mod: CPTII,S$GLB,, | Performed by: PHYSICIAN ASSISTANT

## 2025-03-03 PROCEDURE — 3288F FALL RISK ASSESSMENT DOCD: CPT | Mod: CPTII,S$GLB,, | Performed by: PHYSICIAN ASSISTANT

## 2025-03-03 PROCEDURE — 1101F PT FALLS ASSESS-DOCD LE1/YR: CPT | Mod: CPTII,S$GLB,, | Performed by: PHYSICIAN ASSISTANT

## 2025-03-03 PROCEDURE — G2211 COMPLEX E/M VISIT ADD ON: HCPCS | Mod: S$GLB,,, | Performed by: PHYSICIAN ASSISTANT

## 2025-03-03 PROCEDURE — 99999 PR PBB SHADOW E&M-EST. PATIENT-LVL III: CPT | Mod: PBBFAC,,, | Performed by: PHYSICIAN ASSISTANT

## 2025-03-03 PROCEDURE — 3008F BODY MASS INDEX DOCD: CPT | Mod: CPTII,S$GLB,, | Performed by: PHYSICIAN ASSISTANT

## 2025-03-03 PROCEDURE — 1159F MED LIST DOCD IN RCRD: CPT | Mod: CPTII,S$GLB,, | Performed by: PHYSICIAN ASSISTANT

## 2025-03-03 PROCEDURE — 99214 OFFICE O/P EST MOD 30 MIN: CPT | Mod: S$GLB,,, | Performed by: PHYSICIAN ASSISTANT

## 2025-03-03 PROCEDURE — 1160F RVW MEDS BY RX/DR IN RCRD: CPT | Mod: CPTII,S$GLB,, | Performed by: PHYSICIAN ASSISTANT

## 2025-03-03 PROCEDURE — 3075F SYST BP GE 130 - 139MM HG: CPT | Mod: CPTII,S$GLB,, | Performed by: PHYSICIAN ASSISTANT

## 2025-03-03 NOTE — PROGRESS NOTES
Subjective:       Patient ID: Jose Young is a 74 y.o. male.    CHIEF COMPLAINT:  - Jose presents for follow-up regarding a previous Botox injection in his throat and to inquire about scheduling another procedure.    HPI:  - Jose had a Botox injection in his throat on December 19th to address difficulty with food going down. Since the procedure, he reports improvement in his ability to swallow and has gained a few lbs. He was informed that the effects of Botox would last about 6 weeks. He expresses a preference for Botox over a different procedure he had done in [place], which he found less effective. He reports feeling much better with the Botox treatment, noting improvements in his swallowing and overall condition. He is interested in scheduling another Botox procedure, as he believes he may be overdue for a repeat treatment. He confirms that he is still able to swallow well and has maintained the weight gain, currently weighing around 141 lbs, up from a previous weight of about 125 lbs.  - He denies any other symptoms or problems.    SURGICAL HISTORY:  - Botox injection in throat: December 19th, for difficulty swallowing food. Outcome: improved swallowing and weight gain.  - Previous procedure in [place]: Date not specified, different from Botox, less effective for swallowing issues.    TEST RESULTS:  - Botox injection to throat: December 19th, for difficulty swallowing food    IMAGING:  - EGD (Esophagogastroduodenoscopy): Date not specified, performed in [place], different procedure than Botox, less effective for patient's swallowing issues         Review of Systems   Constitutional:  Negative for chills, fatigue, fever and unexpected weight change.   HENT:  Negative for congestion, dental problem, ear pain, hearing loss, rhinorrhea and trouble swallowing.    Eyes:  Negative for pain and visual disturbance.   Respiratory:  Negative for cough and shortness of breath.    Cardiovascular:  Negative  for chest pain, palpitations and leg swelling.   Gastrointestinal:  Negative for abdominal distention, abdominal pain, blood in stool, constipation, diarrhea, nausea and vomiting.   Genitourinary:  Negative for difficulty urinating.   Musculoskeletal:  Negative for arthralgias and myalgias.   Skin:  Negative for rash.   Neurological:  Negative for dizziness, weakness, numbness and headaches.   Hematological:  Negative for adenopathy. Does not bruise/bleed easily.   Psychiatric/Behavioral:  Negative for agitation, dysphoric mood and sleep disturbance.        Objective:      Physical Exam  Vitals reviewed.   Constitutional:       General: He is not in acute distress.     Appearance: Normal appearance. He is well-developed and normal weight. He is not ill-appearing or toxic-appearing.   HENT:      Head: Normocephalic and atraumatic.   Eyes:      General: Lids are normal. No scleral icterus.     Extraocular Movements: Extraocular movements intact.      Conjunctiva/sclera: Conjunctivae normal.      Pupils: Pupils are equal, round, and reactive to light.   Cardiovascular:      Rate and Rhythm: Normal rate and regular rhythm.      Heart sounds: Normal heart sounds. No murmur heard.     No friction rub. No gallop.   Pulmonary:      Effort: Pulmonary effort is normal.      Breath sounds: Normal breath sounds. No decreased breath sounds, wheezing, rhonchi or rales.   Neurological:      General: No focal deficit present.      Mental Status: He is alert and oriented to person, place, and time. Mental status is at baseline.      Cranial Nerves: No cranial nerve deficit.      Gait: Gait normal.   Psychiatric:         Mood and Affect: Mood and affect normal.         Behavior: Behavior normal.         Thought Content: Thought content normal.         Judgment: Judgment normal.         Assessment:       1. Achalasia, esophageal    2. Dysphagia, unspecified type    3. Severe protein-calorie malnutrition    4. Weight loss        Plan:    1. Achalasia, esophageal    2. Dysphagia, unspecified type    3. Severe protein-calorie malnutrition    4. Weight loss        Assessment & Plan    IMPRESSION:  - Reviewed patient's history of Botox treatment for dysphagia on 12/19/2025   - Noted patient's improvement in swallowing and weight gain since Botox treatment  - Determined patient is overdue for repeat Botox procedure  - Contacted GI team to arrange follow-up    ACHALASIA AND DYSPHAGIA:   Jose reports food is staying down and weight gain since the Botox surgery on throat in December for swallowing issues.   Acknowledged the need for follow-up with GI doctor    Jose had Botox surgery on throat in December by GI            Needs follow up with Dr. Juliann ramos with GI.   Needs annual visit with Dr. Madera- next available, or with [facility].         This note was generated with the assistance of ambient listening technology. Verbal consent was obtained by the patient and accompanying visitor(s) for the recording of patient appointment to facilitate this note. I attest to having reviewed and edited the generated note for accuracy, though some syntax or spelling errors may persist. Please contact the author of this note for any clarification.        Visit today included increased complexity associated with the care of the episodic problem hypertension, emphysema, and achalasia, which was addressed while instituting co-management of the longitudinal care of the patient due to the serious and/or complex managed problem(s) .    I have evaluated and discussed management associated with medical care services that serve as the continuing focal point for all needed health care services and/or with medical care services that are part of ongoing care related to my patient's single, serious condition or a complex condition(s).    I am providing ongoing care and I am the primary care provider for this patient, and they are being managed, monitored, and/or observed for  their chronic conditions over time.     I have addressed their ongoing health maintenance requirements and needs for all health care services and reviewed co-management plans provided by specialty providers when available.    Health Maintenance Due   Topic Date Due    TETANUS VACCINE  Never done    Pneumococcal Vaccines (Age 50+) (1 of 2 - PCV) Never done    Shingles Vaccine (1 of 2) Never done    RSV Vaccine (Age 60+ and Pregnant patients) (1 - Risk 60-74 years 1-dose series) Never done    Influenza Vaccine (1) Never done    COVID-19 Vaccine (2 - 2024-25 season) 09/01/2024

## 2025-03-03 NOTE — Clinical Note
Good Morning, the patient came in for a follow up (with his PCP) and is requesting his follow up with GI (he didn't realize he needed to make his follow up). He is also overdue for his EGD with botox, he is requesting if you can you please order/set that up? He said he was told it would be done in [place], not [place]. I appreciate your help!

## 2025-03-03 NOTE — TELEPHONE ENCOUNTER
----- Message from Nurse Encarnacion sent at 3/3/2025 10:56 AM CST -----  Regarding: follow up ov needed  Please contact patient to schedule a follow up appt ASAP

## 2025-03-06 ENCOUNTER — PATIENT MESSAGE (OUTPATIENT)
Dept: GASTROENTEROLOGY | Facility: CLINIC | Age: 75
End: 2025-03-06
Payer: MEDICARE

## 2025-03-06 ENCOUNTER — EXTERNAL HOME HEALTH (OUTPATIENT)
Dept: HOME HEALTH SERVICES | Facility: HOSPITAL | Age: 75
End: 2025-03-06
Payer: MEDICARE

## 2025-03-06 NOTE — PROGRESS NOTES
Called pt, no answer. Sent a msg to pt on my chart, the msg was reviewed but not responded back to.   Sent a second msg to pt on my chart-scheduled an appt for evaluation with Leela Swain in [place].

## 2025-03-07 ENCOUNTER — TELEPHONE (OUTPATIENT)
Dept: GASTROENTEROLOGY | Facility: CLINIC | Age: 75
End: 2025-03-07
Payer: MEDICARE

## 2025-03-07 NOTE — TELEPHONE ENCOUNTER
----- Message from Delores Lomeli MD sent at 3/5/2025  9:37 AM CST -----  He cancelled his manometry  ----- Message -----  From: Adela Recinos MA  Sent: 3/5/2025   9:33 AM CST  To: Delores Lomeli MD    Hi, There seems to be some confusion about what is going on with this patient. Is he do for a procedure with you?  ----- Message -----  From: Diomedes Vivas RN  Sent: 3/5/2025   8:52 AM CST  To: Malorie Madera DO; Mey Gomez LPN; J#    Good morning. I do not see a current, active order for a HREM. Per the telephone conversation dated 1/3, the patient ultimately declined to schedule. I'm assuming at that point the order from Dr. Lomeli on 1/2 was cancelled. Therefore, he would need a new order placed for the N[place] locale. I've CC'd in the two MAs that spoke to Mr. Young on 1/3. He seemed to be vacillating quite a bit regarding repeating the study. Please, let me know if I may be of further assistance. Thank you.Diomedes  ----- Message -----  From: Malorie Humphreys MD  Sent: 3/4/2025   7:20 PM CST  To: Malorie Madera DO; Mey Gomez LPN; J#    Unfortunately this is a regular occurrence with this patient. According to Dr. Lomeli's last clinic note in 01/2025 she wanted the manometry to be complete in [place]. Follow with Dr. Aguirre is ALSO in [place]. Diomedes, can you assist in contacting the motility lab in [place] to schedule this patient for the manometry Dr. Lomeli requested back in 01/2025.Inga  ----- Message -----  From: Tory Jimenez PA-C  Sent: 3/4/2025  12:13 PM CST  To: Malorie Madera DO; Mey Gomez LPN; J#    Dr. Humphreys, Thank you for following up, the patient erroneously followed up with me instead of GI. He's not sure how to schedule his GI follow ups or who he needs to follow up with. We will try to schedule him a follow up with Dr. Aguirre. Does Dr. Aguirre need to order the esophageal manometry with dysphagia protocol? Or can someone please contact him to  schedule this procedure prior to that visit if it needs to be done first?Many Thanks,Tory Jimenez PA-C  ----- Message -----  From: Malorie Humphreys MD  Sent: 3/3/2025  11:48 AM CST  To: Malorie Madera DO; Mey Gomez LPN; T#    TiffanyNot sure if your have reviewed our GI notes and messages on this patient. He has achalasia and chronic dysphagia. He is lost to follow up and does not follow the recommendations made to him until he becomes symptomatic.According to Dr. Lomeli's note from 1/2/25 she recommended -Schedule repeat esophageal manometry with dysphagia protocol.-Follow up with Dr. Yajaira Aguirre as previously scheduled.He has not done this and this is what needs to be completed instead of a EGD with Botox. This is what needs to be completed.  ----- Message -----  From: Tory Jimenez PA-C  Sent: 3/3/2025  10:29 AM CST  To: Malorie Humphreys MD    Good Morning, the patient came in for a follow up (with his PCP) and is requesting his follow up with GI (he didn't realize he needed to make his follow up). He is also overdue for his EGD with botox, he is requesting if you can you please order/set that up? He said he was told it would be done in [place], not [place]. I appreciate your help!

## 2025-03-12 ENCOUNTER — TELEPHONE (OUTPATIENT)
Dept: GASTROENTEROLOGY | Facility: CLINIC | Age: 75
End: 2025-03-12
Payer: MEDICARE

## 2025-03-12 ENCOUNTER — TELEPHONE (OUTPATIENT)
Dept: ENDOSCOPY | Facility: HOSPITAL | Age: 75
End: 2025-03-12
Payer: MEDICARE

## 2025-03-12 DIAGNOSIS — K22.0 ACHALASIA, ESOPHAGEAL: Primary | ICD-10-CM

## 2025-03-12 NOTE — TELEPHONE ENCOUNTER
Patient was contacted and states he's not coming to [place] for the manometry , Patient states he is scheduled for an audio appointment to schedule his EGD in [place] on 3/14 and only wants Botox   Please advise

## 2025-03-12 NOTE — TELEPHONE ENCOUNTER
[facility] Note    The patient has refused to schedule esophageal manometry in [place] for a second time.  Follow up with [facility] in [place] as scheduled.    Delores Lomeli MD

## 2025-03-12 NOTE — TELEPHONE ENCOUNTER
----- Message from Delores Lomeli MD sent at 3/12/2025  3:00 PM CDT -----  Regarding: Esophageal manometry  [facility],Can you schedule Mr. Young for an esophageal manometry with dysphagia protocol?  Indication:  achalasia and dysphagia.Thanks,Delores

## 2025-03-14 ENCOUNTER — HOSPITAL ENCOUNTER (OUTPATIENT)
Dept: PREADMISSION TESTING | Facility: HOSPITAL | Age: 75
Discharge: HOME | End: 2025-03-14
Attending: INTERNAL MEDICINE
Payer: MEDICARE

## 2025-03-14 DIAGNOSIS — K22.0 ACHALASIA, ESOPHAGEAL: Primary | ICD-10-CM

## 2025-03-17 ENCOUNTER — TELEPHONE (OUTPATIENT)
Dept: PREADMISSION TESTING | Facility: HOSPITAL | Age: 75
End: 2025-03-17
Payer: MEDICARE

## 2025-03-19 ENCOUNTER — TELEPHONE (OUTPATIENT)
Dept: PREADMISSION TESTING | Facility: HOSPITAL | Age: 75
End: 2025-03-19
Payer: MEDICARE

## 2025-03-19 NOTE — TELEPHONE ENCOUNTER
received msg Dr. Humphreys said pt needs to have procedure done earlier than 04/04/2025. spoke with patient on Friday, 3/17/2025. Pt refused to have procedure done earlier than 4/04/2025. Pt states he scheduled a ride, and is coming then.

## 2025-04-04 ENCOUNTER — HOSPITAL ENCOUNTER (OUTPATIENT)
Dept: ENDOSCOPY | Facility: HOSPITAL | Age: 75
Discharge: HOME | End: 2025-04-04
Attending: INTERNAL MEDICINE | Admitting: INTERNAL MEDICINE
Payer: MEDICARE

## 2025-04-04 ENCOUNTER — ANESTHESIA EVENT (OUTPATIENT)
Dept: ENDOSCOPY | Facility: HOSPITAL | Age: 75
End: 2025-04-04
Payer: MEDICARE

## 2025-04-04 ENCOUNTER — ANESTHESIA (OUTPATIENT)
Dept: ENDOSCOPY | Facility: HOSPITAL | Age: 75
End: 2025-04-04
Payer: MEDICARE

## 2025-04-04 VITALS
WEIGHT: 148 LBS | BODY MASS INDEX: 20.05 KG/M2 | HEIGHT: 72 IN | DIASTOLIC BLOOD PRESSURE: 84 MMHG | TEMPERATURE: 98 F | HEART RATE: 61 BPM | RESPIRATION RATE: 19 BRPM | OXYGEN SATURATION: 100 % | SYSTOLIC BLOOD PRESSURE: 148 MMHG

## 2025-04-04 DIAGNOSIS — R13.10 DYSPHAGIA, UNSPECIFIED TYPE: Primary | ICD-10-CM

## 2025-04-04 DIAGNOSIS — K22.0 ACHALASIA, ESOPHAGEAL: ICD-10-CM

## 2025-04-04 DIAGNOSIS — K22.0 ACHALASIA OF ESOPHAGUS: ICD-10-CM

## 2025-04-04 PROCEDURE — 43236 UPPR GI SCOPE W/SUBMUC INJ: CPT | Mod: 74 | Performed by: INTERNAL MEDICINE

## 2025-04-04 PROCEDURE — 37000008 HC ANESTHESIA 1ST 15 MINUTES

## 2025-04-04 PROCEDURE — 63600175 PHARM REV CODE 636 W HCPCS: Mod: JZ,TB | Performed by: INTERNAL MEDICINE

## 2025-04-04 PROCEDURE — 43236 UPPR GI SCOPE W/SUBMUC INJ: CPT | Mod: 53,,, | Performed by: INTERNAL MEDICINE

## 2025-04-04 PROCEDURE — 63600175 PHARM REV CODE 636 W HCPCS: Performed by: NURSE ANESTHETIST, CERTIFIED REGISTERED

## 2025-04-04 RX ORDER — SODIUM CHLORIDE 9 MG/ML
INJECTION, SOLUTION INTRAVENOUS CONTINUOUS
Status: DISCONTINUED | OUTPATIENT
Start: 2025-04-04 | End: 2025-04-05 | Stop reason: HOSPADM

## 2025-04-04 RX ORDER — LIDOCAINE HYDROCHLORIDE 10 MG/ML
INJECTION, SOLUTION EPIDURAL; INFILTRATION; INTRACAUDAL; PERINEURAL
Status: DISCONTINUED | OUTPATIENT
Start: 2025-04-04 | End: 2025-04-04

## 2025-04-04 RX ORDER — PROPOFOL 10 MG/ML
VIAL (ML) INTRAVENOUS
Status: DISCONTINUED | OUTPATIENT
Start: 2025-04-04 | End: 2025-04-04

## 2025-04-04 RX ADMIN — PROPOFOL 80 MG: 10 INJECTION, EMULSION INTRAVENOUS at 11:04

## 2025-04-04 RX ADMIN — ONABOTULINUMTOXINA 100 UNITS: 100 INJECTION, POWDER, LYOPHILIZED, FOR SOLUTION INTRADERMAL; INTRAMUSCULAR at 11:04

## 2025-04-04 RX ADMIN — PROPOFOL 30 MG: 10 INJECTION, EMULSION INTRAVENOUS at 11:04

## 2025-04-04 RX ADMIN — LIDOCAINE HYDROCHLORIDE 50 MG: 10 SOLUTION INTRAVENOUS at 11:04

## 2025-04-04 NOTE — ANESTHESIA PREPROCEDURE EVALUATION
04/04/2025  Jose Young is a 74 y.o., male.  Patient Active Problem List   Diagnosis    Cigar smoker motivated to quit    Elevated PSA    Hyperlipidemia, mixed    Hypertension, essential    Achalasia, esophageal    Left renal mass    Pulmonary nodule    Nephrolithiasis    Atherosclerosis of aorta    Coronary atherosclerosis    Emphysema lung    Dysphagia    MALLY (acute kidney injury)    Weight loss    Severe protein-calorie malnutrition         Pre-op Assessment    I have reviewed the Patient Summary Reports.    I have reviewed the NPO Status.   I have reviewed the Medications.     Review of Systems  Anesthesia Hx:  No problems with previous Anesthesia             Denies Family Hx of Anesthesia complications.    Denies Personal Hx of Anesthesia complications.                    Social:  Non-Smoker       Hematology/Oncology:  Hematology Normal   Oncology Normal                                   EENT/Dental:  EENT/Dental Normal           Cardiovascular:     Hypertension   CAD              ECG has been reviewed.                            Pulmonary:   COPD, mild                     Renal/:  Chronic Renal Disease renal calculi               Hepatic/GI:     GERD                Musculoskeletal:  Musculoskeletal Normal                Neurological:  Neurology Normal                                      Endocrine:  Endocrine Normal            Dermatological:  Skin Normal    Psych:  Psychiatric Normal                    Physical Exam  General: Well nourished, Cooperative, Alert and Oriented    Airway:  Mallampati: II   Mouth Opening: Normal  TM Distance: Normal  Tongue: Normal  Neck ROM: Normal ROM    Dental:  Intact  Pt denies loose or removable dentition  Heart:  Rate: Normal  Rhythm: Regular Rhythm        Anesthesia Plan  Type of Anesthesia, risks & benefits discussed:    Anesthesia Type: MAC, Gen Natural  Airway  Intra-op Monitoring Plan: Standard ASA Monitors  Post Op Pain Control Plan: multimodal analgesia  Induction:  IV  Informed Consent: Informed consent signed with the Patient and all parties understand the risks and agree with anesthesia plan.  All questions answered.   ASA Score: 2  Day of Surgery Review of History & Physical: H&P Update referred to the surgeon/provider.    Ready For Surgery From Anesthesia Perspective.     .

## 2025-04-04 NOTE — TRANSFER OF CARE
Anesthesia Transfer of Care Note    Patient: Jose Young    Procedure(s) Performed: * No procedures listed *    Patient location: GI    Anesthesia Type: MAC    Transport from OR: Transported from OR on room air with adequate spontaneous ventilation    Post pain: adequate analgesia    Post assessment: no apparent anesthetic complications and tolerated procedure well    Post vital signs: stable    Level of consciousness: responds to stimulation    Nausea/Vomiting: no nausea/vomiting    Complications: none    Transfer of care protocol was followed      Last vitals: Visit Vitals  BP (!) 150/78 (BP Location: Left arm, Patient Position: Lying)   Pulse 64   Temp 36.5 °C (97.7 °F)   Resp 17   Ht 6' (1.829 m)   Wt 67.1 kg (148 lb)   SpO2 99%   BMI 20.07 kg/m²

## 2025-04-04 NOTE — H&P (VIEW-ONLY)
PRE PROCEDURE H&P    Patient Name: Jose Young  MRN: [id number]  : 1950  Date of Procedure:  2025  Referring Physician: Malorie Humphreys MD  Primary Physician: Malorie Madera DO  Procedure Physician: Malorie Humphreys MD       Planned Procedure: EGD  Diagnosis:   achlasia    Chief Complaint: Same as above    HPI: Patient is an 74 y.o. male is here for the above. Hx achalasia s/p POEM in , followed by Dr. Lomeli GI at [facility]. He was last seen by her 2025. Underwent EGD with Botox 2024 with symptom relief of solid and liquid food dysphagia. Esophageal manometry requested but patient has declined on numerous occasions.     Patient seen this morning. Reports positive response to EGD with Botox but states the esophageal manometry did not improve this symptoms. Educated patient that manometry evaluated the movement of the esophagus and is not therapeutic. It helps guide future therapy as well. He is no amenable to manometry. Denies blood thinners.     Last colonoscopy:   Family history: none  Anticoagulation: none    Past Medical History:   Past Medical History:   Diagnosis Date    Achalasia 2022    GERD (gastroesophageal reflux disease)     Hypertension     Kidney stones     Left renal mass     Lung nodule     Mixed hyperlipidemia     Weight loss         Past Surgical History:  Past Surgical History:   Procedure Laterality Date    COLONOSCOPY N/A 10/26/2022    Procedure: COLONOSCOPY;  Surgeon: Denver Ramos MD;  Location: [facility];  Service: Endoscopy;  Laterality: N/A;    ESOPHAGEAL MANOMETRY WITH MEASUREMENT OF IMPEDANCE N/A 2022    Procedure: MANOMETRY, ESOPHAGUS, WITH IMPEDANCE MEASUREMENT;  Surgeon: Delores Lomeli MD;  Location: [address]);  Service: Endoscopy;  Laterality: N/A;  instructions sent to [facility]    ESOPHAGOGASTRODUODENOSCOPY N/A 10/26/2022    Procedure: EGD (ESOPHAGOGASTRODUODENOSCOPY);  Surgeon: Denver Ramos MD;  Location: [facility];   Service: Endoscopy;  Laterality: N/A;  Medically Urgent  10/17 fully vaccinated; instructions to portal-st    ESOPHAGOGASTRODUODENOSCOPY N/A 11/30/2022    Procedure: EGD (ESOPHAGOGASTRODUODENOSCOPY) with musocal clip application;  Surgeon: Dianne Aguirre MD;  Location: [address];  Service: General;  Laterality: N/A;    ESOPHAGOGASTRODUODENOSCOPY N/A 12/19/2024    Procedure: EGD w/ botox;  Surgeon: Malorie Humphreys MD;  Location: [facility] ENDO;  Service: Gastroenterology;  Laterality: N/A;    MYOTOMY, PERORAL, ENDOSCOPIC N/A 11/29/2022    Procedure: MYOTOMY, PERORAL, ENDOSCOPIC POEM;  Surgeon: Dianne Aguirre MD;  Location: [address];  Service: General;  Laterality: N/A;        Home Medications:  Prior to Admission medications    Medication Sig Start Date End Date Taking? Authorizing Provider   ascorbic acid (VITAMIN C ORAL) Take by mouth.   Yes Provider, Historical   ergocalciferol, vitamin D2, (VITAMIN D ORAL) Take by mouth.   Yes Provider, Historical        Allergies:  Review of patient's allergies indicates:  No Known Allergies     Social History:   Social History[1]    Family History:  Family History   Problem Relation Name Age of Onset    Diabetes Father      Heart failure Father      Diabetes Sister      Colon cancer Neg Hx      Esophageal cancer Neg Hx         ROS: No acute cardiac events, no acute respiratory complaints.     Physical Exam (all patients):    BP (!) 150/78 (BP Location: Left arm, Patient Position: Lying)   Pulse 64   Temp 97.7 °F (36.5 °C)   Resp 17   Ht 6' (1.829 m)   Wt 67.1 kg (148 lb)   SpO2 99%   BMI 20.07 kg/m²   Lungs: Clear to auscultation bilaterally, respirations unlabored  Heart: Regular rate and rhythm, S1 and S2 normal, no obvious murmurs  Abdomen:         Soft, non-tender, bowel sounds normal, no masses, no organomegaly    Lab Results   Component Value Date    WBC 6.81 12/21/2024    MCV 87 12/21/2024    RDW 14.6 (H) 12/21/2024      2024    GLU 84 2024    BUN 12 2024     (L) 2024    K 3.9 2024     2024        SEDATION PLAN: per anesthesia      History reviewed, vital signs satisfactory, cardiopulmonary status satisfactory, sedation options, risks and plans have been discussed with the patient  All their questions were answered and the patient agrees to the sedation procedures as planned and the patient is deemed an appropriate candidate for the sedation as planned.    The risks, benefits and alternatives of the procedure were discussed with the patient in detail. This discussion was had in the presence of endoscopy staff. The risks include, risks of adverse reaction to sedation requiring the use of reversal agents, bleeding requiring blood transfusion, perforation requiring surgical intervention and technical failure. Other risks include aspiration leading to respiratory distress and respiratory failure resulting in endotracheal intubation and mechanical ventilation including death. If anesthesia is being utilized for this procedure, it is up to the anesthesiologist to determine airway safety including elective endotracheal intubation. Questions were answered, they agree to proceed. There was no language barriers.      Procedure explained to patient, informed consent obtained and placed in chart.    Malorie Humphreys  2025  11:30 AM          [1]   Social History  Socioeconomic History    Marital status: Single   Tobacco Use    Smoking status: Former     Current packs/day: 0.00     Types: Cigars, Cigarettes     Start date:      Quit date: 2022     Years since quittin.6    Smokeless tobacco: Never    Tobacco comments:     Currently uses a non-nicotine vape pen   Substance and Sexual Activity    Alcohol use: Yes     Alcohol/week: 1.0 standard drink of alcohol     Types: 1 Cans of beer per week    Drug use: Not Currently     Types: Marijuana    Sexual activity: Not Currently     Social  Drivers of Health     Financial Resource Strain: Medium Risk (1/2/2025)    Overall Financial Resource Strain (CARDIA)     Difficulty of Paying Living Expenses: Somewhat hard   Food Insecurity: Food Insecurity Present (1/2/2025)    Hunger Vital Sign     Worried About Running Out of Food in the Last Year: Sometimes true     Ran Out of Food in the Last Year: Sometimes true   Transportation Needs: No Transportation Needs (12/21/2024)    TRANSPORTATION NEEDS     Transportation : No   Physical Activity: Inactive (1/2/2025)    Exercise Vital Sign     Days of Exercise per Week: 2 days     Minutes of Exercise per Session: 0 min   Stress: No Stress Concern Present (1/2/2025)    [employer] [employer] - Occupational Stress Questionnaire     Feeling of Stress : Only a little   Recent Concern: Stress - Stress Concern Present (12/21/2024)    [place] - Occupational Stress Questionnaire     Feeling of Stress : Rather much   Housing Stability: High Risk (1/2/2025)    Housing Stability Vital Sign     Unable to Pay for Housing in the Last Year: Yes     Homeless in the Last Year: No

## 2025-04-04 NOTE — H&P
PRE PROCEDURE H&P    Patient Name: Jose Young  MRN: [id number]  : 1950  Date of Procedure:  2025  Referring Physician: Malorie Hupmhreys MD  Primary Physician: Malorie Madera DO  Procedure Physician: Malorie Humphreys MD       Planned Procedure: EGD  Diagnosis:   achlasia    Chief Complaint: Same as above    HPI: Patient is an 74 y.o. male is here for the above. Hx achalasia s/p POEM in , followed by Dr. Lomeli GI at [facility]. He was last seen by her 2025. Underwent EGD with Botox 2024 with symptom relief of solid and liquid food dysphagia. Esophageal manometry requested but patient has declined on numerous occasions.     Patient seen this morning. Reports positive response to EGD with Botox but states the esophageal manometry did not improve this symptoms. Educated patient that manometry evaluated the movement of the esophagus and is not therapeutic. It helps guide future therapy as well. He is no amenable to manometry. Denies blood thinners.     Last colonoscopy:   Family history: none  Anticoagulation: none    Past Medical History:   Past Medical History:   Diagnosis Date    Achalasia 2022    GERD (gastroesophageal reflux disease)     Hypertension     Kidney stones     Left renal mass     Lung nodule     Mixed hyperlipidemia     Weight loss         Past Surgical History:  Past Surgical History:   Procedure Laterality Date    COLONOSCOPY N/A 10/26/2022    Procedure: COLONOSCOPY;  Surgeon: Denver Ramos MD;  Location: [facility];  Service: Endoscopy;  Laterality: N/A;    ESOPHAGEAL MANOMETRY WITH MEASUREMENT OF IMPEDANCE N/A 2022    Procedure: MANOMETRY, ESOPHAGUS, WITH IMPEDANCE MEASUREMENT;  Surgeon: Delores Lomeli MD;  Location: [address]);  Service: Endoscopy;  Laterality: N/A;  instructions sent to [facility]    ESOPHAGOGASTRODUODENOSCOPY N/A 10/26/2022    Procedure: EGD (ESOPHAGOGASTRODUODENOSCOPY);  Surgeon: Denver Ramos MD;  Location: [facility];   Service: Endoscopy;  Laterality: N/A;  Medically Urgent  10/17 fully vaccinated; instructions to portal-st    ESOPHAGOGASTRODUODENOSCOPY N/A 11/30/2022    Procedure: EGD (ESOPHAGOGASTRODUODENOSCOPY) with musocal clip application;  Surgeon: Dianne Aguirre MD;  Location: [address];  Service: General;  Laterality: N/A;    ESOPHAGOGASTRODUODENOSCOPY N/A 12/19/2024    Procedure: EGD w/ botox;  Surgeon: Malorie Humphreys MD;  Location: [facility] ENDO;  Service: Gastroenterology;  Laterality: N/A;    MYOTOMY, PERORAL, ENDOSCOPIC N/A 11/29/2022    Procedure: MYOTOMY, PERORAL, ENDOSCOPIC POEM;  Surgeon: Dianne Aguirre MD;  Location: [address];  Service: General;  Laterality: N/A;        Home Medications:  Prior to Admission medications    Medication Sig Start Date End Date Taking? Authorizing Provider   ascorbic acid (VITAMIN C ORAL) Take by mouth.   Yes Provider, Historical   ergocalciferol, vitamin D2, (VITAMIN D ORAL) Take by mouth.   Yes Provider, Historical        Allergies:  Review of patient's allergies indicates:  No Known Allergies     Social History:   Social History[1]    Family History:  Family History   Problem Relation Name Age of Onset    Diabetes Father      Heart failure Father      Diabetes Sister      Colon cancer Neg Hx      Esophageal cancer Neg Hx         ROS: No acute cardiac events, no acute respiratory complaints.     Physical Exam (all patients):    BP (!) 150/78 (BP Location: Left arm, Patient Position: Lying)   Pulse 64   Temp 97.7 °F (36.5 °C)   Resp 17   Ht 6' (1.829 m)   Wt 67.1 kg (148 lb)   SpO2 99%   BMI 20.07 kg/m²   Lungs: Clear to auscultation bilaterally, respirations unlabored  Heart: Regular rate and rhythm, S1 and S2 normal, no obvious murmurs  Abdomen:         Soft, non-tender, bowel sounds normal, no masses, no organomegaly    Lab Results   Component Value Date    WBC 6.81 12/21/2024    MCV 87 12/21/2024    RDW 14.6 (H) 12/21/2024      2024    GLU 84 2024    BUN 12 2024     (L) 2024    K 3.9 2024     2024        SEDATION PLAN: per anesthesia      History reviewed, vital signs satisfactory, cardiopulmonary status satisfactory, sedation options, risks and plans have been discussed with the patient  All their questions were answered and the patient agrees to the sedation procedures as planned and the patient is deemed an appropriate candidate for the sedation as planned.    The risks, benefits and alternatives of the procedure were discussed with the patient in detail. This discussion was had in the presence of endoscopy staff. The risks include, risks of adverse reaction to sedation requiring the use of reversal agents, bleeding requiring blood transfusion, perforation requiring surgical intervention and technical failure. Other risks include aspiration leading to respiratory distress and respiratory failure resulting in endotracheal intubation and mechanical ventilation including death. If anesthesia is being utilized for this procedure, it is up to the anesthesiologist to determine airway safety including elective endotracheal intubation. Questions were answered, they agree to proceed. There was no language barriers.      Procedure explained to patient, informed consent obtained and placed in chart.    Malorie Humphreys  2025  11:30 AM          [1]   Social History  Socioeconomic History    Marital status: Single   Tobacco Use    Smoking status: Former     Current packs/day: 0.00     Types: Cigars, Cigarettes     Start date:      Quit date: 2022     Years since quittin.6    Smokeless tobacco: Never    Tobacco comments:     Currently uses a non-nicotine vape pen   Substance and Sexual Activity    Alcohol use: Yes     Alcohol/week: 1.0 standard drink of alcohol     Types: 1 Cans of beer per week    Drug use: Not Currently     Types: Marijuana    Sexual activity: Not Currently     Social  Drivers of Health     Financial Resource Strain: Medium Risk (1/2/2025)    Overall Financial Resource Strain (CARDIA)     Difficulty of Paying Living Expenses: Somewhat hard   Food Insecurity: Food Insecurity Present (1/2/2025)    Hunger Vital Sign     Worried About Running Out of Food in the Last Year: Sometimes true     Ran Out of Food in the Last Year: Sometimes true   Transportation Needs: No Transportation Needs (12/21/2024)    TRANSPORTATION NEEDS     Transportation : No   Physical Activity: Inactive (1/2/2025)    Exercise Vital Sign     Days of Exercise per Week: 2 days     Minutes of Exercise per Session: 0 min   Stress: No Stress Concern Present (1/2/2025)    [employer] [place] [employer] - Occupational Stress Questionnaire     Feeling of Stress : Only a little   Recent Concern: Stress - Stress Concern Present (12/21/2024)    [employer] [place] [employer] - Occupational Stress Questionnaire     Feeling of Stress : Rather much   Housing Stability: High Risk (1/2/2025)    Housing Stability Vital Sign     Unable to Pay for Housing in the Last Year: Yes     Homeless in the Last Year: No

## 2025-04-04 NOTE — ANESTHESIA POSTPROCEDURE EVALUATION
Anesthesia Post Evaluation    Patient: Jose Young    Procedure(s) Performed: * No procedures listed *    Final Anesthesia Type: MAC      Patient location during evaluation: GI PACU  Patient participation: Yes- Able to Participate  Level of consciousness: awake and alert and oriented  Post-procedure vital signs: reviewed and stable  Pain management: adequate  Airway patency: patent  JOSHUA mitigation strategies: Multimodal analgesia  PONV status at discharge: No PONV  Anesthetic complications: no      Cardiovascular status: hemodynamically stable  Respiratory status: unassisted, spontaneous ventilation and room air  Hydration status: euvolemic  Follow-up not needed.              Vitals Value Taken Time   /84 04/04/25 12:13   Temp 36.8 °C (98.2 °F) 04/04/25 11:43   Pulse 61 04/04/25 12:13   Resp 19 04/04/25 12:13   SpO2 100 % 04/04/25 12:13         Event Time   Out of Recovery 12:30:13         Pain/Anitha Score: Anitha Score: 10 (4/4/2025 12:13 PM)

## 2025-04-07 ENCOUNTER — TELEPHONE (OUTPATIENT)
Dept: ENDOSCOPY | Facility: HOSPITAL | Age: 75
End: 2025-04-07
Payer: MEDICARE

## 2025-04-07 VITALS
HEART RATE: 61 BPM | HEIGHT: 72 IN | SYSTOLIC BLOOD PRESSURE: 148 MMHG | DIASTOLIC BLOOD PRESSURE: 84 MMHG | RESPIRATION RATE: 19 BRPM | WEIGHT: 148 LBS | TEMPERATURE: 98 F | OXYGEN SATURATION: 100 % | BODY MASS INDEX: 20.05 KG/M2

## 2025-04-09 ENCOUNTER — HOSPITAL ENCOUNTER (OUTPATIENT)
Dept: ENDOSCOPY | Facility: HOSPITAL | Age: 75
Discharge: HOME | End: 2025-04-09
Attending: INTERNAL MEDICINE

## 2025-04-17 ENCOUNTER — ANESTHESIA EVENT (OUTPATIENT)
Dept: ENDOSCOPY | Facility: HOSPITAL | Age: 75
End: 2025-04-17
Payer: MEDICARE

## 2025-04-17 ENCOUNTER — ANESTHESIA (OUTPATIENT)
Dept: ENDOSCOPY | Facility: HOSPITAL | Age: 75
End: 2025-04-17
Payer: MEDICARE

## 2025-04-17 ENCOUNTER — PATIENT MESSAGE (OUTPATIENT)
Dept: GASTROENTEROLOGY | Facility: HOSPITAL | Age: 75
End: 2025-04-17
Payer: MEDICARE

## 2025-04-17 ENCOUNTER — HOSPITAL ENCOUNTER (OUTPATIENT)
Dept: ENDOSCOPY | Facility: HOSPITAL | Age: 75
Discharge: HOME | End: 2025-04-17
Attending: INTERNAL MEDICINE
Payer: MEDICARE

## 2025-04-17 VITALS
DIASTOLIC BLOOD PRESSURE: 72 MMHG | RESPIRATION RATE: 16 BRPM | SYSTOLIC BLOOD PRESSURE: 138 MMHG | BODY MASS INDEX: 20.05 KG/M2 | WEIGHT: 148 LBS | OXYGEN SATURATION: 99 % | HEART RATE: 68 BPM | TEMPERATURE: 98 F | HEIGHT: 72 IN

## 2025-04-17 DIAGNOSIS — K22.0 ACHALASIA OF ESOPHAGUS: Primary | ICD-10-CM

## 2025-04-17 PROCEDURE — 63600175 PHARM REV CODE 636 W HCPCS: Performed by: NURSE ANESTHETIST, CERTIFIED REGISTERED

## 2025-04-17 RX ORDER — LIDOCAINE HYDROCHLORIDE 20 MG/ML
INJECTION INTRAVENOUS
Status: DISCONTINUED | OUTPATIENT
Start: 2025-04-17 | End: 2025-04-17

## 2025-04-17 RX ORDER — PROPOFOL 10 MG/ML
VIAL (ML) INTRAVENOUS
Status: DISCONTINUED | OUTPATIENT
Start: 2025-04-17 | End: 2025-04-17

## 2025-04-17 RX ADMIN — PROPOFOL 90 MG: 10 INJECTION, EMULSION INTRAVENOUS at 09:04

## 2025-04-17 RX ADMIN — LIDOCAINE HYDROCHLORIDE 100 MG: 20 INJECTION INTRAVENOUS at 09:04

## 2025-04-17 RX ADMIN — PROPOFOL 30 MG: 10 INJECTION, EMULSION INTRAVENOUS at 10:04

## 2025-04-17 NOTE — TRANSFER OF CARE
Anesthesia Transfer of Care Note    Patient: Jose Young    Procedure(s) Performed: * No procedures listed *    Patient location: PACU    Anesthesia Type: MAC    Transport from OR: Transported from OR on room air with adequate spontaneous ventilation    Post pain: adequate analgesia    Post assessment: no apparent anesthetic complications    Post vital signs: stable    Level of consciousness: awake and alert    Nausea/Vomiting: no nausea/vomiting    Complications: none    Transfer of care protocol was followed      Last vitals: Visit Vitals  BP (!) 159/89 (BP Location: Left arm, Patient Position: Lying)   Pulse 83   Temp 37 °C (98.6 °F) (Temporal)   Resp 18   Ht 6' (1.829 m)   Wt 67.1 kg (148 lb)   SpO2 99%   BMI 20.07 kg/m²

## 2025-04-17 NOTE — ANESTHESIA PREPROCEDURE EVALUATION
04/17/2025  Jose Young is a 74 y.o., male.      Pre-op Assessment    I have reviewed the Patient Summary Reports.     I have reviewed the Nursing Notes. I have reviewed the NPO Status.   I have reviewed the Medications.     Review of Systems  Anesthesia Hx:  No problems with previous Anesthesia                Social:  No Alcohol Use, Smoker       Hematology/Oncology:  Hematology Normal   Oncology Normal                                   EENT/Dental:  EENT/Dental Normal           Cardiovascular:     Hypertension, well controlled Valvular problems/Murmurs, AS  CAD           hyperlipidemia                               Pulmonary:   COPD, moderate                     Renal/:  Chronic Renal Disease, CKD                Hepatic/GI:     GERD, poorly controlled        Esophageal / Stomach Disorders Gerd (Achalasia, esophageal)          Musculoskeletal:  Musculoskeletal Normal                Neurological:  Neurology Normal                                      Endocrine:  Endocrine Normal            Dermatological:  Skin Normal    Psych:  Psychiatric Normal                    Physical Exam  General: Well nourished    Airway:  Mallampati: II   Mouth Opening: Normal  TM Distance: Normal  Tongue: Normal  Neck ROM: Normal ROM    Dental:  Intact    Chest/Lungs:  Clear to auscultation    Heart:  Rate: Normal        Anesthesia Plan  Type of Anesthesia, risks & benefits discussed:    Anesthesia Type: MAC  Intra-op Monitoring Plan: Standard ASA Monitors  Induction:  IV  Informed Consent: Informed consent signed with the Patient and all parties understand the risks and agree with anesthesia plan.  All questions answered. Patient consented to blood products? Yes  ASA Score: 3    Ready For Surgery From Anesthesia Perspective.     .

## 2025-04-17 NOTE — INTERVAL H&P NOTE
The patient has been examined and the H&P has been reviewed:    I concur with the findings and no changes have occurred since H&P was written.        Active Hospital Problems    Diagnosis  POA    *Achalasia, esophageal [K22.0]  Yes     Priority: 1 - High      Resolved Hospital Problems   No resolved problems to display.

## 2025-04-17 NOTE — BRIEF OP NOTE
Endoscopy Discharge Summary      Admit Date: 4/17/2025    Discharge Date and Time:  4/17/2025 10:20 AM    Attending Physician: Des Swain     Discharge Physician: Des Swain     Principal Admitting Diagnoses: Achalasia, esophageal         Discharge Diagnosis: The encounter diagnosis was Achalasia of esophagus.     Discharged Condition: Good    Indication for Admission: Achalasia, esophageal     Hospital Course: Patient was admitted for an inpatient procedure and tolerated the procedure well with no complications.    Significant Diagnostic Studies: EGD  The EGD was initiated with the patient under appropriate sedation. Upon introduction of the endoscope into the esophagus, a significant amount of retained food was observed throughout the esophagus. The volume and distribution of the food made it impossible to clear the esophagus adequately, despite attempts at suctioning and lavage. Due to the extensive esophageal content, safe visualization of the esophageal mucosa was not achievable, and further advancement of the endoscope was not pursued.  Given these limitations, the procedure was aborted, and Botox injections could not be administered.  Plan:  The patient will be advised on dietary modifications, including a clear liquid or soft diet prior to any future procedures.  Consideration for pre-procedural esophageal clearance with a nasogastric tube or additional measures to minimize esophageal contents.  Reschedule EGD with the goal of proceeding with Botox injections after addressing the current challenges.    Pathology (if any):  Specimen (24h ago, onward)      None            Disposition: Home.    Bleeding: None    No Implants         Follow Up/Patient Instructions:   Patient's Medications   New Prescriptions    No medications on file   Previous Medications    ASCORBIC ACID (VITAMIN C ORAL)    Take by mouth.    ERGOCALCIFEROL, VITAMIN D2, (VITAMIN D ORAL)    Take by mouth.   Modified Medications    No  medications on file   Discontinued Medications    No medications on file       Discharge Procedure Orders   Diet general     No dressing needed     Call MD for:  temperature >100.4     Call MD for:  persistent nausea and vomiting     Call MD for:  severe uncontrolled pain     Call MD for:  difficulty breathing, headache or visual disturbances     Call MD for:  redness, tenderness, or signs of infection (pain, swelling, redness, odor or green/yellow discharge around incision site)     Call MD for:  hives     Call MD for:  persistent dizziness or light-headedness        Follow-up Information       Malorie Madera, DO Follow up.    Specialty: Internal Medicine  Why: As needed  Contact information:  [address] [place] [address] [address] [place] [address] [phone]

## 2025-04-17 NOTE — DISCHARGE INSTRUCTIONS
04/17/2025    Dear Jose Young,     Below are important post-procedure care instructions to help ensure a smooth recovery.    General Post-Procedure Care    ? Discharge: You have been discharged home in stable condition.  ? Supervision: A responsible adult should stay with you for the next 12-24 hours.  ? Activity Restrictions:    You may feel drowsy due to sedation; avoid driving, operating heavy machinery, making important decisions, or drinking alcohol for the rest of the day.  Resume normal activities tomorrow unless otherwise directed by your doctor.  Diet & Hydration    ? After Upper Endoscopy (EGD):    Start with clear liquids and soft foods. You may gradually return to your normal diet as tolerated.  Avoid hot, spicy, or acidic foods for the rest of the day to prevent throat irritation.    ? After Colonoscopy:    Begin with a light meal and plenty of fluids.  Avoid heavy, greasy, or spicy foods for the first 24 hours to minimize stomach upset.  Resume fiber intake gradually to avoid bloating or discomfort.  Common Post-Procedure Symptoms    It is normal to experience:  ? Mild sore throat or hoarseness (should improve within 24 hours).  ? Bloating, gas, or mild cramping due to air introduced during the colonoscopy.  ? Some fatigue or grogginess from sedation.  ? A small amount of rectal bleeding (especially if polyps were removed).    Warning Signs - When to Call    Please seek immediate medical attention or call [insert emergency contact number] if you experience:  ?? Severe or persistent chest or abdominal pain.  ?? Difficulty breathing or swallowing.  ?? Vomiting blood or passing large amounts of blood in stool.  ?? Fever over 101°F or signs of infection.  ?? Severe dizziness or fainting.  ?? Persistent nausea or vomiting.    Follow-Up & Results    ?? If biopsies were taken or polyps removed, we will contact you with results via [email or web address] or via phone call.  ?? If you have any questions or  "concerns, please contact our office at [phone].    We recommend the following:  Psyllium husk daily.  Magnesium Glycinate daily.  Ground Flaxseed daily.  Probiotics (Florastor or align) daily.     We genuinely value your feedback and believe that it plays a crucial role in our pursuit of excellence. We kindly request you to take a few minutes of your time to share your experience with us by posting a [employer] review. Your honest thoughts and opinions can make a significant difference for others seeking healthcare services and will help us better understand how we can further enhance our patient care.    Thank you for trusting us with your care,          Des Swain M.D.  click on the link for contact information.           At [facility] we offer virtual visits. Please use your [facility] nevin to schedule a virtual visit.     To rate your experience with Dr. Swain, click on this link    To post a review, simply follow these quick steps:  1. Visit [employer] or search for my name on Google.  2. Click on the "Write a review" button on the left-hand side of the page.  3. Rate your experience by choosing the number of stars that reflect your satisfaction.  4. Share your thoughts and insights about your visit - we'd love to hear your feedback!        "

## 2025-04-17 NOTE — ANESTHESIA POSTPROCEDURE EVALUATION
Anesthesia Post Evaluation    Patient: Jose Young    Procedure(s) Performed: * No procedures listed *    Final Anesthesia Type: MAC      Patient location during evaluation: PACU  Patient participation: Yes- Able to Participate  Level of consciousness: awake and alert and awake  Post-procedure vital signs: reviewed and stable  Pain management: adequate  Airway patency: patent  JOSHUA mitigation strategies: Multimodal analgesia  PONV status at discharge: No PONV  Anesthetic complications: no      Cardiovascular status: blood pressure returned to baseline  Respiratory status: spontaneous ventilation and room air  Hydration status: euvolemic  Follow-up not needed.              Vitals Value Taken Time   /66 04/17/25 10:15   Temp 98 04/17/25 10:15   Pulse 66 04/17/25 10:15   Resp 12 04/17/25 10:15   SpO2 98 04/17/25 10:15         No case tracking events are documented in the log.      Pain/Anitha Score: No data recorded

## 2025-04-24 ENCOUNTER — TELEPHONE (OUTPATIENT)
Dept: ENDOSCOPY | Facility: HOSPITAL | Age: 75
End: 2025-04-24
Payer: MEDICARE

## 2025-05-01 ENCOUNTER — HOSPITAL ENCOUNTER (OUTPATIENT)
Dept: ENDOSCOPY | Facility: HOSPITAL | Age: 75
Discharge: HOME | End: 2025-05-01
Attending: INTERNAL MEDICINE
Payer: MEDICARE

## 2025-05-01 VITALS
HEART RATE: 83 BPM | SYSTOLIC BLOOD PRESSURE: 132 MMHG | DIASTOLIC BLOOD PRESSURE: 74 MMHG | TEMPERATURE: 98 F | OXYGEN SATURATION: 98 % | RESPIRATION RATE: 18 BRPM

## 2025-05-01 DIAGNOSIS — K22.0 ACHALASIA OF ESOPHAGUS: ICD-10-CM

## 2025-05-01 PROCEDURE — 25000003 PHARM REV CODE 250: Performed by: INTERNAL MEDICINE

## 2025-05-01 RX ORDER — LIDOCAINE HYDROCHLORIDE 20 MG/ML
2 SOLUTION OROPHARYNGEAL ONCE
Status: COMPLETED | OUTPATIENT
Start: 2025-05-01 | End: 2025-05-01

## 2025-05-01 RX ADMIN — LIDOCAINE HYDROCHLORIDE 2 ML: 20 SOLUTION ORAL at 11:05

## 2025-05-03 ENCOUNTER — TELEPHONE (OUTPATIENT)
Dept: ENDOSCOPY | Facility: HOSPITAL | Age: 75
End: 2025-05-03
Payer: MEDICARE

## 2025-05-03 NOTE — TELEPHONE ENCOUNTER
----- Message from Delores Lomeli MD sent at 5/2/2025  4:11 PM CDT -----  Regarding: EGD with Endoflip and manometry catheter placement during the EGD  MOTILITY CLINIC PROCEDURE ORDERSCLEARANCE FOR PROCEDURES:[ ] Not needed PROCEDURES[ ] EGD with EndoFlip [ ] Esophageal manometry with impedance - dysphagia Does manometry need to be placed endoscopically: YESFLOOR:[ ] 2nd FloorReason for 2nd Floor: AchalasiaPREP[ ] Full liquid diet 3 days MEDICATIONSMotility Studies (esophageal manometry/anorectal manometry)Hold narcotics x 1 days if able Hold TCA x 1 days if ablePropofol/lidocaine only during sedation.  Discuss with Dr. Lomeli if additional sedation needed. Hold baclofen for thee days (at least one day) if able Hold muscle relaxants x 1 day if able Anticoagulation/antiplt agents: NoORDER OF TESTING:Day 1: EGD with Endoflip and Manometry (catheter placed during EGD)Day 2:Day 3:Day 4:  week after: [ ] [facility]URGENCY [x] Medically NOT Urgent  DIAGNOSIS Achalasia   PHYSICIAN[ ]  Ok with Dr. Lomeli

## 2025-05-05 ENCOUNTER — TELEPHONE (OUTPATIENT)
Dept: ENDOSCOPY | Facility: HOSPITAL | Age: 75
End: 2025-05-05
Payer: MEDICARE

## 2025-05-05 VITALS — WEIGHT: 148 LBS | HEIGHT: 72 IN | BODY MASS INDEX: 20.05 KG/M2

## 2025-05-05 DIAGNOSIS — K22.0 ACHALASIA: ICD-10-CM

## 2025-05-05 DIAGNOSIS — R13.10 DYSPHAGIA, UNSPECIFIED TYPE: Primary | ICD-10-CM

## 2025-05-05 NOTE — TELEPHONE ENCOUNTER
----- Message from Delores Lomeli MD sent at 5/2/2025  4:11 PM CDT -----    Regarding: EGD with Endoflip and manometry catheter placement during the EGD    MOTILITY CLINIC PROCEDURE ORDERS  CLEARANCE FOR PROCEDURES:[ ] Not needed   PROCEDURES[ ] EGD with EndoFlip [ ] Esophageal manometry with impedance - dysphagia   Does manometry need to be placed endoscopically: YES  FLOOR:[ ] 2nd FloorReason for 2nd Floor: Achalasia  PREP[ ] Full liquid diet 3 days   MEDICATIONS Motility Studies (esophageal manometry/anorectal manometry)  Hold narcotics x 1 days if able Hold TCA x 1 days if able  Propofol/lidocaine only during sedation.  Discuss with Dr. Lomeli if additional sedation needed. Hold baclofen for thee days (at least one day) if able.  Hold muscle relaxants x 1 day if able   Anticoagulation/antiplt agents: No  ORDER OF TESTING:  Day 1: EGD with Endoflip and Manometry (catheter placed during EGD)  Day 2:  Day 3:  Day 4:    week after:   [ ] Patient lives in [place]  SCHEDULING PRIORITY Next available (as soon as you can)  URGENCY [x] Medically NOT Urgent   DIAGNOSIS Achalasia     PHYSICIAN[ ]  Ok with Dr. Lomeli

## 2025-05-05 NOTE — TELEPHONE ENCOUNTER
Referral for procedure from [facility]    Spoke to Jose Young to schedule EGD/Endoflip/Esophageal Manometry       Physician to perform procedure(s) Dr. ALBANIA Lomeli    Date of Procedure (s) 5/21/25  Arrival Time 7:15 AM  Time of Procedure(s) 8:15 AM   Location of Procedure(s) [address]  Instructions provided to patient via [facility]  Patient denies use of blood thinners, GLP-1 medications, and weight loss medications.  The following information was discussed with patient, and patient verbalized understanding:  Screening questionnaire reviewed with patient and complete. If procedure requires anesthesia, a responsible adult needs to be present to accompany the patient home. Appointment details are tentative, especially check-in time. Patient will receive a pre-op call 7 days prior to appointment to confirm check-in time for procedure. Patient was advised to call the endoscopy scheduling department if any questions or concerns arise.       Endoscopy Scheduling Department

## 2025-05-20 ENCOUNTER — ANESTHESIA EVENT (OUTPATIENT)
Dept: ENDOSCOPY | Facility: HOSPITAL | Age: 75
End: 2025-05-20
Payer: MEDICARE

## 2025-05-20 NOTE — ANESTHESIA PREPROCEDURE EVALUATION
05/20/2025  Jose Young is a 74 y.o., male.    Pre-operative evaluation for Procedure(s) (LRB):  EGD (ESOPHAGOGASTRODUODENOSCOPY) (N/A)  ESOPHAGEAL BALLOON PROVOCATION STUDY (N/A)  MANOMETRY, ESOPHAGUS, WITH IMPEDANCE MEASUREMENT (N/A)    Jose Young is a 74 y.o. male with PMH of achalasia, CKD, HTN here for above procedure       Prev airway:   Date/Time: 11/29/2022 12:00 PM  Performed by: Yosef Willis DO  Authorized by: Tatum Blank MD      Intubation:     Induction:  Rapid sequence induction    Intubated:  Postinduction    Mask Ventilation:  Not attempted    Attempts:  1    Attempted By:  Resident anesthesiologist    Method of Intubation:  Direct    Blade:  Sanders 2    Laryngeal View Grade: Grade I - full view of cords      Difficult Airway Encountered?: No      Complications:  Reflex of gastric contents - possible aspiration    Airway Device:  Oral endotracheal tube    Airway Device Size:  7.5    Style/Cuff Inflation:  Cuffed (inflated to minimal occlusive pressure)    Inflation Amount (mL):  7    Tube secured:  23    Secured at:  The lips    Placement Verified By:  Capnometry    Complicating Factors:  None    Findings Post-Intubation:  BS equal bilateral and atraumatic/condition of teeth unchanged  Notes:      Patient with reflux of gastric contents upon direct laryngoscopy. Gastric contents immediately suctioned and additional paralytic given. Laryngoscope reintroduced and patient intubated successfully. Diminished breath sounds bilaterally.     2D Echo: none on record      EKG:   Vent. Rate :  71 BPM     Atrial Rate :  71 BPM      P-R Int : 130 ms          QRS Dur :  80 ms       QT Int : 382 ms       P-R-T Axes :  83  83  78 degrees     QTcB Int : 415 ms     Normal sinus rhythm   Right atrial enlargement   Septal infarct (cited on or before 29-Nov-2022)   Abnormal ECG   When compared with  "ECG of 29-Nov-2022 18:00,   T wave amplitude has increased in Inferior leads   Confirmed by Yash Queen ([id number]) on 12/22/2024 5:43:25 PM         Problem List[1]    Review of patient's allergies indicates:  No Known Allergies    Past Surgical History:   Procedure Laterality Date    COLONOSCOPY N/A 10/26/2022    Procedure: COLONOSCOPY;  Surgeon: Denver Ramos MD;  Location: [facility];  Service: Endoscopy;  Laterality: N/A;    ESOPHAGEAL MANOMETRY WITH MEASUREMENT OF IMPEDANCE N/A 11/07/2022    Procedure: MANOMETRY, ESOPHAGUS, WITH IMPEDANCE MEASUREMENT;  Surgeon: Delores Lomeli MD;  Location: [facility] (4TH FLR);  Service: Endoscopy;  Laterality: N/A;  instructions sent to [facility]    ESOPHAGOGASTRODUODENOSCOPY N/A 10/26/2022    Procedure: EGD (ESOPHAGOGASTRODUODENOSCOPY);  Surgeon: Denver Ramos MD;  Location: [facility];  Service: Endoscopy;  Laterality: N/A;  Medically Urgent  10/17 fully vaccinated; instructions to portal-st    ESOPHAGOGASTRODUODENOSCOPY N/A 11/30/2022    Procedure: EGD (ESOPHAGOGASTRODUODENOSCOPY) with musocal clip application;  Surgeon: Dianne Aguirre MD;  Location: [facility] OR [address] [place] [address];  Service: General;  Laterality: N/A;    ESOPHAGOGASTRODUODENOSCOPY N/A 12/19/2024    Procedure: EGD w/ botox;  Surgeon: Malorie Humphreys MD;  Location: [facility];  Service: Gastroenterology;  Laterality: N/A;    MYOTOMY, PERORAL, ENDOSCOPIC N/A 11/29/2022    Procedure: MYOTOMY, PERORAL, ENDOSCOPIC POEM;  Surgeon: Dianne Aguirre MD;  Location: [facility] OR [address] [place] [address];  Service: General;  Laterality: N/A;         Vital Signs:         CBC: No results for input(s): "WBC", "RBC", "HGB", "HCT", "PLT", "MCV", "MCH", "MCHC" in the last 72 hours.    CMP: No results for input(s): "NA", "K", "CL", "CO2", "BUN", "CREATININE", "GLU", "MG", "PHOS", "CALCIUM", "ALBUMIN", "PROT", "ALKPHOS", "ALT", "AST", "BILITOT" in the last 72 hours.    INR  No results for input(s): "PT", "INR", "PROTIME", "APTT" in the last 72 " hours.                Pre-op Assessment    I have reviewed the Patient Summary Reports.     I have reviewed the Nursing Notes. I have reviewed the NPO Status.   I have reviewed the Medications.     Review of Systems  Anesthesia Hx:   History of prior surgery of interest to airway management or planning:  Previous anesthesia: MAC        Denies Family Hx of Anesthesia complications.    Denies Personal Hx of Anesthesia complications.                    Cardiovascular:     Hypertension   CAD                    Coronary Artery Disease:                            Hypertension         Pulmonary:   COPD         Chronic Obstructive Pulmonary Disease (COPD):                      Renal/:  Chronic Renal Disease        Kidney Function/Disease             Hepatic/GI:     GERD         Gerd                 Anesthesia Plan  Type of Anesthesia, risks & benefits discussed:    Anesthesia Type: Gen ETT, MAC  Intra-op Monitoring Plan: Standard ASA Monitors  Post Op Pain Control Plan: multimodal analgesia  Induction:  IV  Airway Plan: Video, Post-Induction  ASA Score: 3  Day of Surgery Review of History & Physical: H&P Update referred to the surgeon/provider.    Ready For Surgery From Anesthesia Perspective.     .           [1]   Patient Active Problem List  Diagnosis    Cigar smoker motivated to quit    Elevated PSA    Hyperlipidemia, mixed    Hypertension, essential    Achalasia, esophageal    Left renal mass    Pulmonary nodule    Nephrolithiasis    Atherosclerosis of aorta    Coronary atherosclerosis    Emphysema lung    Dysphagia    MALLY (acute kidney injury)    Weight loss    Severe protein-calorie malnutrition

## 2025-05-21 ENCOUNTER — HOSPITAL ENCOUNTER (OUTPATIENT)
Facility: HOSPITAL | Age: 75
Discharge: HOME | End: 2025-05-21
Attending: INTERNAL MEDICINE | Admitting: INTERNAL MEDICINE
Payer: MEDICARE

## 2025-05-21 ENCOUNTER — ANESTHESIA (OUTPATIENT)
Dept: ENDOSCOPY | Facility: HOSPITAL | Age: 75
End: 2025-05-21
Payer: MEDICARE

## 2025-05-21 VITALS
HEART RATE: 67 BPM | DIASTOLIC BLOOD PRESSURE: 78 MMHG | SYSTOLIC BLOOD PRESSURE: 142 MMHG | RESPIRATION RATE: 20 BRPM | TEMPERATURE: 98 F | OXYGEN SATURATION: 97 %

## 2025-05-21 DIAGNOSIS — K22.0 ACHALASIA: ICD-10-CM

## 2025-05-21 DIAGNOSIS — K22.0 ACHALASIA, ESOPHAGEAL: Primary | ICD-10-CM

## 2025-05-21 PROCEDURE — 43235 EGD DIAGNOSTIC BRUSH WASH: CPT | Performed by: INTERNAL MEDICINE

## 2025-05-21 PROCEDURE — 63600175 PHARM REV CODE 636 W HCPCS: Performed by: NURSE ANESTHETIST, CERTIFIED REGISTERED

## 2025-05-21 PROCEDURE — 25000003 PHARM REV CODE 250: Performed by: NURSE ANESTHETIST, CERTIFIED REGISTERED

## 2025-05-21 PROCEDURE — 25000003 PHARM REV CODE 250: Performed by: INTERNAL MEDICINE

## 2025-05-21 PROCEDURE — C1726 CATH, BAL DIL, NON-VASCULAR: HCPCS | Performed by: INTERNAL MEDICINE

## 2025-05-21 PROCEDURE — 91040 ESOPH BALLOON DISTENSION TST: CPT | Mod: TC | Performed by: INTERNAL MEDICINE

## 2025-05-21 PROCEDURE — 37000008 HC ANESTHESIA 1ST 15 MINUTES: Performed by: INTERNAL MEDICINE

## 2025-05-21 PROCEDURE — 91040 ESOPH BALLOON DISTENSION TST: CPT | Mod: 26,,, | Performed by: INTERNAL MEDICINE

## 2025-05-21 PROCEDURE — 43235 EGD DIAGNOSTIC BRUSH WASH: CPT | Mod: ,,, | Performed by: INTERNAL MEDICINE

## 2025-05-21 PROCEDURE — 27201012 HC FORCEPS, HOT/COLD, DISP: Performed by: INTERNAL MEDICINE

## 2025-05-21 PROCEDURE — 37000009 HC ANESTHESIA EA ADD 15 MINS: Performed by: INTERNAL MEDICINE

## 2025-05-21 RX ORDER — GLUCAGON 1 MG
1 KIT INJECTION
OUTPATIENT
Start: 2025-05-21

## 2025-05-21 RX ORDER — SODIUM CHLORIDE 0.9 % (FLUSH) 0.9 %
10 SYRINGE (ML) INJECTION
OUTPATIENT
Start: 2025-05-21

## 2025-05-21 RX ORDER — LIDOCAINE HYDROCHLORIDE 20 MG/ML
JELLY TOPICAL ONCE
Status: COMPLETED | OUTPATIENT
Start: 2025-05-21 | End: 2025-05-21

## 2025-05-21 RX ORDER — PHENYLEPHRINE HYDROCHLORIDE 10 MG/ML
INJECTION INTRAVENOUS
Status: DISCONTINUED | OUTPATIENT
Start: 2025-05-21 | End: 2025-05-21

## 2025-05-21 RX ORDER — SODIUM CHLORIDE 9 MG/ML
INJECTION, SOLUTION INTRAVENOUS CONTINUOUS PRN
Status: DISCONTINUED | OUTPATIENT
Start: 2025-05-21 | End: 2025-05-21

## 2025-05-21 RX ORDER — PROPOFOL 10 MG/ML
VIAL (ML) INTRAVENOUS
Status: DISCONTINUED | OUTPATIENT
Start: 2025-05-21 | End: 2025-05-21

## 2025-05-21 RX ORDER — LIDOCAINE HYDROCHLORIDE 20 MG/ML
INJECTION INTRAVENOUS
Status: DISCONTINUED | OUTPATIENT
Start: 2025-05-21 | End: 2025-05-21

## 2025-05-21 RX ORDER — ROCURONIUM BROMIDE 10 MG/ML
INJECTION, SOLUTION INTRAVENOUS
Status: DISCONTINUED | OUTPATIENT
Start: 2025-05-21 | End: 2025-05-21

## 2025-05-21 RX ORDER — SODIUM CHLORIDE 9 MG/ML
INJECTION, SOLUTION INTRAVENOUS CONTINUOUS
Status: DISCONTINUED | OUTPATIENT
Start: 2025-05-21 | End: 2025-05-21 | Stop reason: HOSPADM

## 2025-05-21 RX ADMIN — SODIUM CHLORIDE: 0.9 INJECTION, SOLUTION INTRAVENOUS at 08:05

## 2025-05-21 RX ADMIN — PHENYLEPHRINE HYDROCHLORIDE 100 MCG: 10 INJECTION INTRAVENOUS at 08:05

## 2025-05-21 RX ADMIN — LIDOCAINE HYDROCHLORIDE 10 ML: 20 JELLY TOPICAL at 08:05

## 2025-05-21 RX ADMIN — PROPOFOL 50 MG: 10 INJECTION, EMULSION INTRAVENOUS at 09:05

## 2025-05-21 RX ADMIN — PROPOFOL 300 MG: 10 INJECTION, EMULSION INTRAVENOUS at 08:05

## 2025-05-21 RX ADMIN — ROCURONIUM BROMIDE 30 MG: 10 INJECTION, SOLUTION INTRAVENOUS at 08:05

## 2025-05-21 RX ADMIN — SUGAMMADEX 200 MG: 100 INJECTION, SOLUTION INTRAVENOUS at 09:05

## 2025-05-21 RX ADMIN — LIDOCAINE HYDROCHLORIDE 100 MG: 20 INJECTION INTRAVENOUS at 08:05

## 2025-05-21 NOTE — PLAN OF CARE
Patient transferring via stretcher to [facility]. Jose is transporting patient. Family at the bedside.

## 2025-05-21 NOTE — TRANSFER OF CARE
Anesthesia Transfer of Care Note    Patient: Jose Young    Procedure(s) Performed: Procedure(s) (LRB):  EGD (ESOPHAGOGASTRODUODENOSCOPY) (N/A)  ESOPHAGEAL BALLOON PROVOCATION STUDY (N/A)  MANOMETRY, ESOPHAGUS, WITH IMPEDANCE MEASUREMENT (N/A)    Patient location: [facility]    Anesthesia Type: general    Transport from OR: Transported from OR on 6-10 L/min O2 by face mask with adequate spontaneous ventilation    Post pain: adequate analgesia    Post assessment: no apparent anesthetic complications and tolerated procedure well    Post vital signs: stable    Level of consciousness: sedated    Nausea/Vomiting: no nausea/vomiting    Complications: none    Transfer of care protocol was followed      Last vitals: Visit Vitals  BP (!) 149/78 (BP Location: Left arm, Patient Position: Lying)   Pulse 73   Temp 36.6 °C (97.9 °F)   Resp 16   SpO2 100%

## 2025-05-21 NOTE — PROVATION PATIENT INSTRUCTIONS
Discharge Summary/Instructions after an Endoscopic Procedure  Patient Name: Jose Young  Patient MRN: [id number]  Patient YOB: 1950  Wednesday, May 21, 2025  Delores Lomeli MD  Dear patient,  As a result of recent federal legislation (The Federal Cures Act), you may   receive lab or pathology results from your procedure in your [facility]   account before your physician is able to contact you. Your physician or   their representative will relay the results to you with their   recommendations at their soonest availability.  Thank you,  RESTRICTIONS:  During your procedure today, you received medications for sedation.  These   medications may affect your judgment, balance and coordination.  Therefore,   for 24 hours, you have the following restrictions:   - DO NOT drive a car, operate machinery, make legal/financial decisions,   sign important papers or drink alcohol.    ACTIVITY:  Today: no heavy lifting, straining or running due to procedural   sedation/anesthesia.  The following day: return to full activity including work.  DIET:  Eat and drink normally unless instructed otherwise.     TREATMENT FOR COMMON SIDE EFFECTS:  - Mild abdominal pain, nausea, belching, bloating or excessive gas:  rest,   eat lightly and use a heating pad.  - Sore Throat: treat with throat lozenges and/or gargle with warm salt   water.  - Because air was used during the procedure, expelling large amounts of air   from your rectum or belching is normal.  - If a bowel prep was taken, you may not have a bowel movement for 1-3 days.    This is normal.  SYMPTOMS TO WATCH FOR AND REPORT TO YOUR PHYSICIAN:  1. Abdominal pain or bloating, other than gas cramps.  2. Chest pain.  3. Back pain.  4. Signs of infection such as: chills or fever occurring within 24 hours   after the procedure.  5. Rectal bleeding, which would show as bright red, maroon, or black stools.   (A tablespoon of blood from the rectum is not serious, especially  if   hemorrhoids are present.)  6. Vomiting.  7. Weakness or dizziness.  GO DIRECTLY TO THE NEAREST EMERGENCY ROOM IF YOU HAVE ANY OF THE FOLLOWING:      Difficulty breathing              Chills and/or fever over 101 F   Persistent vomiting and/or vomiting blood   Severe abdominal pain   Severe chest pain   Black, tarry stools   Bleeding- more than one tablespoon   Any other symptom or condition that you feel may need urgent attention  Your doctor recommends these additional instructions:  If any biopsies were taken, your doctors clinic will contact you in 1 to 2   weeks with any results.  - Proceed to esophageal manometry then discharge patient to home.   - Resume previous diet.   - Continue present medications.  For questions, problems or results please call your physician - Delores Lomeli MD at Work:  [phone].  [facility], EMERGENCY ROOM PHONE NUMBER: [phone]  IF A COMPLICATION OR EMERGENCY SITUATION ARISES AND YOU ARE UNABLE TO REACH   YOUR PHYSICIAN - GO DIRECTLY TO THE EMERGENCY ROOM.  Delores Lomeli MD  5/21/2025 9:28:51 AM  This report has been verified and signed electronically.  Dear patient,  As a result of recent federal legislation (The Federal Cures Act), you may   receive lab or pathology results from your procedure in your [facility]   account before your physician is able to contact you. Your physician or   their representative will relay the results to you with their   recommendations at their soonest availability.  Thank you,  PROVATION

## 2025-05-21 NOTE — ANESTHESIA PROCEDURE NOTES
Intubation    Date/Time: 5/21/2025 8:19 AM    Performed by: Kena Boone CRNA  Authorized by: Anju Garcia MD    Intubation:     Induction:  Rapid sequence induction    Intubated:  N/a    Mask Ventilation:  Not attempted    Attempts:  1    Attempted By:  CRNA    Method of Intubation:  Video laryngoscopy    Blade:  Mixon 3    Laryngeal View Grade: Grade I - full view of cords      Difficult Airway Encountered?: No      Complications:  Reflux of gastric contents - no apparent aspiration    Airway Device:  Oral endotracheal tube    Airway Device Size:  7.5    Tube secured:  22    Secured at:  The lips    Placement Verified By:  Capnometry    Complicating Factors:  None    Findings Post-Intubation:  BS equal bilateral and atraumatic/condition of teeth unchanged

## 2025-05-21 NOTE — OR NURSING
From [facility] recovery per stretcher to Endo 4th floor room 9 for esophageal manometry. Catheter adjusted to center Image on high resolution screen. Placement confirmed with deep breath, PIP confirmed. Tolerated procedure well. Reports  expected, moderate,. Nasal and throat irritation / discomfort during procedure,after catheter removal, and at time of discharge.AVS and discharge instructions reviewed and understood. Daughter with patient for discharge (Dianne).

## 2025-05-21 NOTE — H&P
Short Stay Endoscopy History and Physical    PCP - Malorie Madera DO     Procedure - EGD with Endoflip and manometry  ASA - per anesthesia  Mallampati - per anesthesia  History of Anesthesia problems - no  Family history Anesthesia problems -  no   Plan of anesthesia - General    HPI:  This is a 74 y.o. male here for evaluation of :     achalasia    ROS:  Constitutional: No fevers, chills, No weight loss  CV: No chest pain  Pulm: No cough, No shortness of breath  Ophtho: No vision changes  GI: see HPI  Derm: No rash    Medical History:  has a past medical history of Achalasia (11/07/2022), GERD (gastroesophageal reflux disease), Hypertension, Kidney stones, Left renal mass, Lung nodule, Mixed hyperlipidemia, and Weight loss.    Surgical History:  has a past surgical history that includes Esophagogastroduodenoscopy (N/A, 10/26/2022); Colonoscopy (N/A, 10/26/2022); Esophageal manometry with measurement of impedance (N/A, 11/07/2022); Esophagogastroduodenoscopy (N/A, 11/30/2022); myotomy, peroral, endoscopic (N/A, 11/29/2022); and Esophagogastroduodenoscopy (N/A, 12/19/2024).    Family History: family history includes Diabetes in his father and sister; Heart failure in his father.. Otherwise no colon cancer, inflammatory bowel disease, or GI malignancies.    Social History:  reports that he quit smoking about 2 years ago. His smoking use included cigars and cigarettes. He started smoking about 7 years ago. He has never used smokeless tobacco. He reports current alcohol use of about 1.0 standard drink of alcohol per week. He reports that he does not currently use drugs after having used the following drugs: Marijuana.    Review of patient's allergies indicates:  No Known Allergies    Medications:   Prescriptions Prior to Admission[1]    Physical Exam:    Vital Signs:   Vitals:    05/21/25 0732   BP: (!) 149/78   Pulse: 73   Resp: 16   Temp: 97.9 °F (36.6 °C)       Gen: NAD, lying comfortably  HENT: NCAT, oropharynx  clear  Eyes: anicteric sclerae, EOMI grossly  Neck: supple, no visible masses/goiter  Cardiac: RRR  Lungs: non-labored breathing  Abd: soft, NT/ND, normoactive BS  Ext: no LE edema, warm, well perfused  Skin: skin intact on exposed body parts, no visible rashes, lesions  Neuro: A&Ox4, neuro exam grossly intact, moves all extremities  Psych: appropriate mood, affect      Labs:  Lab Results   Component Value Date    WBC 6.81 12/21/2024    HGB 11.3 (L) 12/21/2024    HCT 33.4 (L) 12/21/2024     12/21/2024    CHOL 245 (H) 08/07/2020    TRIG 50 12/12/2022    HDL 48 08/07/2020    ALT 17 12/21/2024    AST 19 12/21/2024     (L) 12/21/2024    K 3.9 12/21/2024     12/21/2024    CREATININE 0.8 12/21/2024    BUN 12 12/21/2024    CO2 23 12/21/2024    TSH 1.699 10/14/2022    PSA 5.3 (H) 08/07/2020       Plan:  EGD with Endoflip and manometry for achalasia    I have explained the risks and benefits of endoscopy procedures to the patient including but not limited to bleeding, perforation, infection, and death.  The patient was asked if they understand and allowed to ask any further questions to their satisfaction.      Delores Lomeli MD         [1]   Medications Prior to Admission   Medication Sig Dispense Refill Last Dose/Taking    ascorbic acid (VITAMIN C ORAL) Take by mouth.   Past Week    ergocalciferol, vitamin D2, (VITAMIN D ORAL) Take by mouth.   Past Week

## 2025-05-22 NOTE — ANESTHESIA POSTPROCEDURE EVALUATION
Anesthesia Post Evaluation    Patient: Jose Young    Procedure(s) Performed: Procedure(s) (LRB):  EGD (ESOPHAGOGASTRODUODENOSCOPY) (N/A)  ESOPHAGEAL BALLOON PROVOCATION STUDY (N/A)  MANOMETRY, ESOPHAGUS, WITH IMPEDANCE MEASUREMENT (N/A)    Final Anesthesia Type: general      Patient location during evaluation: PACU  Patient participation: Yes- Able to Participate  Level of consciousness: awake and alert  Post-procedure vital signs: reviewed and stable  Pain management: adequate  Airway patency: patent    PONV status at discharge: No PONV  Anesthetic complications: no      Cardiovascular status: blood pressure returned to baseline  Respiratory status: unassisted, spontaneous ventilation and room air  Hydration status: euvolemic  Follow-up not needed.              Vitals Value Taken Time   /78 05/21/25 09:47   Temp 36.7 °C (98 °F) 05/21/25 09:45   Pulse 67 05/21/25 09:49   Resp 24 05/21/25 09:49   SpO2 97 % 05/21/25 09:49   Vitals shown include unfiled device data.      No case tracking events are documented in the log.      Pain/Anitha Score: Anitha Score: 10 (5/21/2025  9:45 AM)

## 2025-05-23 ENCOUNTER — TELEPHONE (OUTPATIENT)
Dept: GASTROENTEROLOGY | Facility: CLINIC | Age: 75
End: 2025-05-23
Payer: MEDICARE

## 2025-05-23 PROCEDURE — 91010 ESOPHAGUS MOTILITY STUDY: CPT | Mod: 26,,, | Performed by: INTERNAL MEDICINE

## 2025-05-23 NOTE — TELEPHONE ENCOUNTER
[facility] Note    Esophageal manometry results were discussed with the patient.   They are consistent with his known achalasia.  His IRP remains elevated despite previous POEM.  We discussed pneumatic balloon dilation vs heller myotomy.  He would like to see the AES service to discuss pneumatic balloon dilation.  I will request an appt for him.    Delores Lomeli MD

## 2025-05-23 NOTE — PROVATION PATIENT INSTRUCTIONS
Discharge Summary/Instructions after an Endoscopic Procedure  Patient Name: Jose Young  Patient MRN: [id number]  Patient YOB: 1950  Friday, May 23, 2025  Delores Lomeli MD  Dear patient,  As a result of recent federal legislation (The Federal Cures Act), you may   receive lab or pathology results from your procedure in your [facility]   account before your physician is able to contact you. Your physician or   their representative will relay the results to you with their   recommendations at their soonest availability.  Thank you,  RESTRICTIONS:  During your procedure today, you received medications for sedation.  These   medications may affect your judgment, balance and coordination.  Therefore,   for 24 hours, you have the following restrictions:   - DO NOT drive a car, operate machinery, make legal/financial decisions,   sign important papers or drink alcohol.    ACTIVITY:  Today: no heavy lifting, straining or running due to procedural   sedation/anesthesia.  The following day: return to full activity including work.  DIET:  Eat and drink normally unless instructed otherwise.     TREATMENT FOR COMMON SIDE EFFECTS:  - Mild abdominal pain, nausea, belching, bloating or excessive gas:  rest,   eat lightly and use a heating pad.  - Sore Throat: treat with throat lozenges and/or gargle with warm salt   water.  - Because air was used during the procedure, expelling large amounts of air   from your rectum or belching is normal.  - If a bowel prep was taken, you may not have a bowel movement for 1-3 days.    This is normal.  SYMPTOMS TO WATCH FOR AND REPORT TO YOUR PHYSICIAN:  1. Abdominal pain or bloating, other than gas cramps.  2. Chest pain.  3. Back pain.  4. Signs of infection such as: chills or fever occurring within 24 hours   after the procedure.  5. Rectal bleeding, which would show as bright red, maroon, or black stools.   (A tablespoon of blood from the rectum is not serious, especially if    hemorrhoids are present.)  6. Vomiting.  7. Weakness or dizziness.  GO DIRECTLY TO THE NEAREST EMERGENCY ROOM IF YOU HAVE ANY OF THE FOLLOWING:      Difficulty breathing              Chills and/or fever over 101 F   Persistent vomiting and/or vomiting blood   Severe abdominal pain   Severe chest pain   Black, tarry stools   Bleeding- more than one tablespoon   Any other symptom or condition that you feel may need urgent attention  Your doctor recommends these additional instructions:  If any biopsies were taken, your doctors clinic will contact you in 1 to 2   weeks with any results.  Results to be discussed with the patient.  For questions, problems or results please call your physician - Delores Lomeli MD at Work:  [phone].  [facility] [place], EMERGENCY ROOM PHONE NUMBER: [phone]  IF A COMPLICATION OR EMERGENCY SITUATION ARISES AND YOU ARE UNABLE TO REACH   YOUR PHYSICIAN - GO DIRECTLY TO THE EMERGENCY ROOM.  Delores Lomeli MD  5/23/2025 12:17:44 PM  This report has been verified and signed electronically.  Dear patient,  As a result of recent federal legislation (The Federal Cures Act), you may   receive lab or pathology results from your procedure in your [facility]   account before your physician is able to contact you. Your physician or   their representative will relay the results to you with their   recommendations at their soonest availability.  Thank you,  [employer]ATION

## 2025-07-07 ENCOUNTER — TELEPHONE (OUTPATIENT)
Dept: GASTROENTEROLOGY | Facility: CLINIC | Age: 75
End: 2025-07-07
Payer: MEDICARE

## 2025-07-07 NOTE — TELEPHONE ENCOUNTER
Copied from CRM #[id number]. Topic: General Inquiry - Patient Advice  >> Jul 7, 2025 11:36 AM Luis Manuel wrote:  Consult/Advisory     Name Of Caller: pt         Contact Preference: [phone]       Nature of call: Patient would like to know if his procedure can be done in the [place] area, if so he is ready to schedule. Please call to advise thank you

## 2025-07-08 ENCOUNTER — TELEPHONE (OUTPATIENT)
Dept: GASTROENTEROLOGY | Facility: CLINIC | Age: 75
End: 2025-07-08
Payer: MEDICARE

## 2025-07-08 ENCOUNTER — TELEPHONE (OUTPATIENT)
Dept: ENDOSCOPY | Facility: HOSPITAL | Age: 75
End: 2025-07-08
Payer: MEDICARE

## 2025-07-08 NOTE — TELEPHONE ENCOUNTER
Spoke to pt. Wants to have procedure in [place] where he lives. He stated he would check with Dr. Lomeli. Phone  number to GI clinic provided.

## 2025-07-08 NOTE — TELEPHONE ENCOUNTER
MA called and left a message for the patient to give our office a call back in regards to scheduling procedure. Per Dr. Lomeli she doesn't know of any doctors in the [place] area.

## 2025-07-09 ENCOUNTER — TELEPHONE (OUTPATIENT)
Dept: GASTROENTEROLOGY | Facility: CLINIC | Age: 75
End: 2025-07-09
Payer: MEDICARE

## 2025-07-10 ENCOUNTER — TELEPHONE (OUTPATIENT)
Dept: GASTROENTEROLOGY | Facility: CLINIC | Age: 75
End: 2025-07-10
Payer: MEDICARE

## 2025-07-10 NOTE — TELEPHONE ENCOUNTER
Copied from CRM #[id number]. Topic: General Inquiry - Patient Advice  >> Jul 8, 2025  1:16 PM Janki wrote:  Type:  Needs Medical Advice    Who Called: Jose called in regards to finding if pt can have his surgery in  in [facility] pt ask that someone call him pt ask that someone call him  Would the patient rather a call back or a response via [employer]? Call back   Best Call Back Number: pt [phone]   Additional Information:

## 2025-07-10 NOTE — TELEPHONE ENCOUNTER
Copied from CRM #[id number]. Topic: General Inquiry - Patient Advice  >> Jul 8, 2025  1:16 PM Janki wrote:  Type:  Needs Medical Advice    Who Called: Jose called in regards to finding if pt can have his surgery in  in [facility] pt ask that someone call him pt ask that someone call him  Would the patient rather a call back or a response via [employer]? Call back   Best Call Back Number: pt [phone]   Additional Information:

## 2025-07-10 NOTE — TELEPHONE ENCOUNTER
Copied from CRM #[id number]. Topic: General Inquiry - Patient Advice  >> Jul 8, 2025  1:16 PM Janki wrote:  Type:  Needs Medical Advice    Who Called: Jose called in regards to finding if pt can have his surgery in  in [facility] pt ask that someone call him pt ask that someone call him  Would the patient rather a call back or a response via [employer]? Call back   Best Call Back Number: pt [phone]   Additional Information:

## 2025-07-30 ENCOUNTER — TELEPHONE (OUTPATIENT)
Dept: GASTROENTEROLOGY | Facility: CLINIC | Age: 75
End: 2025-07-30
Payer: MEDICARE

## 2025-07-30 DIAGNOSIS — K22.0 ACHALASIA, ESOPHAGEAL: Primary | ICD-10-CM

## 2025-07-31 ENCOUNTER — TELEPHONE (OUTPATIENT)
Dept: GASTROENTEROLOGY | Facility: CLINIC | Age: 75
End: 2025-07-31
Payer: MEDICARE

## 2025-08-26 ENCOUNTER — ANESTHESIA EVENT (OUTPATIENT)
Dept: ENDOSCOPY | Facility: HOSPITAL | Age: 75
End: 2025-08-26
Payer: MEDICARE

## 2025-08-26 ENCOUNTER — HOSPITAL ENCOUNTER (OUTPATIENT)
Facility: HOSPITAL | Age: 75
Discharge: HOME | End: 2025-08-26
Attending: INTERNAL MEDICINE | Admitting: INTERNAL MEDICINE
Payer: MEDICARE

## 2025-08-26 ENCOUNTER — ANESTHESIA (OUTPATIENT)
Dept: ENDOSCOPY | Facility: HOSPITAL | Age: 75
End: 2025-08-26
Payer: MEDICARE

## 2025-08-26 PROCEDURE — 25000003 PHARM REV CODE 250: Performed by: NURSE ANESTHETIST, CERTIFIED REGISTERED

## 2025-08-26 PROCEDURE — 63600175 PHARM REV CODE 636 W HCPCS: Performed by: NURSE ANESTHETIST, CERTIFIED REGISTERED

## 2025-08-26 RX ORDER — PROPOFOL 10 MG/ML
VIAL (ML) INTRAVENOUS CONTINUOUS PRN
Status: DISCONTINUED | OUTPATIENT
Start: 2025-08-26 | End: 2025-08-26

## 2025-08-26 RX ORDER — LIDOCAINE HYDROCHLORIDE 20 MG/ML
INJECTION INTRAVENOUS
Status: DISCONTINUED | OUTPATIENT
Start: 2025-08-26 | End: 2025-08-26

## 2025-08-26 RX ORDER — PROPOFOL 10 MG/ML
VIAL (ML) INTRAVENOUS
Status: DISCONTINUED | OUTPATIENT
Start: 2025-08-26 | End: 2025-08-26

## 2025-08-26 RX ORDER — FENTANYL CITRATE 50 UG/ML
INJECTION, SOLUTION INTRAMUSCULAR; INTRAVENOUS
Status: DISCONTINUED | OUTPATIENT
Start: 2025-08-26 | End: 2025-08-26

## 2025-08-26 RX ORDER — SUCCINYLCHOLINE CHLORIDE 20 MG/ML
INJECTION INTRAMUSCULAR; INTRAVENOUS
Status: DISCONTINUED | OUTPATIENT
Start: 2025-08-26 | End: 2025-08-26

## 2025-08-26 RX ORDER — ONDANSETRON HYDROCHLORIDE 2 MG/ML
INJECTION, SOLUTION INTRAVENOUS
Status: DISCONTINUED | OUTPATIENT
Start: 2025-08-26 | End: 2025-08-26

## 2025-08-26 RX ORDER — ROCURONIUM BROMIDE 10 MG/ML
INJECTION, SOLUTION INTRAVENOUS
Status: DISCONTINUED | OUTPATIENT
Start: 2025-08-26 | End: 2025-08-26

## 2025-08-26 RX ADMIN — GLYCOPYRROLATE 0.2 MG: 0.2 INJECTION, SOLUTION INTRAMUSCULAR; INTRAVENOUS at 10:08

## 2025-08-26 RX ADMIN — PROPOFOL 100 MG: 10 INJECTION, EMULSION INTRAVENOUS at 11:08

## 2025-08-26 RX ADMIN — SODIUM CHLORIDE: 9 INJECTION, SOLUTION INTRAVENOUS at 10:08

## 2025-08-26 RX ADMIN — PROPOFOL 50 MG: 10 INJECTION, EMULSION INTRAVENOUS at 11:08

## 2025-08-26 RX ADMIN — FENTANYL CITRATE 50 MCG: 50 INJECTION, SOLUTION INTRAMUSCULAR; INTRAVENOUS at 11:08

## 2025-08-26 RX ADMIN — PROPOFOL 150 MCG/KG/MIN: 10 INJECTION, EMULSION INTRAVENOUS at 10:08

## 2025-08-26 RX ADMIN — ROCURONIUM BROMIDE 30 MG: 10 INJECTION INTRAVENOUS at 11:08

## 2025-08-26 RX ADMIN — SUCCINYLCHOLINE CHLORIDE 140 MG: 20 INJECTION, SOLUTION INTRAMUSCULAR; INTRAVENOUS; PARENTERAL at 11:08

## 2025-08-26 RX ADMIN — LIDOCAINE HYDROCHLORIDE 80 MG: 20 INJECTION INTRAVENOUS at 10:08

## 2025-08-26 RX ADMIN — SUGAMMADEX 200 MG: 100 INJECTION, SOLUTION INTRAVENOUS at 12:08

## 2025-08-26 RX ADMIN — PROPOFOL 70 MG: 10 INJECTION, EMULSION INTRAVENOUS at 10:08

## 2025-08-26 RX ADMIN — ONDANSETRON 4 MG: 2 INJECTION INTRAMUSCULAR; INTRAVENOUS at 11:08

## (undated) DEVICE — SYR ONLY LUER LOCK 20CC

## (undated) DEVICE — DRAPE HALF SURGICAL 40X58IN

## (undated) DEVICE — TOWEL OR DISP STRL BLUE 2/PK

## (undated) DEVICE — APPLICATOR CHLORAPREP ORN 26ML

## (undated) DEVICE — NDL INSUF ULTRA VERESS 120MM

## (undated) DEVICE — BLADE SURG STAINLESS STEEL #11

## (undated) DEVICE — DEVICE RESOLUTION 360 CLIP

## (undated) DEVICE — DEVICE GRASPING RAPTOR